# Patient Record
Sex: FEMALE | Race: WHITE | Employment: FULL TIME | URBAN - METROPOLITAN AREA
[De-identification: names, ages, dates, MRNs, and addresses within clinical notes are randomized per-mention and may not be internally consistent; named-entity substitution may affect disease eponyms.]

---

## 2019-05-24 ENCOUNTER — OFFICE VISIT (OUTPATIENT)
Dept: GYNECOLOGY | Age: 59
End: 2019-05-24
Payer: COMMERCIAL

## 2019-05-24 VITALS
HEART RATE: 97 BPM | HEIGHT: 68 IN | DIASTOLIC BLOOD PRESSURE: 88 MMHG | WEIGHT: 260.2 LBS | TEMPERATURE: 98.4 F | SYSTOLIC BLOOD PRESSURE: 158 MMHG | BODY MASS INDEX: 39.43 KG/M2

## 2019-05-24 DIAGNOSIS — Z01.419 WELL WOMAN EXAM WITH ROUTINE GYNECOLOGICAL EXAM: Primary | ICD-10-CM

## 2019-05-24 DIAGNOSIS — N93.9 ABNORMAL UTERINE BLEEDING: ICD-10-CM

## 2019-05-24 PROCEDURE — 99204 OFFICE O/P NEW MOD 45 MIN: CPT | Performed by: OBSTETRICS & GYNECOLOGY

## 2019-05-24 RX ORDER — ASPIRIN 81 MG/1
81 TABLET ORAL DAILY
COMMUNITY

## 2019-05-24 RX ORDER — EZETIMIBE 10 MG/1
10 TABLET ORAL DAILY
COMMUNITY

## 2019-05-24 RX ORDER — LOSARTAN POTASSIUM 50 MG/1
TABLET ORAL
COMMUNITY
End: 2019-07-29 | Stop reason: ALTCHOICE

## 2019-05-24 RX ORDER — ATORVASTATIN CALCIUM 10 MG/1
10 TABLET, FILM COATED ORAL NIGHTLY
COMMUNITY

## 2019-05-24 RX ORDER — LOSARTAN POTASSIUM 25 MG/1
75 TABLET ORAL DAILY
COMMUNITY

## 2019-05-24 RX ORDER — CEFDINIR 300 MG/1
CAPSULE ORAL
COMMUNITY
End: 2019-06-18 | Stop reason: ALTCHOICE

## 2019-05-24 NOTE — PROGRESS NOTES
new patient vaginal bleeding for a week. last pap 10 years ago. mammogram never had one does not want one. never had colonoscopy.

## 2019-05-24 NOTE — PROGRESS NOTES
Subjective:      Patient ID: Dorothea Crum is a 62 y.o. female. HPI  pts here for annual gyn exam.  She is having post men bleeding x 1 week. Reports its heavy. No recent pap or mammogram or colonoscopy. Sad because she feels she is now having to deal with this all alone since her  passed away. Review of Systems Pertinent review of systems items discussed above. All others systems items not discussed above were negative. Objective:   Physical Exam   Constitutional: She is oriented to person, place, and time. She appears well-developed and well-nourished. HENT:   Head: Normocephalic and atraumatic. Neck: No tracheal deviation present. No thyromegaly present. Cardiovascular: Normal rate, regular rhythm and normal heart sounds. No murmur heard. Pulmonary/Chest: Effort normal and breath sounds normal. No respiratory distress. She has no wheezes. She has no rales. Right breast exhibits no mass, no nipple discharge and no skin change. Left breast exhibits no mass, no nipple discharge and no skin change. No breast tenderness (no masses), discharge or bleeding. Abdominal: Soft. She exhibits no distension and no mass. There is no tenderness. There is no rebound. Genitourinary: Vagina normal and uterus normal. Rectal exam shows no external hemorrhoid. No breast tenderness (no masses), discharge or bleeding. There is no lesion on the right labia. There is no lesion on the left labia. Uterus is not deviated, not enlarged, not fixed and not tender. Cervix exhibits no motion tenderness, no discharge and no friability. Right adnexum displays no mass and no tenderness. Left adnexum displays no mass and no tenderness. No foreign body in the vagina. No vaginal discharge found. Genitourinary Comments: Pap not performed nor rectal bc of the heavy bleeding. Musculoskeletal: Normal range of motion. Lymphadenopathy:     She has no cervical adenopathy.    Neurological: She is alert and oriented to person, place, and time. Retained tampon removed    Procedure note  Pre op:  Post men bleeding  Post op:  Same  Procedure: endometrial biopsy  Details:  Using sterile technique, the anterior aspect of the cervix was grasped with a single tooth tenaculum. The uterus was sounded to 10 cm. The pipelle was inserted and the plunger pulled back. Adequate tissue retrieved. The process was repeated an additional time. The patient tolerated the procedure well. I will call her when the results become available to discuss the follow up plan. Assessment:      Normal gyn exam, postmen bleeding      Plan:      F/u here next week to review emb results.         Shelton Bird MD

## 2019-05-31 ENCOUNTER — OFFICE VISIT (OUTPATIENT)
Dept: GYNECOLOGY | Age: 59
End: 2019-05-31
Payer: COMMERCIAL

## 2019-05-31 VITALS
BODY MASS INDEX: 39.65 KG/M2 | SYSTOLIC BLOOD PRESSURE: 196 MMHG | DIASTOLIC BLOOD PRESSURE: 93 MMHG | HEART RATE: 101 BPM | TEMPERATURE: 97.9 F | WEIGHT: 261.6 LBS | HEIGHT: 68 IN

## 2019-05-31 DIAGNOSIS — N93.9 ABNORMAL UTERINE BLEEDING: Primary | ICD-10-CM

## 2019-05-31 PROCEDURE — 99213 OFFICE O/P EST LOW 20 MIN: CPT | Performed by: OBSTETRICS & GYNECOLOGY

## 2019-06-13 ENCOUNTER — TELEPHONE (OUTPATIENT)
Dept: GYNECOLOGY | Age: 59
End: 2019-06-13

## 2019-06-17 ENCOUNTER — TELEPHONE (OUTPATIENT)
Dept: GYNECOLOGY | Age: 59
End: 2019-06-17

## 2019-06-17 NOTE — TELEPHONE ENCOUNTER
Patient states that she has a sore throat and ear pain is on amoxicillin 500mg currently wants to know if she gets it cleared up if surgery can still happen?

## 2019-06-18 ENCOUNTER — TELEPHONE (OUTPATIENT)
Dept: FAMILY MEDICINE CLINIC | Age: 59
End: 2019-06-18

## 2019-06-18 NOTE — PROGRESS NOTES
C-Difficile admission screening and protocol:     * Admitted with diarrhea?no     *Prior history of C-Diff. In last 3 months?no     *Antibiotic use in the past 6-8 weeks? yes-UTI     *Prior hospitalization or nursing home in the last month?    no      [address] time___0730 per pt_________        Surgery time____0900________    Take the following medications with a sip of water: Follow your MD/Surgeons pre-procedure instructions regarding your medications    Do not eat or drink anything after 12:00 midnight prior to your surgery. This includes water chewing gum, mints and ice chips. You may brush your teeth and gargle the morning of your surgery, but do not swallow the water     Please see your family doctor/pediatrician for a history and physical and/or concerning medications. Bring any test results/reports from your physicians office. If you are under the care of a heart doctor or specialist doctor, please be aware that you may be asked to them for clearance    You may be asked to stop blood thinners such as Coumadin, Plavix, Fragmin, Lovenox, etc., or any anti-inflammatories such as:  Aspirin, Ibuprofen, Advil, Naproxen prior to your surgery. We also ask that you stop any OTC medications such as fish oil, vitamin E, glucosamine, garlic, Multivitamins, COQ 10, etc.    We ask that you do not smoke 24 hours prior to surgery  We ask that you do not  drink any alcoholic beverages 24 hours prior to surgery     You must make arrangements for a responsible adult to take you home after your surgery. For your safety you will not be allowed to leave alone or drive yourself home. Your surgery will be cancelled if you do not have a ride home. Also for your safety, it is strongly suggested that someone stay with you the first 24 hours after your surgery.      A parent or legal guardian must accompany a child scheduled for surgery and plan to stay at the hospital until the child is discharged. Please do not bring other children with you. For your comfort, please wear simple loose fitting clothing to the hospital.  Please do not bring valuables. Do not wear any make-up or nail polish on your fingers or toes      For your safety, please do not wear any jewelry or body piercing's on the day of surgery. All jewelry must be removed. If you have dentures, they will be removed before going to operating room. For your convenience, we will provide you with a container. If you wear contact lenses or glasses, they will be removed, please bring a case for them. If you have a living will and a durable power of  for healthcare, please bring in a copy. As part of our patient safety program to minimize surgical site infections, we ask you to do the following:    · Please notify your surgeon if you develop any illness between         now and the  day of your surgery. · This includes a cough, cold, fever, sore throat, nausea,         or vomiting, and diarrhea, etc.  ·  Please notify your surgeon if you experience dizziness, shortness         of breath or blurred vision between now and the time of your surgery. Do not shave your operative site 96 hours prior to surgery. For face and neck surgery, men may use an electric razor 48 hours   prior to surgery. You may shower the night before surgery or the morning of   your surgery with an antibacterial soap. You will need to bring a photo ID and insurance card    [facility] has an onsite pharmacy, would you like to utilize our pharmacy     If you will be staying overnight and use a C-pap machine, please bring   your C-pap to hospital     Our goal is to provide you with excellent care, therefore, visitors will be limited to two(2) in the room at a time so that we may focus on providing this care for you. Please contact pre-admission testing if you have any further questions. [facility] phone number:  [phone] PAT fax number:  [phone]  Please note these are generalized instructions for all surgical cases, you may be provided with more specific instructions according to your surgery.

## 2019-06-18 NOTE — TELEPHONE ENCOUNTER
Pt called with concerns before her  Surgery. She is taking Asprin and she also have a sore throat.  Please give pt a call [phone]

## 2019-06-18 NOTE — TELEPHONE ENCOUNTER
Spoke with patient I informed as previous note stated that with sore throat she is still ok to proceed with surgery.  She is wanting to know if she should take or stop tohe aspirin she is currently taking (81mg)

## 2019-06-19 ENCOUNTER — ANESTHESIA EVENT (OUTPATIENT)
Dept: OPERATING ROOM | Age: 59
End: 2019-06-19
Payer: COMMERCIAL

## 2019-06-20 ENCOUNTER — HOSPITAL ENCOUNTER (OUTPATIENT)
Age: 59
Setting detail: OUTPATIENT SURGERY
Discharge: HOME | End: 2019-06-20
Attending: OBSTETRICS & GYNECOLOGY | Admitting: OBSTETRICS & GYNECOLOGY
Payer: COMMERCIAL

## 2019-06-20 ENCOUNTER — ANESTHESIA (OUTPATIENT)
Dept: OPERATING ROOM | Age: 59
End: 2019-06-20
Payer: COMMERCIAL

## 2019-06-20 VITALS
SYSTOLIC BLOOD PRESSURE: 159 MMHG | DIASTOLIC BLOOD PRESSURE: 98 MMHG | RESPIRATION RATE: 16 BRPM | WEIGHT: 259.7 LBS | TEMPERATURE: 97.1 F | HEIGHT: 68 IN | HEART RATE: 95 BPM | OXYGEN SATURATION: 96 % | BODY MASS INDEX: 39.36 KG/M2

## 2019-06-20 VITALS
DIASTOLIC BLOOD PRESSURE: 64 MMHG | SYSTOLIC BLOOD PRESSURE: 147 MMHG | OXYGEN SATURATION: 100 % | RESPIRATION RATE: 12 BRPM

## 2019-06-20 DIAGNOSIS — N95.0 POSTMENOPAUSAL BLEEDING: Primary | ICD-10-CM

## 2019-06-20 PROCEDURE — 6360000002 HC RX W HCPCS: Performed by: ANESTHESIOLOGY

## 2019-06-20 PROCEDURE — 2709999900 HC NON-CHARGEABLE SUPPLY: Performed by: OBSTETRICS & GYNECOLOGY

## 2019-06-20 PROCEDURE — 7100000011 HC PHASE II RECOVERY - ADDTL 15 MIN: Performed by: OBSTETRICS & GYNECOLOGY

## 2019-06-20 PROCEDURE — 88305 TISSUE EXAM BY PATHOLOGIST: CPT

## 2019-06-20 PROCEDURE — 3700000000 HC ANESTHESIA ATTENDED CARE: Performed by: OBSTETRICS & GYNECOLOGY

## 2019-06-20 PROCEDURE — 3700000001 HC ADD 15 MINUTES (ANESTHESIA): Performed by: OBSTETRICS & GYNECOLOGY

## 2019-06-20 PROCEDURE — 58558 HYSTEROSCOPY BIOPSY: CPT | Performed by: OBSTETRICS & GYNECOLOGY

## 2019-06-20 PROCEDURE — 2580000003 HC RX 258: Performed by: NURSE ANESTHETIST, CERTIFIED REGISTERED

## 2019-06-20 PROCEDURE — 2500000003 HC RX 250 WO HCPCS: Performed by: NURSE ANESTHETIST, CERTIFIED REGISTERED

## 2019-06-20 PROCEDURE — 3600000014 HC SURGERY LEVEL 4 ADDTL 15MIN: Performed by: OBSTETRICS & GYNECOLOGY

## 2019-06-20 PROCEDURE — 2580000003 HC RX 258: Performed by: OBSTETRICS & GYNECOLOGY

## 2019-06-20 PROCEDURE — 7100000001 HC PACU RECOVERY - ADDTL 15 MIN: Performed by: OBSTETRICS & GYNECOLOGY

## 2019-06-20 PROCEDURE — 2580000003 HC RX 258: Performed by: ANESTHESIOLOGY

## 2019-06-20 PROCEDURE — 7100000000 HC PACU RECOVERY - FIRST 15 MIN: Performed by: OBSTETRICS & GYNECOLOGY

## 2019-06-20 PROCEDURE — 3600000004 HC SURGERY LEVEL 4 BASE: Performed by: OBSTETRICS & GYNECOLOGY

## 2019-06-20 PROCEDURE — 6360000002 HC RX W HCPCS: Performed by: NURSE ANESTHETIST, CERTIFIED REGISTERED

## 2019-06-20 PROCEDURE — 7100000010 HC PHASE II RECOVERY - FIRST 15 MIN: Performed by: OBSTETRICS & GYNECOLOGY

## 2019-06-20 RX ORDER — SODIUM CHLORIDE 9 MG/ML
INJECTION, SOLUTION INTRAVENOUS CONTINUOUS PRN
Status: DISCONTINUED | OUTPATIENT
Start: 2019-06-20 | End: 2019-06-20 | Stop reason: SDUPTHER

## 2019-06-20 RX ORDER — LIDOCAINE HYDROCHLORIDE 10 MG/ML
1 INJECTION, SOLUTION EPIDURAL; INFILTRATION; INTRACAUDAL; PERINEURAL
Status: DISCONTINUED | OUTPATIENT
Start: 2019-06-20 | End: 2019-06-20 | Stop reason: HOSPADM

## 2019-06-20 RX ORDER — OXYCODONE HYDROCHLORIDE AND ACETAMINOPHEN 5; 325 MG/1; MG/1
1 TABLET ORAL EVERY 6 HOURS PRN
Qty: 28 TABLET | Refills: 0 | Status: SHIPPED | OUTPATIENT
Start: 2019-06-20 | End: 2019-06-27

## 2019-06-20 RX ORDER — FENTANYL CITRATE 50 UG/ML
25 INJECTION, SOLUTION INTRAMUSCULAR; INTRAVENOUS EVERY 5 MIN PRN
Status: DISCONTINUED | OUTPATIENT
Start: 2019-06-20 | End: 2019-06-20 | Stop reason: HOSPADM

## 2019-06-20 RX ORDER — ONDANSETRON 2 MG/ML
4 INJECTION INTRAMUSCULAR; INTRAVENOUS
Status: DISCONTINUED | OUTPATIENT
Start: 2019-06-20 | End: 2019-06-20 | Stop reason: HOSPADM

## 2019-06-20 RX ORDER — OXYCODONE HYDROCHLORIDE AND ACETAMINOPHEN 5; 325 MG/1; MG/1
2 TABLET ORAL PRN
Status: DISCONTINUED | OUTPATIENT
Start: 2019-06-20 | End: 2019-06-20 | Stop reason: HOSPADM

## 2019-06-20 RX ORDER — EPHEDRINE SULFATE/0.9% NACL/PF 50 MG/5 ML
SYRINGE (ML) INTRAVENOUS PRN
Status: DISCONTINUED | OUTPATIENT
Start: 2019-06-20 | End: 2019-06-20 | Stop reason: SDUPTHER

## 2019-06-20 RX ORDER — MIDAZOLAM HYDROCHLORIDE 1 MG/ML
INJECTION INTRAMUSCULAR; INTRAVENOUS PRN
Status: DISCONTINUED | OUTPATIENT
Start: 2019-06-20 | End: 2019-06-20 | Stop reason: SDUPTHER

## 2019-06-20 RX ORDER — OXYCODONE HYDROCHLORIDE AND ACETAMINOPHEN 5; 325 MG/1; MG/1
1 TABLET ORAL PRN
Status: DISCONTINUED | OUTPATIENT
Start: 2019-06-20 | End: 2019-06-20 | Stop reason: HOSPADM

## 2019-06-20 RX ORDER — APREPITANT 40 MG/1
40 CAPSULE ORAL ONCE
Status: COMPLETED | OUTPATIENT
Start: 2019-06-20 | End: 2019-06-20

## 2019-06-20 RX ORDER — SODIUM CHLORIDE 0.9 % (FLUSH) 0.9 %
10 SYRINGE (ML) INJECTION EVERY 12 HOURS SCHEDULED
Status: DISCONTINUED | OUTPATIENT
Start: 2019-06-20 | End: 2019-06-20 | Stop reason: HOSPADM

## 2019-06-20 RX ORDER — SODIUM CHLORIDE 0.9 % (FLUSH) 0.9 %
10 SYRINGE (ML) INJECTION PRN
Status: DISCONTINUED | OUTPATIENT
Start: 2019-06-20 | End: 2019-06-20 | Stop reason: HOSPADM

## 2019-06-20 RX ORDER — LIDOCAINE HYDROCHLORIDE 20 MG/ML
INJECTION, SOLUTION EPIDURAL; INFILTRATION; INTRACAUDAL; PERINEURAL PRN
Status: DISCONTINUED | OUTPATIENT
Start: 2019-06-20 | End: 2019-06-20 | Stop reason: SDUPTHER

## 2019-06-20 RX ORDER — FENTANYL CITRATE 50 UG/ML
INJECTION, SOLUTION INTRAMUSCULAR; INTRAVENOUS PRN
Status: DISCONTINUED | OUTPATIENT
Start: 2019-06-20 | End: 2019-06-20 | Stop reason: SDUPTHER

## 2019-06-20 RX ORDER — SODIUM CHLORIDE 9 MG/ML
INJECTION, SOLUTION INTRAVENOUS CONTINUOUS
Status: DISCONTINUED | OUTPATIENT
Start: 2019-06-20 | End: 2019-06-20 | Stop reason: HOSPADM

## 2019-06-20 RX ORDER — DEXAMETHASONE SODIUM PHOSPHATE 4 MG/ML
INJECTION, SOLUTION INTRA-ARTICULAR; INTRALESIONAL; INTRAMUSCULAR; INTRAVENOUS; SOFT TISSUE PRN
Status: DISCONTINUED | OUTPATIENT
Start: 2019-06-20 | End: 2019-06-20 | Stop reason: SDUPTHER

## 2019-06-20 RX ORDER — ONDANSETRON 2 MG/ML
INJECTION INTRAMUSCULAR; INTRAVENOUS PRN
Status: DISCONTINUED | OUTPATIENT
Start: 2019-06-20 | End: 2019-06-20 | Stop reason: SDUPTHER

## 2019-06-20 RX ORDER — MAGNESIUM HYDROXIDE 1200 MG/15ML
LIQUID ORAL CONTINUOUS PRN
Status: COMPLETED | OUTPATIENT
Start: 2019-06-20 | End: 2019-06-20

## 2019-06-20 RX ORDER — PROPOFOL 10 MG/ML
INJECTION, EMULSION INTRAVENOUS PRN
Status: DISCONTINUED | OUTPATIENT
Start: 2019-06-20 | End: 2019-06-20 | Stop reason: SDUPTHER

## 2019-06-20 RX ADMIN — HYDROMORPHONE HYDROCHLORIDE 0.5 MG: 1 INJECTION, SOLUTION INTRAMUSCULAR; INTRAVENOUS; SUBCUTANEOUS at 09:29

## 2019-06-20 RX ADMIN — ONDANSETRON 4 MG: 2 INJECTION INTRAMUSCULAR; INTRAVENOUS at 08:46

## 2019-06-20 RX ADMIN — FENTANYL CITRATE 50 MCG: 50 INJECTION INTRAMUSCULAR; INTRAVENOUS at 09:02

## 2019-06-20 RX ADMIN — SODIUM CHLORIDE: 9 INJECTION, SOLUTION INTRAVENOUS at 08:09

## 2019-06-20 RX ADMIN — MIDAZOLAM 2 MG: 1 INJECTION INTRAMUSCULAR; INTRAVENOUS at 08:28

## 2019-06-20 RX ADMIN — APREPITANT 40 MG: 40 CAPSULE ORAL at 08:12

## 2019-06-20 RX ADMIN — DEXAMETHASONE SODIUM PHOSPHATE 8 MG: 4 INJECTION, SOLUTION INTRAMUSCULAR; INTRAVENOUS at 08:29

## 2019-06-20 RX ADMIN — FENTANYL CITRATE 50 MCG: 50 INJECTION INTRAMUSCULAR; INTRAVENOUS at 08:29

## 2019-06-20 RX ADMIN — PROPOFOL 200 MG: 10 INJECTION, EMULSION INTRAVENOUS at 08:31

## 2019-06-20 RX ADMIN — SODIUM CHLORIDE: 9 INJECTION, SOLUTION INTRAVENOUS at 08:20

## 2019-06-20 RX ADMIN — Medication 10 MG: at 08:56

## 2019-06-20 RX ADMIN — FAMOTIDINE 20 MG: 10 INJECTION, SOLUTION INTRAVENOUS at 08:29

## 2019-06-20 RX ADMIN — LIDOCAINE HYDROCHLORIDE 100 MG: 20 INJECTION, SOLUTION EPIDURAL; INFILTRATION; INTRACAUDAL; PERINEURAL at 08:31

## 2019-06-20 ASSESSMENT — PULMONARY FUNCTION TESTS
PIF_VALUE: 0
PIF_VALUE: 1
PIF_VALUE: 6
PIF_VALUE: 3
PIF_VALUE: 21
PIF_VALUE: 4
PIF_VALUE: 3
PIF_VALUE: 6
PIF_VALUE: 0
PIF_VALUE: 4
PIF_VALUE: 18
PIF_VALUE: 0
PIF_VALUE: 1
PIF_VALUE: 4
PIF_VALUE: 6
PIF_VALUE: 3
PIF_VALUE: 33
PIF_VALUE: 6
PIF_VALUE: 1
PIF_VALUE: 3
PIF_VALUE: 26
PIF_VALUE: 3
PIF_VALUE: 4
PIF_VALUE: 5
PIF_VALUE: 21
PIF_VALUE: 0
PIF_VALUE: 6
PIF_VALUE: 5
PIF_VALUE: 33
PIF_VALUE: 21
PIF_VALUE: 3
PIF_VALUE: 6
PIF_VALUE: 9
PIF_VALUE: 0
PIF_VALUE: 18
PIF_VALUE: 22
PIF_VALUE: 5
PIF_VALUE: 2
PIF_VALUE: 15
PIF_VALUE: 2
PIF_VALUE: 18
PIF_VALUE: 3
PIF_VALUE: 3
PIF_VALUE: 4
PIF_VALUE: 6
PIF_VALUE: 5
PIF_VALUE: 1
PIF_VALUE: 3
PIF_VALUE: 6
PIF_VALUE: 4
PIF_VALUE: 0
PIF_VALUE: 1
PIF_VALUE: 29

## 2019-06-20 ASSESSMENT — PAIN DESCRIPTION - LOCATION
LOCATION: ABDOMEN

## 2019-06-20 ASSESSMENT — PAIN DESCRIPTION - PROGRESSION
CLINICAL_PROGRESSION: NOT CHANGED
CLINICAL_PROGRESSION: GRADUALLY IMPROVING
CLINICAL_PROGRESSION: NOT CHANGED

## 2019-06-20 ASSESSMENT — PAIN - FUNCTIONAL ASSESSMENT: PAIN_FUNCTIONAL_ASSESSMENT: 0-10

## 2019-06-20 ASSESSMENT — PAIN SCALES - GENERAL
PAINLEVEL_OUTOF10: 0
PAINLEVEL_OUTOF10: 2
PAINLEVEL_OUTOF10: 7
PAINLEVEL_OUTOF10: 0

## 2019-06-20 ASSESSMENT — PAIN DESCRIPTION - ONSET
ONSET: ON-GOING
ONSET: UNABLE TO TELL
ONSET: ON-GOING

## 2019-06-20 ASSESSMENT — PAIN DESCRIPTION - PAIN TYPE
TYPE: SURGICAL PAIN

## 2019-06-20 ASSESSMENT — PAIN DESCRIPTION - FREQUENCY
FREQUENCY: CONTINUOUS

## 2019-06-20 ASSESSMENT — PAIN DESCRIPTION - ORIENTATION
ORIENTATION: LOWER

## 2019-06-20 ASSESSMENT — PAIN DESCRIPTION - DESCRIPTORS
DESCRIPTORS: CRAMPING

## 2019-06-20 NOTE — BRIEF OP NOTE
Brief Postoperative Note  ______________________________________________________________    Patient: Johanna Arroyo  YOB: 1960  MRN: [id number]  Date of Procedure: 6/20/2019    Pre-Op Diagnosis: POST MENAPAUSAL BLEEDING    Post-Op Diagnosis: Same       Procedure(s):   HYSTEROSCOPY DILATION AND CURETTAGE    Anesthesia: General    Surgeon(s):  Brittaney Flores MD    Assistant:     Estimated Blood Loss (mL): less than 50     Complications: None    Specimens:   ID Type Source Tests Collected by Time Destination   A : endometrial curettings Tissue Tissue SURGICAL PATHOLOGY Brittaney Flores MD 7/34/8915 3923        Implants:  * No implants in log *      Drains:   [REMOVED] Urethral Catheter Straight-tip;Latex 16 fr (Removed)       Findings: lush endometrial lining    sAha Ruiz MD  Date: 9/23/3482  Time: 9:16 AM

## 2019-06-20 NOTE — PROGRESS NOTES
Pt uses IS with good effort. Mod amt bloody drainage noted to jaclyn pad - pad changed. Pt theresa well. Abd soft.   To phase 2 care per stretcher with RN assist.

## 2019-06-20 NOTE — PROGRESS NOTES
Pt to pacu from OR. Pt awake. Denies pain at present. Abd rounded and soft. Soniya pad in place - small amt sero sang drainage noted. IV infusing. Monitor in sinus rhythm.

## 2019-06-20 NOTE — PROGRESS NOTES
.. Vaginal Sweep Documentation     Intraop skin prep sponge count correct, verified by cs and LD. Vaginal sweep performed by DR QUINTERO at 3599. No foreign objects or vaginal tears noted.

## 2019-06-20 NOTE — PROGRESS NOTES
Pt sitting on edge of bed, tolerating po. Discharge instructions given to patient and family. IV d/c'd.

## 2019-06-20 NOTE — ANESTHESIA PRE PROCEDURE
Department of Anesthesiology  Preprocedure Note       Name:  Valentino Conroy   Age:  62 y.o.  :  1960                                          MRN:  [id number]         Date:  2019      Surgeon: Rachel Casillas):  Darshan Gusman MD    Procedure: HYSTEROSCOPY DILATION AND CURETTAGE (N/A )    Medications prior to admission:   Prior to Admission medications    Medication Sig Start Date End Date Taking? Authorizing Provider   losartan (COZAAR) 50 MG tablet losartan 50 mg tablet daily   Yes Historical Provider, MD   losartan (COZAAR) 25 MG tablet losartan 25 mg tablet daily   Yes Historical Provider, MD   aspirin 81 MG tablet aspirin 81 mg tablet,delayed release   Yes Historical Provider, MD   ezetimibe (ZETIA) 10 MG tablet ezetimibe 10 mg tablet daily   Yes Historical Provider, MD   atorvastatin (LIPITOR) 10 MG tablet atorvastatin 10 mg tablet every evening   Yes Historical Provider, MD       Current medications:    No current facility-administered medications for this encounter. Current Outpatient Medications   Medication Sig Dispense Refill    losartan (COZAAR) 50 MG tablet losartan 50 mg tablet daily      losartan (COZAAR) 25 MG tablet losartan 25 mg tablet daily      aspirin 81 MG tablet aspirin 81 mg tablet,delayed release      ezetimibe (ZETIA) 10 MG tablet ezetimibe 10 mg tablet daily      atorvastatin (LIPITOR) 10 MG tablet atorvastatin 10 mg tablet every evening         Allergies:  No Known Allergies    Problem List:  There is no problem list on file for this patient.       Past Medical History:        Diagnosis Date    PONV (postoperative nausea and vomiting)        Past Surgical History:        Procedure Laterality Date     SECTION      CHOLECYSTECTOMY      DILATION AND CURETTAGE OF UTERUS         Social History:    Social History     Tobacco Use    Smoking status: Never Smoker    Smokeless tobacco: Never Used   Substance Use Topics    Alcohol use: Yes     Comment: socially Counseling given: Not Answered      Vital Signs (Current):   Vitals:    06/18/19 1515   Weight: 261 lb (118.4 kg)   Height: 5' 8\" (1.727 m)                                              BP Readings from Last 3 Encounters:   05/31/19 (!) 196/93   05/24/19 (!) 158/88       NPO Status:                                                                                 BMI:   Wt Readings from Last 3 Encounters:   05/31/19 261 lb 9.6 oz (118.7 kg)   05/24/19 260 lb 3.2 oz (118 kg)     Body mass index is 39.68 kg/m². CBC: No results found for: WBC, RBC, HGB, HCT, MCV, RDW, PLT    CMP: No results found for: NA, K, CL, CO2, BUN, CREATININE, GFRAA, AGRATIO, LABGLOM, GLUCOSE, PROT, CALCIUM, BILITOT, ALKPHOS, AST, ALT    POC Tests: No results for input(s): POCGLU, POCNA, POCK, POCCL, POCBUN, POCHEMO, POCHCT in the last 72 hours. Coags: No results found for: PROTIME, INR, APTT    HCG (If Applicable): No results found for: PREGTESTUR, PREGSERUM, HCG, HCGQUANT     ABGs: No results found for: PHART, PO2ART, NCC3ARQ, CYO4QJX, BEART, K0IIFBAE     Type & Screen (If Applicable):  No results found for: LABABO, LABRH    Anesthesia Evaluation     history of anesthetic complications: PONV. Airway: Mallampati: II  TM distance: >3 FB   Neck ROM: full  Mouth opening: > = 3 FB Dental:    (+) partials      Pulmonary:       (-) pneumonia, COPD and asthma                           Cardiovascular:    (+) hypertension:, hyperlipidemia    (-) valvular problems/murmurs, CAD, CABG/stent and dysrhythmias      Rhythm: regular                      Neuro/Psych:      (-) seizures, TIA and CVA           GI/Hepatic/Renal:        (-) GERD, PUD, hepatitis, liver disease and no renal disease       Endo/Other:        (-) diabetes mellitus, hypothyroidism, hyperthyroidism               Abdominal:           Vascular:     - PVD and DVT.                                   Anesthesia Plan      general     ASA 2       Induction:

## 2019-06-20 NOTE — PROGRESS NOTES
Pt arrived to phase 2 from PACU. Pt awake and alert. No vaginal bleeding noted. Rates abd pain as 2/10.   VSS

## 2019-06-20 NOTE — ANESTHESIA POSTPROCEDURE EVALUATION
Department of Anesthesiology  Postprocedure Note    Patient: Dorothea Crum  MRN: [id number]  YOB: 1960  Date of evaluation: 6/20/2019  Time:  9:28 AM     Procedure Summary     Date:  06/20/19 Room / Location:  [facility] OR 02 / [facility] OR    Anesthesia Start:  0830 Anesthesia Stop:  5937    Procedure:  HYSTEROSCOPY DILATION AND CURETTAGE (N/A Uterus) Diagnosis:  (POST MENAPAUSAL BLEEDING)    Surgeon:  Mercedes Aguilar MD Responsible Provider:  Cher Ochoa MD    Anesthesia Type:  general ASA Status:  2          Anesthesia Type: general    Jose Phase I: Jose Score: 8    Jose Phase II:      Last vitals: Reviewed and per EMR flowsheets.        Anesthesia Post Evaluation    Patient location during evaluation: PACU  Patient participation: complete - patient participated  Level of consciousness: awake  Pain score: 2  Airway patency: patent  Nausea & Vomiting: no nausea and no vomiting  Complications: no  Cardiovascular status: blood pressure returned to baseline  Respiratory status: acceptable  Hydration status: euvolemic

## 2019-06-21 NOTE — OP NOTE
first with  the endometrial polyp forceps and then with a curette. All the  endometrial curettings were collected and sent off to be evaluated by  pathologist.  The hysteroscope was then reinserted and there was a much  less lush endometrial lining seen. All the instruments then removed  from the patient's pelvic organs. She was taken out of lithotomy and allowed to awaken from anesthesia. After which time, she was transferred from the operating room to the  recovery room where she went in stable condition. All sponge, lap, and  needles were correct x2.         Nitza Washington MD    D: 15/06/9727 9:15:25       T: 06/20/2019 9:24:33     [email or web address]  Job#: [id number]     Doc#: [id number]    CC:

## 2019-06-24 ENCOUNTER — TELEPHONE (OUTPATIENT)
Dept: GYNECOLOGY | Age: 59
End: 2019-06-24

## 2019-06-28 ENCOUNTER — OFFICE VISIT (OUTPATIENT)
Dept: GYNECOLOGY | Age: 59
End: 2019-06-28
Payer: COMMERCIAL

## 2019-06-28 ENCOUNTER — TELEPHONE (OUTPATIENT)
Dept: GYNECOLOGY | Age: 59
End: 2019-06-28

## 2019-06-28 VITALS
DIASTOLIC BLOOD PRESSURE: 90 MMHG | BODY MASS INDEX: 41.14 KG/M2 | HEIGHT: 66 IN | SYSTOLIC BLOOD PRESSURE: 148 MMHG | WEIGHT: 256 LBS

## 2019-06-28 DIAGNOSIS — N85.02 COMPLEX ENDOMETRIAL HYPERPLASIA WITH ATYPIA: ICD-10-CM

## 2019-06-28 DIAGNOSIS — N93.9 ABNORMAL UTERINE BLEEDING: Primary | ICD-10-CM

## 2019-06-28 PROCEDURE — 99213 OFFICE O/P EST LOW 20 MIN: CPT | Performed by: OBSTETRICS & GYNECOLOGY

## 2019-06-28 NOTE — TELEPHONE ENCOUNTER
I spoke with the patient she had some additional questions about pre op discussed at todays appointment and concerns. I answered all additional questions she had and eased her fears. She stated that she enjoyed talking with me and dr verona santiago and that we all make her fell very comfortable. DONE.

## 2019-07-19 ENCOUNTER — HOSPITAL ENCOUNTER (OUTPATIENT)
Dept: PREADMISSION TESTING | Age: 59
Discharge: HOME | End: 2019-07-23
Payer: COMMERCIAL

## 2019-07-19 ENCOUNTER — HOSPITAL ENCOUNTER (OUTPATIENT)
Dept: GENERAL RADIOLOGY | Age: 59
Discharge: HOME | End: 2019-07-19
Payer: COMMERCIAL

## 2019-07-19 DIAGNOSIS — Z01.818 ENCOUNTER FOR PREADMISSION TESTING: ICD-10-CM

## 2019-07-19 LAB
ABO/RH: NORMAL
ANION GAP SERPL CALCULATED.3IONS-SCNC: 12 MMOL/L (ref 3–16)
ANTIBODY SCREEN: NORMAL
BASOPHILS ABSOLUTE: 0.1 K/UL (ref 0–0.2)
BASOPHILS RELATIVE PERCENT: 0.9 %
BILIRUBIN URINE: NEGATIVE
BLOOD, URINE: NEGATIVE
BUN BLDV-MCNC: 14 MG/DL (ref 7–20)
CALCIUM SERPL-MCNC: 9.6 MG/DL (ref 8.3–10.6)
CHLORIDE BLD-SCNC: 103 MMOL/L (ref 99–110)
CLARITY: CLEAR
CO2: 27 MMOL/L (ref 21–32)
COLOR: YELLOW
CREAT SERPL-MCNC: 0.8 MG/DL (ref 0.6–1.2)
EKG ATRIAL RATE: 86 BPM
EKG DIAGNOSIS: NORMAL
EKG P AXIS: 57 DEGREES
EKG P-R INTERVAL: 154 MS
EKG Q-T INTERVAL: 372 MS
EKG QRS DURATION: 80 MS
EKG QTC CALCULATION (BAZETT): 445 MS
EKG R AXIS: 38 DEGREES
EKG T AXIS: 50 DEGREES
EKG VENTRICULAR RATE: 86 BPM
EOSINOPHILS ABSOLUTE: 0.1 K/UL (ref 0–0.6)
EOSINOPHILS RELATIVE PERCENT: 1.9 %
GFR AFRICAN AMERICAN: >60
GFR NON-AFRICAN AMERICAN: >60
GLUCOSE BLD-MCNC: 109 MG/DL (ref 70–99)
GLUCOSE URINE: NEGATIVE MG/DL
HCT VFR BLD CALC: 40.3 % (ref 36–48)
HEMOGLOBIN: 13.2 G/DL (ref 12–16)
KETONES, URINE: NEGATIVE MG/DL
LEUKOCYTE ESTERASE, URINE: NEGATIVE
LYMPHOCYTES ABSOLUTE: 2.5 K/UL (ref 1–5.1)
LYMPHOCYTES RELATIVE PERCENT: 35.9 %
MCH RBC QN AUTO: 30 PG (ref 26–34)
MCHC RBC AUTO-ENTMCNC: 32.8 G/DL (ref 31–36)
MCV RBC AUTO: 91.6 FL (ref 80–100)
MICROSCOPIC EXAMINATION: NORMAL
MONOCYTES ABSOLUTE: 0.7 K/UL (ref 0–1.3)
MONOCYTES RELATIVE PERCENT: 10.2 %
NEUTROPHILS ABSOLUTE: 3.5 K/UL (ref 1.7–7.7)
NEUTROPHILS RELATIVE PERCENT: 51.1 %
NITRITE, URINE: NEGATIVE
PDW BLD-RTO: 12.9 % (ref 12.4–15.4)
PH UA: 7 (ref 5–8)
PLATELET # BLD: 302 K/UL (ref 135–450)
PMV BLD AUTO: 8 FL (ref 5–10.5)
POTASSIUM SERPL-SCNC: 4.3 MMOL/L (ref 3.5–5.1)
PROTEIN UA: NEGATIVE MG/DL
RBC # BLD: 4.4 M/UL (ref 4–5.2)
SODIUM BLD-SCNC: 142 MMOL/L (ref 136–145)
SPECIFIC GRAVITY UA: 1.03 (ref 1–1.03)
URINE REFLEX TO CULTURE: NORMAL
URINE TYPE: NORMAL
UROBILINOGEN, URINE: 1 E.U./DL
WBC # BLD: 6.9 K/UL (ref 4–11)

## 2019-07-19 PROCEDURE — 85025 COMPLETE CBC W/AUTO DIFF WBC: CPT

## 2019-07-19 PROCEDURE — 81003 URINALYSIS AUTO W/O SCOPE: CPT

## 2019-07-19 PROCEDURE — 71046 X-RAY EXAM CHEST 2 VIEWS: CPT

## 2019-07-19 PROCEDURE — 93010 ELECTROCARDIOGRAM REPORT: CPT | Performed by: INTERNAL MEDICINE

## 2019-07-19 PROCEDURE — 86900 BLOOD TYPING SEROLOGIC ABO: CPT

## 2019-07-19 PROCEDURE — 80048 BASIC METABOLIC PNL TOTAL CA: CPT

## 2019-07-19 PROCEDURE — 86850 RBC ANTIBODY SCREEN: CPT

## 2019-07-19 PROCEDURE — 86901 BLOOD TYPING SEROLOGIC RH(D): CPT

## 2019-07-19 PROCEDURE — 93005 ELECTROCARDIOGRAM TRACING: CPT | Performed by: OBSTETRICS & GYNECOLOGY

## 2019-07-25 ENCOUNTER — HOSPITAL ENCOUNTER (OUTPATIENT)
Dept: ULTRASOUND IMAGING | Age: 59
Discharge: HOME | End: 2019-07-25
Payer: COMMERCIAL

## 2019-07-25 DIAGNOSIS — N95.0 POSTMENOPAUSAL BLEEDING: ICD-10-CM

## 2019-07-25 PROCEDURE — 76856 US EXAM PELVIC COMPLETE: CPT

## 2019-07-25 PROCEDURE — 76830 TRANSVAGINAL US NON-OB: CPT

## 2019-07-29 RX ORDER — LORAZEPAM 0.5 MG/1
0.5 TABLET ORAL 2 TIMES DAILY PRN
COMMUNITY
Start: 2019-07-09

## 2019-07-30 ENCOUNTER — ANESTHESIA EVENT (OUTPATIENT)
Dept: OPERATING ROOM | Age: 59
End: 2019-07-30
Payer: COMMERCIAL

## 2019-08-01 ENCOUNTER — ANESTHESIA (OUTPATIENT)
Dept: OPERATING ROOM | Age: 59
End: 2019-08-01
Payer: COMMERCIAL

## 2019-08-01 ENCOUNTER — HOSPITAL ENCOUNTER (OUTPATIENT)
Age: 59
Discharge: NURSING, REHAB OR LONG-TERM CARE | End: 2019-08-02
Attending: OBSTETRICS & GYNECOLOGY | Admitting: OBSTETRICS & GYNECOLOGY
Payer: COMMERCIAL

## 2019-08-01 VITALS
OXYGEN SATURATION: 94 % | SYSTOLIC BLOOD PRESSURE: 133 MMHG | TEMPERATURE: 97.2 F | RESPIRATION RATE: 11 BRPM | DIASTOLIC BLOOD PRESSURE: 77 MMHG

## 2019-08-01 DIAGNOSIS — Z01.818 ENCOUNTER FOR PREADMISSION TESTING: Primary | ICD-10-CM

## 2019-08-01 DIAGNOSIS — N85.00 ENDOMETRIAL HYPERPLASIA: ICD-10-CM

## 2019-08-01 PROBLEM — C54.1 ENDOMETRIAL CANCER (HCC): Status: ACTIVE | Noted: 2019-08-01

## 2019-08-01 LAB
ABO/RH: NORMAL
ANTIBODY SCREEN: NORMAL

## 2019-08-01 PROCEDURE — 86901 BLOOD TYPING SEROLOGIC RH(D): CPT

## 2019-08-01 PROCEDURE — S2900 ROBOTIC SURGICAL SYSTEM: HCPCS | Performed by: OBSTETRICS & GYNECOLOGY

## 2019-08-01 PROCEDURE — 88305 TISSUE EXAM BY PATHOLOGIST: CPT

## 2019-08-01 PROCEDURE — 2709999900 HC NON-CHARGEABLE SUPPLY: Performed by: OBSTETRICS & GYNECOLOGY

## 2019-08-01 PROCEDURE — 2580000003 HC RX 258: Performed by: ANESTHESIOLOGY

## 2019-08-01 PROCEDURE — 88307 TISSUE EXAM BY PATHOLOGIST: CPT

## 2019-08-01 PROCEDURE — 7100000001 HC PACU RECOVERY - ADDTL 15 MIN: Performed by: OBSTETRICS & GYNECOLOGY

## 2019-08-01 PROCEDURE — 86900 BLOOD TYPING SEROLOGIC ABO: CPT

## 2019-08-01 PROCEDURE — 2700000000 HC OXYGEN THERAPY PER DAY

## 2019-08-01 PROCEDURE — 7100000000 HC PACU RECOVERY - FIRST 15 MIN: Performed by: OBSTETRICS & GYNECOLOGY

## 2019-08-01 PROCEDURE — 3600000009 HC SURGERY ROBOT BASE: Performed by: OBSTETRICS & GYNECOLOGY

## 2019-08-01 PROCEDURE — 88360 TUMOR IMMUNOHISTOCHEM/MANUAL: CPT

## 2019-08-01 PROCEDURE — 2580000003 HC RX 258: Performed by: OBSTETRICS & GYNECOLOGY

## 2019-08-01 PROCEDURE — 6360000002 HC RX W HCPCS

## 2019-08-01 PROCEDURE — 2500000003 HC RX 250 WO HCPCS: Performed by: OBSTETRICS & GYNECOLOGY

## 2019-08-01 PROCEDURE — 3600000019 HC SURGERY ROBOT ADDTL 15MIN: Performed by: OBSTETRICS & GYNECOLOGY

## 2019-08-01 PROCEDURE — 2500000003 HC RX 250 WO HCPCS

## 2019-08-01 PROCEDURE — 6370000000 HC RX 637 (ALT 250 FOR IP): Performed by: OBSTETRICS & GYNECOLOGY

## 2019-08-01 PROCEDURE — 2720000010 HC SURG SUPPLY STERILE: Performed by: OBSTETRICS & GYNECOLOGY

## 2019-08-01 PROCEDURE — 88342 IMHCHEM/IMCYTCHM 1ST ANTB: CPT

## 2019-08-01 PROCEDURE — 88112 CYTOPATH CELL ENHANCE TECH: CPT

## 2019-08-01 PROCEDURE — 3700000001 HC ADD 15 MINUTES (ANESTHESIA): Performed by: OBSTETRICS & GYNECOLOGY

## 2019-08-01 PROCEDURE — 88309 TISSUE EXAM BY PATHOLOGIST: CPT

## 2019-08-01 PROCEDURE — 6360000002 HC RX W HCPCS: Performed by: OBSTETRICS & GYNECOLOGY

## 2019-08-01 PROCEDURE — 94760 N-INVAS EAR/PLS OXIMETRY 1: CPT

## 2019-08-01 PROCEDURE — 3700000000 HC ANESTHESIA ATTENDED CARE: Performed by: OBSTETRICS & GYNECOLOGY

## 2019-08-01 PROCEDURE — 86850 RBC ANTIBODY SCREEN: CPT

## 2019-08-01 RX ORDER — BUPIVACAINE HYDROCHLORIDE 5 MG/ML
INJECTION, SOLUTION EPIDURAL; INTRACAUDAL
Status: COMPLETED | OUTPATIENT
Start: 2019-08-01 | End: 2019-08-01

## 2019-08-01 RX ORDER — ATORVASTATIN CALCIUM 10 MG/1
10 TABLET, FILM COATED ORAL NIGHTLY
Status: DISCONTINUED | OUTPATIENT
Start: 2019-08-01 | End: 2019-08-02 | Stop reason: HOSPADM

## 2019-08-01 RX ORDER — MAGNESIUM HYDROXIDE 1200 MG/15ML
LIQUID ORAL CONTINUOUS PRN
Status: COMPLETED | OUTPATIENT
Start: 2019-08-01 | End: 2019-08-01

## 2019-08-01 RX ORDER — MORPHINE SULFATE 2 MG/ML
2 INJECTION, SOLUTION INTRAMUSCULAR; INTRAVENOUS EVERY 5 MIN PRN
Status: DISCONTINUED | OUTPATIENT
Start: 2019-08-01 | End: 2019-08-01 | Stop reason: HOSPADM

## 2019-08-01 RX ORDER — LABETALOL HYDROCHLORIDE 5 MG/ML
5 INJECTION, SOLUTION INTRAVENOUS EVERY 10 MIN PRN
Status: DISCONTINUED | OUTPATIENT
Start: 2019-08-01 | End: 2019-08-01 | Stop reason: HOSPADM

## 2019-08-01 RX ORDER — INDOCYANINE GREEN AND WATER 25 MG
KIT INJECTION
Status: COMPLETED | OUTPATIENT
Start: 2019-08-01 | End: 2019-08-01

## 2019-08-01 RX ORDER — SODIUM CHLORIDE 0.9 % (FLUSH) 0.9 %
10 SYRINGE (ML) INJECTION EVERY 12 HOURS SCHEDULED
Status: DISCONTINUED | OUTPATIENT
Start: 2019-08-01 | End: 2019-08-02 | Stop reason: HOSPADM

## 2019-08-01 RX ORDER — SODIUM CHLORIDE 0.9 % (FLUSH) 0.9 %
10 SYRINGE (ML) INJECTION EVERY 12 HOURS SCHEDULED
Status: DISCONTINUED | OUTPATIENT
Start: 2019-08-01 | End: 2019-08-01 | Stop reason: HOSPADM

## 2019-08-01 RX ORDER — SODIUM CHLORIDE 0.9 % (FLUSH) 0.9 %
10 SYRINGE (ML) INJECTION PRN
Status: DISCONTINUED | OUTPATIENT
Start: 2019-08-01 | End: 2019-08-01 | Stop reason: HOSPADM

## 2019-08-01 RX ORDER — ROCURONIUM BROMIDE 10 MG/ML
INJECTION, SOLUTION INTRAVENOUS PRN
Status: DISCONTINUED | OUTPATIENT
Start: 2019-08-01 | End: 2019-08-01 | Stop reason: SDUPTHER

## 2019-08-01 RX ORDER — DIPHENHYDRAMINE HYDROCHLORIDE 50 MG/ML
INJECTION INTRAMUSCULAR; INTRAVENOUS PRN
Status: DISCONTINUED | OUTPATIENT
Start: 2019-08-01 | End: 2019-08-01 | Stop reason: SDUPTHER

## 2019-08-01 RX ORDER — HYDRALAZINE HYDROCHLORIDE 20 MG/ML
5 INJECTION INTRAMUSCULAR; INTRAVENOUS
Status: DISCONTINUED | OUTPATIENT
Start: 2019-08-01 | End: 2019-08-01 | Stop reason: HOSPADM

## 2019-08-01 RX ORDER — LIDOCAINE HYDROCHLORIDE 20 MG/ML
INJECTION, SOLUTION EPIDURAL; INFILTRATION; INTRACAUDAL; PERINEURAL PRN
Status: DISCONTINUED | OUTPATIENT
Start: 2019-08-01 | End: 2019-08-01 | Stop reason: SDUPTHER

## 2019-08-01 RX ORDER — DEXTROSE AND SODIUM CHLORIDE 5; .45 G/100ML; G/100ML
INJECTION, SOLUTION INTRAVENOUS CONTINUOUS
Status: DISCONTINUED | OUTPATIENT
Start: 2019-08-01 | End: 2019-08-02

## 2019-08-01 RX ORDER — ONDANSETRON 2 MG/ML
4 INJECTION INTRAMUSCULAR; INTRAVENOUS EVERY 8 HOURS PRN
Status: DISCONTINUED | OUTPATIENT
Start: 2019-08-01 | End: 2019-08-02 | Stop reason: HOSPADM

## 2019-08-01 RX ORDER — ACETAMINOPHEN 500 MG
500 TABLET ORAL EVERY 6 HOURS
Status: DISCONTINUED | OUTPATIENT
Start: 2019-08-01 | End: 2019-08-02 | Stop reason: HOSPADM

## 2019-08-01 RX ORDER — MORPHINE SULFATE 2 MG/ML
1 INJECTION, SOLUTION INTRAMUSCULAR; INTRAVENOUS EVERY 5 MIN PRN
Status: DISCONTINUED | OUTPATIENT
Start: 2019-08-01 | End: 2019-08-01 | Stop reason: HOSPADM

## 2019-08-01 RX ORDER — DEXAMETHASONE SODIUM PHOSPHATE 4 MG/ML
INJECTION, SOLUTION INTRA-ARTICULAR; INTRALESIONAL; INTRAMUSCULAR; INTRAVENOUS; SOFT TISSUE PRN
Status: DISCONTINUED | OUTPATIENT
Start: 2019-08-01 | End: 2019-08-01 | Stop reason: SDUPTHER

## 2019-08-01 RX ORDER — PROPOFOL 10 MG/ML
INJECTION, EMULSION INTRAVENOUS PRN
Status: DISCONTINUED | OUTPATIENT
Start: 2019-08-01 | End: 2019-08-01 | Stop reason: SDUPTHER

## 2019-08-01 RX ORDER — KETOROLAC TROMETHAMINE 15 MG/ML
15 INJECTION, SOLUTION INTRAMUSCULAR; INTRAVENOUS EVERY 6 HOURS
Status: DISCONTINUED | OUTPATIENT
Start: 2019-08-01 | End: 2019-08-02 | Stop reason: HOSPADM

## 2019-08-01 RX ORDER — MIDAZOLAM HYDROCHLORIDE 1 MG/ML
INJECTION INTRAMUSCULAR; INTRAVENOUS PRN
Status: DISCONTINUED | OUTPATIENT
Start: 2019-08-01 | End: 2019-08-01 | Stop reason: SDUPTHER

## 2019-08-01 RX ORDER — MEPERIDINE HYDROCHLORIDE 25 MG/ML
12.5 INJECTION INTRAMUSCULAR; INTRAVENOUS; SUBCUTANEOUS EVERY 5 MIN PRN
Status: DISCONTINUED | OUTPATIENT
Start: 2019-08-01 | End: 2019-08-01 | Stop reason: HOSPADM

## 2019-08-01 RX ORDER — OXYCODONE HYDROCHLORIDE 10 MG/1
10 TABLET ORAL EVERY 4 HOURS PRN
Status: DISCONTINUED | OUTPATIENT
Start: 2019-08-01 | End: 2019-08-02 | Stop reason: HOSPADM

## 2019-08-01 RX ORDER — SODIUM CHLORIDE 0.9 % (FLUSH) 0.9 %
10 SYRINGE (ML) INJECTION PRN
Status: DISCONTINUED | OUTPATIENT
Start: 2019-08-01 | End: 2019-08-02 | Stop reason: HOSPADM

## 2019-08-01 RX ORDER — SODIUM CHLORIDE 9 MG/ML
INJECTION, SOLUTION INTRAVENOUS CONTINUOUS
Status: DISCONTINUED | OUTPATIENT
Start: 2019-08-01 | End: 2019-08-01

## 2019-08-01 RX ORDER — OXYCODONE HYDROCHLORIDE AND ACETAMINOPHEN 5; 325 MG/1; MG/1
2 TABLET ORAL PRN
Status: DISCONTINUED | OUTPATIENT
Start: 2019-08-01 | End: 2019-08-01 | Stop reason: HOSPADM

## 2019-08-01 RX ORDER — ONDANSETRON 2 MG/ML
4 INJECTION INTRAMUSCULAR; INTRAVENOUS
Status: DISCONTINUED | OUTPATIENT
Start: 2019-08-01 | End: 2019-08-01 | Stop reason: HOSPADM

## 2019-08-01 RX ORDER — KETOROLAC TROMETHAMINE 30 MG/ML
INJECTION, SOLUTION INTRAMUSCULAR; INTRAVENOUS PRN
Status: DISCONTINUED | OUTPATIENT
Start: 2019-08-01 | End: 2019-08-01 | Stop reason: SDUPTHER

## 2019-08-01 RX ORDER — SUCCINYLCHOLINE/SOD CL,ISO/PF 200MG/10ML
SYRINGE (ML) INTRAVENOUS PRN
Status: DISCONTINUED | OUTPATIENT
Start: 2019-08-01 | End: 2019-08-01 | Stop reason: SDUPTHER

## 2019-08-01 RX ORDER — FENTANYL CITRATE 50 UG/ML
INJECTION, SOLUTION INTRAMUSCULAR; INTRAVENOUS PRN
Status: DISCONTINUED | OUTPATIENT
Start: 2019-08-01 | End: 2019-08-01 | Stop reason: SDUPTHER

## 2019-08-01 RX ORDER — OXYCODONE HYDROCHLORIDE 5 MG/1
5 TABLET ORAL EVERY 4 HOURS PRN
Status: DISCONTINUED | OUTPATIENT
Start: 2019-08-01 | End: 2019-08-02 | Stop reason: HOSPADM

## 2019-08-01 RX ORDER — LORAZEPAM 0.5 MG/1
0.5 TABLET ORAL 2 TIMES DAILY PRN
Status: DISCONTINUED | OUTPATIENT
Start: 2019-08-01 | End: 2019-08-02 | Stop reason: HOSPADM

## 2019-08-01 RX ORDER — GLYCOPYRROLATE 0.2 MG/ML
INJECTION INTRAMUSCULAR; INTRAVENOUS PRN
Status: DISCONTINUED | OUTPATIENT
Start: 2019-08-01 | End: 2019-08-01 | Stop reason: SDUPTHER

## 2019-08-01 RX ORDER — ONDANSETRON 2 MG/ML
INJECTION INTRAMUSCULAR; INTRAVENOUS PRN
Status: DISCONTINUED | OUTPATIENT
Start: 2019-08-01 | End: 2019-08-01 | Stop reason: SDUPTHER

## 2019-08-01 RX ORDER — OXYCODONE HYDROCHLORIDE AND ACETAMINOPHEN 5; 325 MG/1; MG/1
1 TABLET ORAL PRN
Status: DISCONTINUED | OUTPATIENT
Start: 2019-08-01 | End: 2019-08-01 | Stop reason: HOSPADM

## 2019-08-01 RX ADMIN — ROCURONIUM BROMIDE 20 MG: 10 INJECTION INTRAVENOUS at 08:10

## 2019-08-01 RX ADMIN — KETOROLAC TROMETHAMINE 15 MG: 15 INJECTION, SOLUTION INTRAMUSCULAR; INTRAVENOUS at 20:37

## 2019-08-01 RX ADMIN — DEXTROSE AND SODIUM CHLORIDE: 5; 450 INJECTION, SOLUTION INTRAVENOUS at 19:52

## 2019-08-01 RX ADMIN — CEFOXITIN SODIUM 2 G: 2 POWDER, FOR SOLUTION INTRAVENOUS at 07:41

## 2019-08-01 RX ADMIN — HYDROMORPHONE HYDROCHLORIDE 0.5 MG: 1 INJECTION, SOLUTION INTRAMUSCULAR; INTRAVENOUS; SUBCUTANEOUS at 08:34

## 2019-08-01 RX ADMIN — ONDANSETRON 4 MG: 2 INJECTION INTRAMUSCULAR; INTRAVENOUS at 09:54

## 2019-08-01 RX ADMIN — ATORVASTATIN CALCIUM 10 MG: 10 TABLET, FILM COATED ORAL at 19:52

## 2019-08-01 RX ADMIN — FENTANYL CITRATE 100 MCG: 50 INJECTION INTRAMUSCULAR; INTRAVENOUS at 07:46

## 2019-08-01 RX ADMIN — ONDANSETRON 4 MG: 2 INJECTION INTRAMUSCULAR; INTRAVENOUS at 21:30

## 2019-08-01 RX ADMIN — GLYCOPYRROLATE 0.2 MG: 0.2 INJECTION, SOLUTION INTRAMUSCULAR; INTRAVENOUS at 08:03

## 2019-08-01 RX ADMIN — DEXAMETHASONE SODIUM PHOSPHATE 8 MG: 4 INJECTION, SOLUTION INTRAMUSCULAR; INTRAVENOUS at 08:00

## 2019-08-01 RX ADMIN — ROCURONIUM BROMIDE 50 MG: 10 INJECTION INTRAVENOUS at 07:51

## 2019-08-01 RX ADMIN — DEXTROSE AND SODIUM CHLORIDE: 5; 450 INJECTION, SOLUTION INTRAVENOUS at 13:36

## 2019-08-01 RX ADMIN — OXYCODONE HYDROCHLORIDE 5 MG: 5 TABLET ORAL at 19:52

## 2019-08-01 RX ADMIN — SODIUM CHLORIDE: 9 INJECTION, SOLUTION INTRAVENOUS at 09:55

## 2019-08-01 RX ADMIN — KETOROLAC TROMETHAMINE 30 MG: 30 INJECTION, SOLUTION INTRAMUSCULAR at 09:47

## 2019-08-01 RX ADMIN — SUGAMMADEX 200 MG: 100 INJECTION, SOLUTION INTRAVENOUS at 09:59

## 2019-08-01 RX ADMIN — MIDAZOLAM 2 MG: 1 INJECTION INTRAMUSCULAR; INTRAVENOUS at 07:41

## 2019-08-01 RX ADMIN — LOSARTAN POTASSIUM 75 MG: 25 TABLET ORAL at 13:35

## 2019-08-01 RX ADMIN — SODIUM CHLORIDE: 9 INJECTION, SOLUTION INTRAVENOUS at 06:40

## 2019-08-01 RX ADMIN — DIPHENHYDRAMINE HYDROCHLORIDE 12.5 MG: 50 INJECTION, SOLUTION INTRAMUSCULAR; INTRAVENOUS at 08:16

## 2019-08-01 RX ADMIN — LIDOCAINE HYDROCHLORIDE 100 MG: 20 INJECTION, SOLUTION EPIDURAL; INFILTRATION; INTRACAUDAL; PERINEURAL at 07:47

## 2019-08-01 RX ADMIN — KETOROLAC TROMETHAMINE 15 MG: 15 INJECTION, SOLUTION INTRAMUSCULAR; INTRAVENOUS at 15:05

## 2019-08-01 RX ADMIN — Medication 120 MG: at 07:49

## 2019-08-01 RX ADMIN — PROPOFOL 150 MG: 10 INJECTION, EMULSION INTRAVENOUS at 07:48

## 2019-08-01 RX ADMIN — ACETAMINOPHEN 500 MG: 500 TABLET ORAL at 13:35

## 2019-08-01 ASSESSMENT — PULMONARY FUNCTION TESTS
PIF_VALUE: 39
PIF_VALUE: 38
PIF_VALUE: 39
PIF_VALUE: 38
PIF_VALUE: 22
PIF_VALUE: 31
PIF_VALUE: 38
PIF_VALUE: 39
PIF_VALUE: 26
PIF_VALUE: 5
PIF_VALUE: 39
PIF_VALUE: 3
PIF_VALUE: 39
PIF_VALUE: 19
PIF_VALUE: 27
PIF_VALUE: 30
PIF_VALUE: 21
PIF_VALUE: 38
PIF_VALUE: 4
PIF_VALUE: 37
PIF_VALUE: 29
PIF_VALUE: 38
PIF_VALUE: 31
PIF_VALUE: 38
PIF_VALUE: 39
PIF_VALUE: 38
PIF_VALUE: 20
PIF_VALUE: 39
PIF_VALUE: 38
PIF_VALUE: 23
PIF_VALUE: 39
PIF_VALUE: 38
PIF_VALUE: 25
PIF_VALUE: 6
PIF_VALUE: 26
PIF_VALUE: 39
PIF_VALUE: 31
PIF_VALUE: 0
PIF_VALUE: 38
PIF_VALUE: 2
PIF_VALUE: 4
PIF_VALUE: 40
PIF_VALUE: 38
PIF_VALUE: 22
PIF_VALUE: 39
PIF_VALUE: 1
PIF_VALUE: 31
PIF_VALUE: 21
PIF_VALUE: 19
PIF_VALUE: 38
PIF_VALUE: 19
PIF_VALUE: 39
PIF_VALUE: 35
PIF_VALUE: 16
PIF_VALUE: 38
PIF_VALUE: 39
PIF_VALUE: 38
PIF_VALUE: 39
PIF_VALUE: 4
PIF_VALUE: 38
PIF_VALUE: 5
PIF_VALUE: 38
PIF_VALUE: 38
PIF_VALUE: 37
PIF_VALUE: 38
PIF_VALUE: 19
PIF_VALUE: 37
PIF_VALUE: 39
PIF_VALUE: 38
PIF_VALUE: 39
PIF_VALUE: 5
PIF_VALUE: 27
PIF_VALUE: 12
PIF_VALUE: 19
PIF_VALUE: 38
PIF_VALUE: 21
PIF_VALUE: 23
PIF_VALUE: 32
PIF_VALUE: 38
PIF_VALUE: 1
PIF_VALUE: 38
PIF_VALUE: 40
PIF_VALUE: 3
PIF_VALUE: 38
PIF_VALUE: 0
PIF_VALUE: 26
PIF_VALUE: 38
PIF_VALUE: 40
PIF_VALUE: 31
PIF_VALUE: 39
PIF_VALUE: 22
PIF_VALUE: 38
PIF_VALUE: 39
PIF_VALUE: 38
PIF_VALUE: 38
PIF_VALUE: 31
PIF_VALUE: 38
PIF_VALUE: 38
PIF_VALUE: 27
PIF_VALUE: 22
PIF_VALUE: 38
PIF_VALUE: 37
PIF_VALUE: 37
PIF_VALUE: 31
PIF_VALUE: 19
PIF_VALUE: 38
PIF_VALUE: 39
PIF_VALUE: 39
PIF_VALUE: 38
PIF_VALUE: 21
PIF_VALUE: 22
PIF_VALUE: 20
PIF_VALUE: 0
PIF_VALUE: 38
PIF_VALUE: 2
PIF_VALUE: 35
PIF_VALUE: 38
PIF_VALUE: 39
PIF_VALUE: 38
PIF_VALUE: 38
PIF_VALUE: 21
PIF_VALUE: 38
PIF_VALUE: 19
PIF_VALUE: 39
PIF_VALUE: 39
PIF_VALUE: 38

## 2019-08-01 ASSESSMENT — PAIN DESCRIPTION - ONSET
ONSET: ON-GOING

## 2019-08-01 ASSESSMENT — PAIN - FUNCTIONAL ASSESSMENT
PAIN_FUNCTIONAL_ASSESSMENT: ACTIVITIES ARE NOT PREVENTED
PAIN_FUNCTIONAL_ASSESSMENT: 0-10
PAIN_FUNCTIONAL_ASSESSMENT: ACTIVITIES ARE NOT PREVENTED

## 2019-08-01 ASSESSMENT — PAIN DESCRIPTION - FREQUENCY
FREQUENCY: CONTINUOUS

## 2019-08-01 ASSESSMENT — PAIN DESCRIPTION - PAIN TYPE
TYPE: SURGICAL PAIN
TYPE: ACUTE PAIN
TYPE: SURGICAL PAIN
TYPE: ACUTE PAIN
TYPE: SURGICAL PAIN
TYPE: SURGICAL PAIN

## 2019-08-01 ASSESSMENT — PAIN DESCRIPTION - DESCRIPTORS
DESCRIPTORS: CRAMPING
DESCRIPTORS: ACHING
DESCRIPTORS: CRAMPING
DESCRIPTORS: ACHING
DESCRIPTORS: CRAMPING
DESCRIPTORS: CRAMPING

## 2019-08-01 ASSESSMENT — ENCOUNTER SYMPTOMS: SHORTNESS OF BREATH: 0

## 2019-08-01 ASSESSMENT — PAIN SCALES - GENERAL
PAINLEVEL_OUTOF10: 3
PAINLEVEL_OUTOF10: 3
PAINLEVEL_OUTOF10: 4
PAINLEVEL_OUTOF10: 0
PAINLEVEL_OUTOF10: 1
PAINLEVEL_OUTOF10: 0
PAINLEVEL_OUTOF10: 0
PAINLEVEL_OUTOF10: 3
PAINLEVEL_OUTOF10: 0
PAINLEVEL_OUTOF10: 0
PAINLEVEL_OUTOF10: 4
PAINLEVEL_OUTOF10: 0

## 2019-08-01 ASSESSMENT — PAIN DESCRIPTION - LOCATION
LOCATION: ABDOMEN
LOCATION: BACK
LOCATION: ABDOMEN
LOCATION: BACK

## 2019-08-01 ASSESSMENT — PAIN DESCRIPTION - PROGRESSION
CLINICAL_PROGRESSION: NOT CHANGED
CLINICAL_PROGRESSION: GRADUALLY WORSENING
CLINICAL_PROGRESSION: NOT CHANGED
CLINICAL_PROGRESSION: NOT CHANGED
CLINICAL_PROGRESSION: GRADUALLY WORSENING
CLINICAL_PROGRESSION: GRADUALLY WORSENING

## 2019-08-01 NOTE — PROGRESS NOTES
4 Eyes Skin Assessment     The patient is being assess for  Admission    I agree that 2 RN's have performed a thorough Head to Toe Skin Assessment on the patient. ALL assessment sites listed below have been assessed. Areas assessed by both nurses: Yes  [x]   Head, Face, and Ears   [x]   Shoulders, Back, and Chest  [x]   Arms, Elbows, and Hands   [x]   Coccyx, Sacrum, and IschIum  [x]   Legs, Feet, and Heels        Does the Patient have Skin Breakdown?   No   Calloused feet bilateral        Jeison Prevention initiated:  No   Wound Care Orders initiated:  No      [facility] nurse consulted for Pressure Injury (Stage 3,4, Unstageable, DTI, NWPT, and Complex wounds), New and Established Ostomies:  No      Nurse 1 eSignature: Electronically signed by Mallorie Still RN on 8/1/19 at 1:51 PM    **SHARE this note so that the co-signing nurse is able to place an eSignature**    Nurse 2 eSignature: Electronically signed by Viet Sheppard RN on 8/1/19 at 1:52 PM

## 2019-08-01 NOTE — ANESTHESIA PRE PROCEDURE
Continuous Polo Villasenor MD 75 mL/hr at 19 0640      sodium chloride flush 0.9 % injection 10 mL  10 mL Intravenous 2 times per day Polo Villasenor MD        sodium chloride flush 0.9 % injection 10 mL  10 mL Intravenous PRN Polo Villasenor MD         Vital Signs (Current)   Vitals:    19 1632 19 0615 19   BP:  (!) 161/84    Pulse:  79    Resp:  16    Temp:   97.3 °F (36.3 °C)   TempSrc:  Temporal    SpO2:  97%    Weight: 256 lb (116.1 kg)  256 lb (116.1 kg)   Height: 5' 7\" (1.702 m)  5' 7\" (1.702 m)                                          BP Readings from Last 3 Encounters:   19 (!) 161/84   19 (!) 148/90   19 (!) 147/64     Vital Signs Statistics (for past 48 hrs)     Temp  Av.3 °F (36.3 °C)  Min: 97.3 °F (36.3 °C)   Min taken time: 19  Max: 97.3 °F (36.3 °C)   Max taken time: 19  Pulse  Av  Min: 78   Min taken time: 19  Max: 78   Max taken time: 19  Resp  Av  Min: 12   Min taken time: 19  Max: 12   Max taken time: 19  BP  Min: 161/84   Min taken time: 1915  Max: 161/84   Max taken time: 19  SpO2  Av %  Min: 97 %   Min taken time: 19  Max: 97 %   Max taken time: 19  BP Readings from Last 3 Encounters:   19 (!) 161/84   19 (!) 148/90   19 (!) 147/64       BMI  Body mass index is 40.1 kg/m². Estimated body mass index is 40.1 kg/m² as calculated from the following:    Height as of this encounter: 5' 7\" (1.702 m). Weight as of this encounter: 256 lb (116.1 kg).     CBC   Lab Results   Component Value Date    WBC 6.9 2019    RBC 4.40 2019    HGB 13.2 2019    HCT 40.3 2019    MCV 91.6 2019    RDW 12.9 2019     2019     CMP    Lab Results   Component Value Date     2019    K 4.3 2019     2019    CO2 27 2019    BUN 14 2019

## 2019-08-01 NOTE — PROGRESS NOTES
Patient resting in bed with eyes closed at this time. Patient does not appear to be in distress, and does not present with any physical or emotional needs at this time. Respirations easy and unlabored. Fall precautions remain in place; bed alarm on. IV clean, dry, intact, and infusing. Will continue to monitor.

## 2019-08-01 NOTE — ANESTHESIA POSTPROCEDURE EVALUATION
Department of Anesthesiology  Postprocedure Note    Patient: Latrell Ward  MRN: [id number]  YOB: 1960  Date of evaluation: 8/1/2019  Time:  1:10 PM     Procedure Summary     Date:  08/01/19 Room / Location:  [facility] OR [address] [place] [address] OR    Anesthesia Start:  0741 Anesthesia Stop:  1018    Procedure:  ROBOTIC HYSTERECTOMY, BILATERAL SALPINGO OOPHORECTOMY, SENTINEL LYMPH NODE BIOPSY, LYMPH NODE DISSECTION (Bilateral ) Diagnosis:       Endometrial hyperplasia      (Endometrial hyperplasia)    Surgeon:  Joan Carter MD Responsible Provider:  Jagdeep Daniel MD    Anesthesia Type:  general ASA Status:  3          Anesthesia Type: general    Jose Phase I: Jose Score: 8    Jose Phase II:      Last vitals: Reviewed and per EMR flowsheets.        Anesthesia Post Evaluation    Patient location during evaluation: PACU  Level of consciousness: awake and alert  Airway patency: patent  Nausea & Vomiting: no nausea and no vomiting  Complications: no  Cardiovascular status: blood pressure returned to baseline  Respiratory status: acceptable  Hydration status: euvolemic  Comments: Postoperative Anesthesia Note    Name:    Latrell Ward  MRN:      [id number]    Patient Vitals in the past 12 hrs:  08/01/19 1249, BP:(!) 174/92, Temp:97.5 °F (36.4 °C), Temp src:Oral, Pulse:86, Resp:16, SpO2:92 %  08/01/19 1231, Pulse:78, Resp:17, SpO2:95 %  08/01/19 1224, Pulse:75, Resp:16, SpO2:95 %  08/01/19 1218, BP:(!) 165/88, Pulse:78, Resp:18, SpO2:96 %  08/01/19 1211, Pulse:78, Resp:18, SpO2:96 %  08/01/19 1146, BP:(!) 170/89, Pulse:75, Resp:17, SpO2:97 %  08/01/19 1144, Pulse:74, Resp:16, SpO2:96 %  08/01/19 1132, BP:(!) 165/91, Pulse:72, Resp:15, SpO2:98 %  08/01/19 1116, BP:(!) 160/99, Pulse:76, Resp:15, SpO2:97 %  08/01/19 1106, Pulse:74, Resp:16, SpO2:97 %  08/01/19 1102, BP:(!) 158/94, Pulse:72, Resp:25, SpO2:99 %  08/01/19 1052, Pulse:69, Resp:15, SpO2:95 %  08/01/19 1045, BP:(!) 156/104, Pulse:81, Resp:16,

## 2019-08-02 VITALS
BODY MASS INDEX: 40.6 KG/M2 | SYSTOLIC BLOOD PRESSURE: 127 MMHG | TEMPERATURE: 98.5 F | DIASTOLIC BLOOD PRESSURE: 73 MMHG | WEIGHT: 258.7 LBS | OXYGEN SATURATION: 95 % | RESPIRATION RATE: 16 BRPM | HEART RATE: 73 BPM | HEIGHT: 67 IN

## 2019-08-02 PROBLEM — N85.02 COMPLEX ATYPICAL ENDOMETRIAL HYPERPLASIA: Status: ACTIVE | Noted: 2019-08-02

## 2019-08-02 LAB
ANION GAP SERPL CALCULATED.3IONS-SCNC: 11 MMOL/L (ref 3–16)
BASOPHILS ABSOLUTE: 0 K/UL (ref 0–0.2)
BASOPHILS RELATIVE PERCENT: 0.1 %
BUN BLDV-MCNC: 9 MG/DL (ref 7–20)
CALCIUM SERPL-MCNC: 8.7 MG/DL (ref 8.3–10.6)
CHLORIDE BLD-SCNC: 105 MMOL/L (ref 99–110)
CO2: 23 MMOL/L (ref 21–32)
CREAT SERPL-MCNC: 0.8 MG/DL (ref 0.6–1.2)
EOSINOPHILS ABSOLUTE: 0 K/UL (ref 0–0.6)
EOSINOPHILS RELATIVE PERCENT: 0 %
GFR AFRICAN AMERICAN: >60
GFR NON-AFRICAN AMERICAN: >60
GLUCOSE BLD-MCNC: 147 MG/DL (ref 70–99)
HCT VFR BLD CALC: 36.9 % (ref 36–48)
HEMOGLOBIN: 12.3 G/DL (ref 12–16)
LYMPHOCYTES ABSOLUTE: 1.4 K/UL (ref 1–5.1)
LYMPHOCYTES RELATIVE PERCENT: 12 %
MAGNESIUM: 2 MG/DL (ref 1.8–2.4)
MCH RBC QN AUTO: 30.3 PG (ref 26–34)
MCHC RBC AUTO-ENTMCNC: 33.3 G/DL (ref 31–36)
MCV RBC AUTO: 91 FL (ref 80–100)
MONOCYTES ABSOLUTE: 1 K/UL (ref 0–1.3)
MONOCYTES RELATIVE PERCENT: 8.2 %
NEUTROPHILS ABSOLUTE: 9.5 K/UL (ref 1.7–7.7)
NEUTROPHILS RELATIVE PERCENT: 79.7 %
PDW BLD-RTO: 12.9 % (ref 12.4–15.4)
PHOSPHORUS: 3 MG/DL (ref 2.5–4.9)
PLATELET # BLD: 274 K/UL (ref 135–450)
PMV BLD AUTO: 7.9 FL (ref 5–10.5)
POTASSIUM REFLEX MAGNESIUM: 4.3 MMOL/L (ref 3.5–5.1)
RBC # BLD: 4.06 M/UL (ref 4–5.2)
SODIUM BLD-SCNC: 139 MMOL/L (ref 136–145)
WBC # BLD: 11.9 K/UL (ref 4–11)

## 2019-08-02 PROCEDURE — 6360000002 HC RX W HCPCS: Performed by: OBSTETRICS & GYNECOLOGY

## 2019-08-02 PROCEDURE — 36415 COLL VENOUS BLD VENIPUNCTURE: CPT

## 2019-08-02 PROCEDURE — 6370000000 HC RX 637 (ALT 250 FOR IP): Performed by: OBSTETRICS & GYNECOLOGY

## 2019-08-02 PROCEDURE — 85025 COMPLETE CBC W/AUTO DIFF WBC: CPT

## 2019-08-02 PROCEDURE — 83735 ASSAY OF MAGNESIUM: CPT

## 2019-08-02 PROCEDURE — 2580000003 HC RX 258: Performed by: OBSTETRICS & GYNECOLOGY

## 2019-08-02 PROCEDURE — 80048 BASIC METABOLIC PNL TOTAL CA: CPT

## 2019-08-02 PROCEDURE — 84100 ASSAY OF PHOSPHORUS: CPT

## 2019-08-02 PROCEDURE — 94761 N-INVAS EAR/PLS OXIMETRY MLT: CPT

## 2019-08-02 RX ORDER — IBUPROFEN 600 MG/1
600 TABLET ORAL EVERY 6 HOURS
Qty: 30 TABLET | Refills: 0 | Status: SHIPPED | OUTPATIENT
Start: 2019-08-02 | End: 2020-01-01

## 2019-08-02 RX ORDER — ACETAMINOPHEN 500 MG
500 TABLET ORAL EVERY 6 HOURS PRN
Qty: 30 TABLET | Refills: 0 | Status: SHIPPED | OUTPATIENT
Start: 2019-08-02 | End: 2020-01-01

## 2019-08-02 RX ORDER — SENNA PLUS 8.6 MG/1
2 TABLET ORAL NIGHTLY PRN
Qty: 10 TABLET | Refills: 0 | Status: SHIPPED | OUTPATIENT
Start: 2019-08-02 | End: 2020-01-01

## 2019-08-02 RX ADMIN — KETOROLAC TROMETHAMINE 15 MG: 15 INJECTION, SOLUTION INTRAMUSCULAR; INTRAVENOUS at 08:13

## 2019-08-02 RX ADMIN — ACETAMINOPHEN 500 MG: 500 TABLET ORAL at 08:13

## 2019-08-02 RX ADMIN — Medication 10 ML: at 08:16

## 2019-08-02 RX ADMIN — LOSARTAN POTASSIUM 75 MG: 25 TABLET ORAL at 08:13

## 2019-08-02 RX ADMIN — ENOXAPARIN SODIUM 40 MG: 40 INJECTION SUBCUTANEOUS at 08:13

## 2019-08-02 RX ADMIN — ACETAMINOPHEN 500 MG: 500 TABLET ORAL at 02:25

## 2019-08-02 RX ADMIN — DEXTROSE AND SODIUM CHLORIDE: 5; 450 INJECTION, SOLUTION INTRAVENOUS at 05:54

## 2019-08-02 RX ADMIN — KETOROLAC TROMETHAMINE 15 MG: 15 INJECTION, SOLUTION INTRAMUSCULAR; INTRAVENOUS at 02:25

## 2019-08-02 ASSESSMENT — PAIN SCALES - GENERAL
PAINLEVEL_OUTOF10: 1
PAINLEVEL_OUTOF10: 0
PAINLEVEL_OUTOF10: 0

## 2019-08-02 NOTE — PROGRESS NOTES
Patient is A&O. Patient is resting comfortably in bed, awake and quiet. Room air. Side rails are up x2. Patient is UAL. Call light is in reach. Patient has ambulated around unit. Patient tolerated well. Will continue to monitor patient per unit protocols.  Electronically signed by Bernie Pascual RN on 8/2/2019 at 10:25 AM

## 2019-08-02 NOTE — PROGRESS NOTES
Arcos removed with no difficulties. Reminded pt to call when needing to use the restroom. She verbalized understanding. Will monitor.  Electronically signed by Candace Perez RN on 8/2/2019 at 6:36 AM

## 2020-01-01 ENCOUNTER — APPOINTMENT (OUTPATIENT)
Dept: GENERAL RADIOLOGY | Age: 60
DRG: 208 | End: 2020-01-01
Payer: COMMERCIAL

## 2020-01-01 ENCOUNTER — HOSPITAL ENCOUNTER (INPATIENT)
Age: 60
LOS: 12 days | DRG: 208 | End: 2020-12-10
Attending: INTERNAL MEDICINE | Admitting: INTERNAL MEDICINE
Payer: COMMERCIAL

## 2020-01-01 VITALS
WEIGHT: 250.22 LBS | HEIGHT: 66 IN | BODY MASS INDEX: 40.21 KG/M2 | HEART RATE: 49 BPM | RESPIRATION RATE: 22 BRPM | TEMPERATURE: 97.9 F | DIASTOLIC BLOOD PRESSURE: 42 MMHG | OXYGEN SATURATION: 87 % | SYSTOLIC BLOOD PRESSURE: 103 MMHG

## 2020-01-01 LAB
A/G RATIO: 0.8 (ref 1.1–2.2)
A/G RATIO: 0.9 (ref 1.1–2.2)
A/G RATIO: 0.9 (ref 1.1–2.2)
ABO/RH: NORMAL
ALBUMIN SERPL-MCNC: 2.2 G/DL (ref 3.4–5)
ALBUMIN SERPL-MCNC: 2.3 G/DL (ref 3.4–5)
ALBUMIN SERPL-MCNC: 2.4 G/DL (ref 3.4–5)
ALBUMIN SERPL-MCNC: 2.5 G/DL (ref 3.4–5)
ALBUMIN SERPL-MCNC: 2.5 G/DL (ref 3.4–5)
ALBUMIN SERPL-MCNC: 2.8 G/DL (ref 3.4–5)
ALBUMIN SERPL-MCNC: 3 G/DL (ref 3.4–5)
ALBUMIN SERPL-MCNC: 3 G/DL (ref 3.4–5)
ALBUMIN SERPL-MCNC: 3.2 G/DL (ref 3.4–5)
ALBUMIN SERPL-MCNC: 3.3 G/DL (ref 3.4–5)
ALBUMIN SERPL-MCNC: 3.3 G/DL (ref 3.4–5)
ALBUMIN SERPL-MCNC: 3.5 G/DL (ref 3.4–5)
ALP BLD-CCNC: 100 U/L (ref 40–129)
ALP BLD-CCNC: 100 U/L (ref 40–129)
ALP BLD-CCNC: 103 U/L (ref 40–129)
ALP BLD-CCNC: 110 U/L (ref 40–129)
ALP BLD-CCNC: 61 U/L (ref 40–129)
ALP BLD-CCNC: 64 U/L (ref 40–129)
ALP BLD-CCNC: 70 U/L (ref 40–129)
ALP BLD-CCNC: 72 U/L (ref 40–129)
ALP BLD-CCNC: 76 U/L (ref 40–129)
ALP BLD-CCNC: 84 U/L (ref 40–129)
ALP BLD-CCNC: 85 U/L (ref 40–129)
ALP BLD-CCNC: 86 U/L (ref 40–129)
ALT SERPL-CCNC: 102 U/L (ref 10–40)
ALT SERPL-CCNC: 26 U/L (ref 10–40)
ALT SERPL-CCNC: 31 U/L (ref 10–40)
ALT SERPL-CCNC: 32 U/L (ref 10–40)
ALT SERPL-CCNC: 38 U/L (ref 10–40)
ALT SERPL-CCNC: 41 U/L (ref 10–40)
ALT SERPL-CCNC: 49 U/L (ref 10–40)
ALT SERPL-CCNC: 52 U/L (ref 10–40)
ALT SERPL-CCNC: 57 U/L (ref 10–40)
ALT SERPL-CCNC: 64 U/L (ref 10–40)
ALT SERPL-CCNC: 71 U/L (ref 10–40)
ALT SERPL-CCNC: 75 U/L (ref 10–40)
ANION GAP SERPL CALCULATED.3IONS-SCNC: 10 MMOL/L (ref 3–16)
ANION GAP SERPL CALCULATED.3IONS-SCNC: 11 MMOL/L (ref 3–16)
ANION GAP SERPL CALCULATED.3IONS-SCNC: 12 MMOL/L (ref 3–16)
ANION GAP SERPL CALCULATED.3IONS-SCNC: 13 MMOL/L (ref 3–16)
ANION GAP SERPL CALCULATED.3IONS-SCNC: 15 MMOL/L (ref 3–16)
ANION GAP SERPL CALCULATED.3IONS-SCNC: 7 MMOL/L (ref 3–16)
ANION GAP SERPL CALCULATED.3IONS-SCNC: 8 MMOL/L (ref 3–16)
ANION GAP SERPL CALCULATED.3IONS-SCNC: 8 MMOL/L (ref 3–16)
ANION GAP SERPL CALCULATED.3IONS-SCNC: 9 MMOL/L (ref 3–16)
ANTIBODY SCREEN: NORMAL
APTT: 30 SEC (ref 24.2–36.2)
AST SERPL-CCNC: 103 U/L (ref 15–37)
AST SERPL-CCNC: 16 U/L (ref 15–37)
AST SERPL-CCNC: 17 U/L (ref 15–37)
AST SERPL-CCNC: 20 U/L (ref 15–37)
AST SERPL-CCNC: 20 U/L (ref 15–37)
AST SERPL-CCNC: 24 U/L (ref 15–37)
AST SERPL-CCNC: 25 U/L (ref 15–37)
AST SERPL-CCNC: 28 U/L (ref 15–37)
AST SERPL-CCNC: 29 U/L (ref 15–37)
AST SERPL-CCNC: 34 U/L (ref 15–37)
AST SERPL-CCNC: 48 U/L (ref 15–37)
AST SERPL-CCNC: 52 U/L (ref 15–37)
BACTERIA: ABNORMAL /HPF
BASE EXCESS ARTERIAL: -0.6 MMOL/L (ref -3–3)
BASE EXCESS ARTERIAL: -0.8 MMOL/L (ref -3–3)
BASE EXCESS ARTERIAL: -0.9 MMOL/L (ref -3–3)
BASE EXCESS ARTERIAL: -1.1 MMOL/L (ref -3–3)
BASE EXCESS ARTERIAL: -1.8 MMOL/L (ref -3–3)
BASE EXCESS ARTERIAL: -2.1 MMOL/L (ref -3–3)
BASE EXCESS ARTERIAL: -3.4 MMOL/L (ref -3–3)
BASE EXCESS ARTERIAL: -3.7 MMOL/L (ref -3–3)
BASE EXCESS ARTERIAL: -4 MMOL/L (ref -3–3)
BASE EXCESS ARTERIAL: -4.1 MMOL/L (ref -3–3)
BASE EXCESS ARTERIAL: -4.3 MMOL/L (ref -3–3)
BASE EXCESS ARTERIAL: 0 MMOL/L (ref -3–3)
BASE EXCESS ARTERIAL: 1.1 MMOL/L (ref -3–3)
BASE EXCESS ARTERIAL: 2 MMOL/L (ref -3–3)
BASE EXCESS ARTERIAL: 3.4 MMOL/L (ref -3–3)
BASOPHILS ABSOLUTE: 0 K/UL (ref 0–0.2)
BASOPHILS ABSOLUTE: 0 K/UL (ref 0–0.2)
BASOPHILS RELATIVE PERCENT: 0.1 %
BASOPHILS RELATIVE PERCENT: 0.1 %
BILIRUB SERPL-MCNC: 0.3 MG/DL (ref 0–1)
BILIRUB SERPL-MCNC: 0.4 MG/DL (ref 0–1)
BILIRUB SERPL-MCNC: 0.5 MG/DL (ref 0–1)
BILIRUB SERPL-MCNC: 0.6 MG/DL (ref 0–1)
BILIRUB SERPL-MCNC: <0.2 MG/DL (ref 0–1)
BILIRUBIN DIRECT: <0.2 MG/DL (ref 0–0.3)
BILIRUBIN URINE: NEGATIVE
BILIRUBIN URINE: NEGATIVE
BILIRUBIN, INDIRECT: ABNORMAL MG/DL (ref 0–1)
BLOOD BANK DISPENSE STATUS: NORMAL
BLOOD BANK PRODUCT CODE: NORMAL
BLOOD, URINE: NEGATIVE
BLOOD, URINE: NEGATIVE
BPU ID: NORMAL
BUN BLDV-MCNC: 14 MG/DL (ref 7–20)
BUN BLDV-MCNC: 16 MG/DL (ref 7–20)
BUN BLDV-MCNC: 20 MG/DL (ref 7–20)
BUN BLDV-MCNC: 22 MG/DL (ref 7–20)
BUN BLDV-MCNC: 23 MG/DL (ref 7–20)
BUN BLDV-MCNC: 24 MG/DL (ref 7–20)
BUN BLDV-MCNC: 24 MG/DL (ref 7–20)
BUN BLDV-MCNC: 26 MG/DL (ref 7–20)
BUN BLDV-MCNC: 27 MG/DL (ref 7–20)
BUN BLDV-MCNC: 29 MG/DL (ref 7–20)
BUN BLDV-MCNC: 43 MG/DL (ref 7–20)
BUN BLDV-MCNC: 45 MG/DL (ref 7–20)
BUN BLDV-MCNC: 67 MG/DL (ref 7–20)
BUN BLDV-MCNC: 78 MG/DL (ref 7–20)
BUN BLDV-MCNC: 89 MG/DL (ref 7–20)
C-REACTIVE PROTEIN: 110.6 MG/L (ref 0–5.1)
C-REACTIVE PROTEIN: 118.6 MG/L (ref 0–5.1)
C-REACTIVE PROTEIN: 132.9 MG/L (ref 0–5.1)
C-REACTIVE PROTEIN: 45.8 MG/L (ref 0–5.1)
C-REACTIVE PROTEIN: 79.7 MG/L (ref 0–5.1)
CALCIUM SERPL-MCNC: 8 MG/DL (ref 8.3–10.6)
CALCIUM SERPL-MCNC: 8 MG/DL (ref 8.3–10.6)
CALCIUM SERPL-MCNC: 8.1 MG/DL (ref 8.3–10.6)
CALCIUM SERPL-MCNC: 8.2 MG/DL (ref 8.3–10.6)
CALCIUM SERPL-MCNC: 8.4 MG/DL (ref 8.3–10.6)
CALCIUM SERPL-MCNC: 8.6 MG/DL (ref 8.3–10.6)
CALCIUM SERPL-MCNC: 8.6 MG/DL (ref 8.3–10.6)
CALCIUM SERPL-MCNC: 8.8 MG/DL (ref 8.3–10.6)
CALCIUM SERPL-MCNC: 8.9 MG/DL (ref 8.3–10.6)
CALCIUM SERPL-MCNC: 9 MG/DL (ref 8.3–10.6)
CALCIUM SERPL-MCNC: 9.1 MG/DL (ref 8.3–10.6)
CALCIUM SERPL-MCNC: 9.3 MG/DL (ref 8.3–10.6)
CALCIUM SERPL-MCNC: 9.3 MG/DL (ref 8.3–10.6)
CARBOXYHEMOGLOBIN ARTERIAL: 0.2 % (ref 0–1.5)
CARBOXYHEMOGLOBIN ARTERIAL: 0.3 % (ref 0–1.5)
CARBOXYHEMOGLOBIN ARTERIAL: 0.4 % (ref 0–1.5)
CARBOXYHEMOGLOBIN ARTERIAL: 0.4 % (ref 0–1.5)
CARBOXYHEMOGLOBIN ARTERIAL: 0.5 % (ref 0–1.5)
CARBOXYHEMOGLOBIN ARTERIAL: 0.7 % (ref 0–1.5)
CARBOXYHEMOGLOBIN ARTERIAL: 0.8 % (ref 0–1.5)
CARBOXYHEMOGLOBIN ARTERIAL: 0.8 % (ref 0–1.5)
CARBOXYHEMOGLOBIN ARTERIAL: 1 % (ref 0–1.5)
CARBOXYHEMOGLOBIN ARTERIAL: 1.1 % (ref 0–1.5)
CARBOXYHEMOGLOBIN ARTERIAL: 1.2 % (ref 0–1.5)
CHLORIDE BLD-SCNC: 101 MMOL/L (ref 99–110)
CHLORIDE BLD-SCNC: 102 MMOL/L (ref 99–110)
CHLORIDE BLD-SCNC: 102 MMOL/L (ref 99–110)
CHLORIDE BLD-SCNC: 103 MMOL/L (ref 99–110)
CHLORIDE BLD-SCNC: 104 MMOL/L (ref 99–110)
CHLORIDE BLD-SCNC: 108 MMOL/L (ref 99–110)
CHLORIDE BLD-SCNC: 109 MMOL/L (ref 99–110)
CHLORIDE BLD-SCNC: 109 MMOL/L (ref 99–110)
CHLORIDE BLD-SCNC: 96 MMOL/L (ref 99–110)
CLARITY: ABNORMAL
CLARITY: ABNORMAL
CO2: 21 MMOL/L (ref 21–32)
CO2: 21 MMOL/L (ref 21–32)
CO2: 23 MMOL/L (ref 21–32)
CO2: 24 MMOL/L (ref 21–32)
CO2: 25 MMOL/L (ref 21–32)
CO2: 26 MMOL/L (ref 21–32)
CO2: 27 MMOL/L (ref 21–32)
CO2: 29 MMOL/L (ref 21–32)
COARSE CASTS, UA: ABNORMAL /LPF (ref 0–2)
COARSE CASTS, UA: ABNORMAL /LPF (ref 0–2)
COLOR: YELLOW
COLOR: YELLOW
CREAT SERPL-MCNC: 0.5 MG/DL (ref 0.6–1.2)
CREAT SERPL-MCNC: 0.5 MG/DL (ref 0.6–1.2)
CREAT SERPL-MCNC: 0.6 MG/DL (ref 0.6–1.2)
CREAT SERPL-MCNC: 0.7 MG/DL (ref 0.6–1.2)
CREAT SERPL-MCNC: 0.8 MG/DL (ref 0.6–1.2)
CREAT SERPL-MCNC: 1.7 MG/DL (ref 0.6–1.2)
CREAT SERPL-MCNC: 1.7 MG/DL (ref 0.6–1.2)
CREAT SERPL-MCNC: 1.9 MG/DL (ref 0.6–1.2)
CREAT SERPL-MCNC: 2 MG/DL (ref 0.6–1.2)
CREAT SERPL-MCNC: 2 MG/DL (ref 0.6–1.2)
CREAT SERPL-MCNC: <0.5 MG/DL (ref 0.6–1.2)
CREAT SERPL-MCNC: <0.5 MG/DL (ref 0.6–1.2)
CULTURE, RESPIRATORY: NORMAL
D DIMER: 648 NG/ML DDU (ref 0–229)
D DIMER: 649 NG/ML DDU (ref 0–229)
DESCRIPTION BLOOD BANK: NORMAL
EKG ATRIAL RATE: 84 BPM
EKG DIAGNOSIS: NORMAL
EKG P AXIS: 54 DEGREES
EKG P-R INTERVAL: 156 MS
EKG Q-T INTERVAL: 370 MS
EKG QRS DURATION: 82 MS
EKG QTC CALCULATION (BAZETT): 437 MS
EKG R AXIS: 33 DEGREES
EKG T AXIS: 31 DEGREES
EKG VENTRICULAR RATE: 84 BPM
EOSINOPHILS ABSOLUTE: 0 K/UL (ref 0–0.6)
EOSINOPHILS ABSOLUTE: 0 K/UL (ref 0–0.6)
EOSINOPHILS RELATIVE PERCENT: 0 %
EOSINOPHILS RELATIVE PERCENT: 0 %
EPITHELIAL CELLS, UA: 3 /HPF (ref 0–5)
EPITHELIAL CELLS, UA: 36 /HPF (ref 0–5)
ESTIMATED AVERAGE GLUCOSE: 125.5 MG/DL
FERRITIN: 1006 NG/ML (ref 15–150)
FERRITIN: 701.3 NG/ML (ref 15–150)
FERRITIN: 819.2 NG/ML (ref 15–150)
FERRITIN: 914.9 NG/ML (ref 15–150)
FERRITIN: 961.3 NG/ML (ref 15–150)
FIBRINOGEN: 792 MG/DL (ref 200–397)
FINE CASTS, UA: ABNORMAL /LPF (ref 0–2)
GFR AFRICAN AMERICAN: 30
GFR AFRICAN AMERICAN: 30
GFR AFRICAN AMERICAN: 32
GFR AFRICAN AMERICAN: 37
GFR AFRICAN AMERICAN: 37
GFR AFRICAN AMERICAN: >60
GFR NON-AFRICAN AMERICAN: 25
GFR NON-AFRICAN AMERICAN: 25
GFR NON-AFRICAN AMERICAN: 27
GFR NON-AFRICAN AMERICAN: 30
GFR NON-AFRICAN AMERICAN: 30
GFR NON-AFRICAN AMERICAN: >60
GLOBULIN: 3.6 G/DL
GLOBULIN: 3.7 G/DL
GLOBULIN: 3.8 G/DL
GLOBULIN: 4 G/DL
GLOBULIN: 4 G/DL
GLOBULIN: 4.1 G/DL
GLUCOSE BLD-MCNC: 111 MG/DL (ref 70–99)
GLUCOSE BLD-MCNC: 124 MG/DL (ref 70–99)
GLUCOSE BLD-MCNC: 126 MG/DL (ref 70–99)
GLUCOSE BLD-MCNC: 129 MG/DL (ref 70–99)
GLUCOSE BLD-MCNC: 133 MG/DL (ref 70–99)
GLUCOSE BLD-MCNC: 133 MG/DL (ref 70–99)
GLUCOSE BLD-MCNC: 137 MG/DL (ref 70–99)
GLUCOSE BLD-MCNC: 139 MG/DL (ref 70–99)
GLUCOSE BLD-MCNC: 139 MG/DL (ref 70–99)
GLUCOSE BLD-MCNC: 140 MG/DL (ref 70–99)
GLUCOSE BLD-MCNC: 142 MG/DL (ref 70–99)
GLUCOSE BLD-MCNC: 143 MG/DL (ref 70–99)
GLUCOSE BLD-MCNC: 145 MG/DL (ref 70–99)
GLUCOSE BLD-MCNC: 146 MG/DL (ref 70–99)
GLUCOSE BLD-MCNC: 148 MG/DL (ref 70–99)
GLUCOSE BLD-MCNC: 149 MG/DL (ref 70–99)
GLUCOSE BLD-MCNC: 151 MG/DL (ref 70–99)
GLUCOSE BLD-MCNC: 152 MG/DL (ref 70–99)
GLUCOSE BLD-MCNC: 154 MG/DL (ref 70–99)
GLUCOSE BLD-MCNC: 156 MG/DL (ref 70–99)
GLUCOSE BLD-MCNC: 157 MG/DL (ref 70–99)
GLUCOSE BLD-MCNC: 158 MG/DL (ref 70–99)
GLUCOSE BLD-MCNC: 164 MG/DL (ref 70–99)
GLUCOSE BLD-MCNC: 167 MG/DL (ref 70–99)
GLUCOSE BLD-MCNC: 167 MG/DL (ref 70–99)
GLUCOSE BLD-MCNC: 168 MG/DL (ref 70–99)
GLUCOSE BLD-MCNC: 170 MG/DL (ref 70–99)
GLUCOSE BLD-MCNC: 171 MG/DL (ref 70–99)
GLUCOSE BLD-MCNC: 182 MG/DL (ref 70–99)
GLUCOSE BLD-MCNC: 198 MG/DL (ref 70–99)
GLUCOSE BLD-MCNC: 202 MG/DL (ref 70–99)
GLUCOSE BLD-MCNC: 204 MG/DL (ref 70–99)
GLUCOSE BLD-MCNC: 205 MG/DL (ref 70–99)
GLUCOSE BLD-MCNC: 205 MG/DL (ref 70–99)
GLUCOSE BLD-MCNC: 206 MG/DL (ref 70–99)
GLUCOSE BLD-MCNC: 216 MG/DL (ref 70–99)
GLUCOSE BLD-MCNC: 227 MG/DL (ref 70–99)
GLUCOSE BLD-MCNC: 241 MG/DL (ref 70–99)
GLUCOSE BLD-MCNC: 270 MG/DL (ref 70–99)
GLUCOSE URINE: NEGATIVE MG/DL
GLUCOSE URINE: NEGATIVE MG/DL
GRAM STAIN RESULT: NORMAL
HBA1C MFR BLD: 6 %
HCO3 ARTERIAL: 23.4 MMOL/L (ref 21–29)
HCO3 ARTERIAL: 23.5 MMOL/L (ref 21–29)
HCO3 ARTERIAL: 23.7 MMOL/L (ref 21–29)
HCO3 ARTERIAL: 23.7 MMOL/L (ref 21–29)
HCO3 ARTERIAL: 24.6 MMOL/L (ref 21–29)
HCO3 ARTERIAL: 25.8 MMOL/L (ref 21–29)
HCO3 ARTERIAL: 26.1 MMOL/L (ref 21–29)
HCO3 ARTERIAL: 26.5 MMOL/L (ref 21–29)
HCO3 ARTERIAL: 26.7 MMOL/L (ref 21–29)
HCO3 ARTERIAL: 27.2 MMOL/L (ref 21–29)
HCO3 ARTERIAL: 27.6 MMOL/L (ref 21–29)
HCO3 ARTERIAL: 27.8 MMOL/L (ref 21–29)
HCO3 ARTERIAL: 27.9 MMOL/L (ref 21–29)
HCO3 ARTERIAL: 28.1 MMOL/L (ref 21–29)
HCO3 ARTERIAL: 28.4 MMOL/L (ref 21–29)
HCO3 ARTERIAL: 29.3 MMOL/L (ref 21–29)
HCO3 ARTERIAL: 30.1 MMOL/L (ref 21–29)
HCT VFR BLD CALC: 34.5 % (ref 36–48)
HCT VFR BLD CALC: 35.1 % (ref 36–48)
HCT VFR BLD CALC: 36 % (ref 36–48)
HCT VFR BLD CALC: 36.6 % (ref 36–48)
HCT VFR BLD CALC: 38.8 % (ref 36–48)
HCT VFR BLD CALC: 39.6 % (ref 36–48)
HCT VFR BLD CALC: 40.8 % (ref 36–48)
HCT VFR BLD CALC: 40.9 % (ref 36–48)
HCT VFR BLD CALC: 41.3 % (ref 36–48)
HCT VFR BLD CALC: 41.4 % (ref 36–48)
HCT VFR BLD CALC: 42 % (ref 36–48)
HCT VFR BLD CALC: 42.3 % (ref 36–48)
HCT VFR BLD CALC: 43 % (ref 36–48)
HEMOGLOBIN, ART, EXTENDED: 10.8 G/DL (ref 12–16)
HEMOGLOBIN, ART, EXTENDED: 11 G/DL (ref 12–16)
HEMOGLOBIN, ART, EXTENDED: 11.1 G/DL (ref 12–16)
HEMOGLOBIN, ART, EXTENDED: 11.1 G/DL (ref 12–16)
HEMOGLOBIN, ART, EXTENDED: 11.4 G/DL (ref 12–16)
HEMOGLOBIN, ART, EXTENDED: 11.7 G/DL (ref 12–16)
HEMOGLOBIN, ART, EXTENDED: 12.5 G/DL (ref 12–16)
HEMOGLOBIN, ART, EXTENDED: 12.6 G/DL (ref 12–16)
HEMOGLOBIN, ART, EXTENDED: 12.9 G/DL (ref 12–16)
HEMOGLOBIN, ART, EXTENDED: 13.6 G/DL (ref 12–16)
HEMOGLOBIN, ART, EXTENDED: 13.8 G/DL (ref 12–16)
HEMOGLOBIN, ART, EXTENDED: 14.4 G/DL (ref 12–16)
HEMOGLOBIN, ART, EXTENDED: 14.5 G/DL (ref 12–16)
HEMOGLOBIN, ART, EXTENDED: 14.6 G/DL (ref 12–16)
HEMOGLOBIN, ART, EXTENDED: 14.6 G/DL (ref 12–16)
HEMOGLOBIN: 11.2 G/DL (ref 12–16)
HEMOGLOBIN: 11.4 G/DL (ref 12–16)
HEMOGLOBIN: 11.8 G/DL (ref 12–16)
HEMOGLOBIN: 12 G/DL (ref 12–16)
HEMOGLOBIN: 13.2 G/DL (ref 12–16)
HEMOGLOBIN: 13.3 G/DL (ref 12–16)
HEMOGLOBIN: 13.7 G/DL (ref 12–16)
HEMOGLOBIN: 13.7 G/DL (ref 12–16)
HEMOGLOBIN: 14 G/DL (ref 12–16)
HEMOGLOBIN: 14.1 G/DL (ref 12–16)
HEMOGLOBIN: 14.2 G/DL (ref 12–16)
HEMOGLOBIN: 14.2 G/DL (ref 12–16)
HEMOGLOBIN: 14.4 G/DL (ref 12–16)
HYALINE CASTS: ABNORMAL /LPF (ref 0–2)
HYALINE CASTS: ABNORMAL /LPF (ref 0–2)
KETONES, URINE: NEGATIVE MG/DL
KETONES, URINE: NEGATIVE MG/DL
LACTIC ACID: 1.5 MMOL/L (ref 0.4–2)
LEUKOCYTE ESTERASE, URINE: ABNORMAL
LEUKOCYTE ESTERASE, URINE: NEGATIVE
LYMPHOCYTES ABSOLUTE: 0.5 K/UL (ref 1–5.1)
LYMPHOCYTES ABSOLUTE: 0.8 K/UL (ref 1–5.1)
LYMPHOCYTES RELATIVE PERCENT: 10.4 %
LYMPHOCYTES RELATIVE PERCENT: 4.2 %
MAGNESIUM: 2.3 MG/DL (ref 1.8–2.4)
MAGNESIUM: 3 MG/DL (ref 1.8–2.4)
MCH RBC QN AUTO: 30.7 PG (ref 26–34)
MCH RBC QN AUTO: 30.7 PG (ref 26–34)
MCH RBC QN AUTO: 30.8 PG (ref 26–34)
MCH RBC QN AUTO: 30.8 PG (ref 26–34)
MCH RBC QN AUTO: 30.9 PG (ref 26–34)
MCH RBC QN AUTO: 31 PG (ref 26–34)
MCH RBC QN AUTO: 31 PG (ref 26–34)
MCH RBC QN AUTO: 31.1 PG (ref 26–34)
MCH RBC QN AUTO: 31.2 PG (ref 26–34)
MCH RBC QN AUTO: 31.5 PG (ref 26–34)
MCH RBC QN AUTO: 31.6 PG (ref 26–34)
MCHC RBC AUTO-ENTMCNC: 31.9 G/DL (ref 31–36)
MCHC RBC AUTO-ENTMCNC: 32.2 G/DL (ref 31–36)
MCHC RBC AUTO-ENTMCNC: 33.1 G/DL (ref 31–36)
MCHC RBC AUTO-ENTMCNC: 33.3 G/DL (ref 31–36)
MCHC RBC AUTO-ENTMCNC: 33.4 G/DL (ref 31–36)
MCHC RBC AUTO-ENTMCNC: 33.4 G/DL (ref 31–36)
MCHC RBC AUTO-ENTMCNC: 33.5 G/DL (ref 31–36)
MCHC RBC AUTO-ENTMCNC: 33.5 G/DL (ref 31–36)
MCHC RBC AUTO-ENTMCNC: 33.6 G/DL (ref 31–36)
MCHC RBC AUTO-ENTMCNC: 33.7 G/DL (ref 31–36)
MCHC RBC AUTO-ENTMCNC: 34.1 G/DL (ref 31–36)
MCHC RBC AUTO-ENTMCNC: 34.1 G/DL (ref 31–36)
MCHC RBC AUTO-ENTMCNC: 34.4 G/DL (ref 31–36)
MCV RBC AUTO: 91.5 FL (ref 80–100)
MCV RBC AUTO: 91.6 FL (ref 80–100)
MCV RBC AUTO: 92.1 FL (ref 80–100)
MCV RBC AUTO: 92.1 FL (ref 80–100)
MCV RBC AUTO: 92.5 FL (ref 80–100)
MCV RBC AUTO: 92.7 FL (ref 80–100)
MCV RBC AUTO: 92.8 FL (ref 80–100)
MCV RBC AUTO: 93.3 FL (ref 80–100)
MCV RBC AUTO: 93.7 FL (ref 80–100)
MCV RBC AUTO: 95.4 FL (ref 80–100)
MCV RBC AUTO: 96.3 FL (ref 80–100)
METHEMOGLOBIN ARTERIAL: 0.2 %
METHEMOGLOBIN ARTERIAL: 0.5 %
METHEMOGLOBIN ARTERIAL: 0.6 %
METHEMOGLOBIN ARTERIAL: 0.7 %
METHEMOGLOBIN ARTERIAL: 0.8 %
MICROSCOPIC EXAMINATION: YES
MICROSCOPIC EXAMINATION: YES
MONOCYTES ABSOLUTE: 0.4 K/UL (ref 0–1.3)
MONOCYTES ABSOLUTE: 0.5 K/UL (ref 0–1.3)
MONOCYTES RELATIVE PERCENT: 4 %
MONOCYTES RELATIVE PERCENT: 5.4 %
MRSA SCREEN RT-PCR: NORMAL
MUCUS: ABNORMAL /LPF
NEUTROPHILS ABSOLUTE: 11.6 K/UL (ref 1.7–7.7)
NEUTROPHILS ABSOLUTE: 6.8 K/UL (ref 1.7–7.7)
NEUTROPHILS RELATIVE PERCENT: 84.1 %
NEUTROPHILS RELATIVE PERCENT: 91.7 %
NITRITE, URINE: NEGATIVE
NITRITE, URINE: NEGATIVE
O2 CONTENT ARTERIAL: 13 ML/DL
O2 CONTENT ARTERIAL: 14 ML/DL
O2 CONTENT ARTERIAL: 15 ML/DL
O2 CONTENT ARTERIAL: 16 ML/DL
O2 CONTENT ARTERIAL: 17 ML/DL
O2 CONTENT ARTERIAL: 18 ML/DL
O2 CONTENT ARTERIAL: 19 ML/DL
O2 CONTENT ARTERIAL: 20 ML/DL
O2 SAT, ARTERIAL: 85.8 %
O2 SAT, ARTERIAL: 89.7 %
O2 SAT, ARTERIAL: 89.8 %
O2 SAT, ARTERIAL: 90.8 %
O2 SAT, ARTERIAL: 92.1 %
O2 SAT, ARTERIAL: 92.2 %
O2 SAT, ARTERIAL: 93.2 %
O2 SAT, ARTERIAL: 94.5 %
O2 SAT, ARTERIAL: 95.9 %
O2 SAT, ARTERIAL: 96.5 %
O2 SAT, ARTERIAL: 96.7 %
O2 SAT, ARTERIAL: 97.2 %
O2 SAT, ARTERIAL: 97.4 %
O2 SAT, ARTERIAL: 97.5 %
O2 SAT, ARTERIAL: 97.5 %
O2 SAT, ARTERIAL: 98.2 %
O2 SAT, ARTERIAL: 98.5 %
O2 THERAPY: ABNORMAL
ORGANISM: ABNORMAL
PCO2 ARTERIAL: 40.5 MMHG (ref 35–45)
PCO2 ARTERIAL: 52.6 MMHG (ref 35–45)
PCO2 ARTERIAL: 53.4 MMHG (ref 35–45)
PCO2 ARTERIAL: 55.1 MMHG (ref 35–45)
PCO2 ARTERIAL: 55.2 MMHG (ref 35–45)
PCO2 ARTERIAL: 56.5 MMHG (ref 35–45)
PCO2 ARTERIAL: 59.5 MMHG (ref 35–45)
PCO2 ARTERIAL: 60.5 MMHG (ref 35–45)
PCO2 ARTERIAL: 61.3 MMHG (ref 35–45)
PCO2 ARTERIAL: 62.3 MMHG (ref 35–45)
PCO2 ARTERIAL: 62.3 MMHG (ref 35–45)
PCO2 ARTERIAL: 63.5 MMHG (ref 35–45)
PCO2 ARTERIAL: 64.6 MMHG (ref 35–45)
PCO2 ARTERIAL: 64.6 MMHG (ref 35–45)
PCO2 ARTERIAL: 66.2 MMHG (ref 35–45)
PCO2 ARTERIAL: 67.5 MMHG (ref 35–45)
PCO2 ARTERIAL: 68 MMHG (ref 35–45)
PDW BLD-RTO: 12.3 % (ref 12.4–15.4)
PDW BLD-RTO: 12.4 % (ref 12.4–15.4)
PDW BLD-RTO: 12.4 % (ref 12.4–15.4)
PDW BLD-RTO: 12.5 % (ref 12.4–15.4)
PDW BLD-RTO: 12.5 % (ref 12.4–15.4)
PDW BLD-RTO: 12.6 % (ref 12.4–15.4)
PDW BLD-RTO: 12.6 % (ref 12.4–15.4)
PDW BLD-RTO: 12.7 % (ref 12.4–15.4)
PDW BLD-RTO: 12.8 % (ref 12.4–15.4)
PDW BLD-RTO: 12.9 % (ref 12.4–15.4)
PDW BLD-RTO: 13.3 % (ref 12.4–15.4)
PERFORMED ON: ABNORMAL
PH ARTERIAL: 7.19 (ref 7.35–7.45)
PH ARTERIAL: 7.19 (ref 7.35–7.45)
PH ARTERIAL: 7.22 (ref 7.35–7.45)
PH ARTERIAL: 7.22 (ref 7.35–7.45)
PH ARTERIAL: 7.24 (ref 7.35–7.45)
PH ARTERIAL: 7.25 (ref 7.35–7.45)
PH ARTERIAL: 7.25 (ref 7.35–7.45)
PH ARTERIAL: 7.26 (ref 7.35–7.45)
PH ARTERIAL: 7.27 (ref 7.35–7.45)
PH ARTERIAL: 7.29 (ref 7.35–7.45)
PH ARTERIAL: 7.44 (ref 7.35–7.45)
PH UA: 5.5 (ref 5–8)
PH UA: 6 (ref 5–8)
PHOSPHORUS: 3.5 MG/DL (ref 2.5–4.9)
PLATELET # BLD: 244 K/UL (ref 135–450)
PLATELET # BLD: 248 K/UL (ref 135–450)
PLATELET # BLD: 296 K/UL (ref 135–450)
PLATELET # BLD: 307 K/UL (ref 135–450)
PLATELET # BLD: 319 K/UL (ref 135–450)
PLATELET # BLD: 327 K/UL (ref 135–450)
PLATELET # BLD: 329 K/UL (ref 135–450)
PLATELET # BLD: 331 K/UL (ref 135–450)
PLATELET # BLD: 344 K/UL (ref 135–450)
PLATELET # BLD: 379 K/UL (ref 135–450)
PLATELET # BLD: 396 K/UL (ref 135–450)
PLATELET # BLD: 399 K/UL (ref 135–450)
PLATELET # BLD: 400 K/UL (ref 135–450)
PMV BLD AUTO: 7.5 FL (ref 5–10.5)
PMV BLD AUTO: 7.5 FL (ref 5–10.5)
PMV BLD AUTO: 7.6 FL (ref 5–10.5)
PMV BLD AUTO: 7.6 FL (ref 5–10.5)
PMV BLD AUTO: 7.7 FL (ref 5–10.5)
PMV BLD AUTO: 8.1 FL (ref 5–10.5)
PMV BLD AUTO: 8.3 FL (ref 5–10.5)
PMV BLD AUTO: 8.4 FL (ref 5–10.5)
PMV BLD AUTO: 8.5 FL (ref 5–10.5)
PMV BLD AUTO: 8.5 FL (ref 5–10.5)
PMV BLD AUTO: 8.8 FL (ref 5–10.5)
PO2 ARTERIAL: 104 MMHG (ref 75–108)
PO2 ARTERIAL: 105 MMHG (ref 75–108)
PO2 ARTERIAL: 106 MMHG (ref 75–108)
PO2 ARTERIAL: 115 MMHG (ref 75–108)
PO2 ARTERIAL: 126 MMHG (ref 75–108)
PO2 ARTERIAL: 48.9 MMHG (ref 75–108)
PO2 ARTERIAL: 59.4 MMHG (ref 75–108)
PO2 ARTERIAL: 61.9 MMHG (ref 75–108)
PO2 ARTERIAL: 62.8 MMHG (ref 75–108)
PO2 ARTERIAL: 67.1 MMHG (ref 75–108)
PO2 ARTERIAL: 67.6 MMHG (ref 75–108)
PO2 ARTERIAL: 75.3 MMHG (ref 75–108)
PO2 ARTERIAL: 79.7 MMHG (ref 75–108)
PO2 ARTERIAL: 88.6 MMHG (ref 75–108)
PO2 ARTERIAL: 93.7 MMHG (ref 75–108)
PO2 ARTERIAL: 94.7 MMHG (ref 75–108)
PO2 ARTERIAL: 96.5 MMHG (ref 75–108)
POTASSIUM REFLEX MAGNESIUM: 4.2 MMOL/L (ref 3.5–5.1)
POTASSIUM REFLEX MAGNESIUM: 4.4 MMOL/L (ref 3.5–5.1)
POTASSIUM REFLEX MAGNESIUM: 4.6 MMOL/L (ref 3.5–5.1)
POTASSIUM REFLEX MAGNESIUM: 4.8 MMOL/L (ref 3.5–5.1)
POTASSIUM REFLEX MAGNESIUM: 5.2 MMOL/L (ref 3.5–5.1)
POTASSIUM REFLEX MAGNESIUM: 5.4 MMOL/L (ref 3.5–5.1)
POTASSIUM REFLEX MAGNESIUM: 5.7 MMOL/L (ref 3.5–5.1)
POTASSIUM SERPL-SCNC: 3.6 MMOL/L (ref 3.5–5.1)
POTASSIUM SERPL-SCNC: 3.9 MMOL/L (ref 3.5–5.1)
POTASSIUM SERPL-SCNC: 4 MMOL/L (ref 3.5–5.1)
POTASSIUM SERPL-SCNC: 4.1 MMOL/L (ref 3.5–5.1)
POTASSIUM SERPL-SCNC: 4.2 MMOL/L (ref 3.5–5.1)
POTASSIUM SERPL-SCNC: 4.3 MMOL/L (ref 3.5–5.1)
POTASSIUM SERPL-SCNC: 5.4 MMOL/L (ref 3.5–5.1)
POTASSIUM SERPL-SCNC: 5.5 MMOL/L (ref 3.5–5.1)
PRO-BNP: 183 PG/ML (ref 0–124)
PRO-BNP: 219 PG/ML (ref 0–124)
PROCALCITONIN: 0.08 NG/ML (ref 0–0.15)
PROCALCITONIN: 0.08 NG/ML (ref 0–0.15)
PROTEIN UA: 30 MG/DL
PROTEIN UA: 30 MG/DL
RBC # BLD: 3.65 M/UL (ref 4–5.2)
RBC # BLD: 3.69 M/UL (ref 4–5.2)
RBC # BLD: 3.84 M/UL (ref 4–5.2)
RBC # BLD: 3.89 M/UL (ref 4–5.2)
RBC # BLD: 4.18 M/UL (ref 4–5.2)
RBC # BLD: 4.27 M/UL (ref 4–5.2)
RBC # BLD: 4.41 M/UL (ref 4–5.2)
RBC # BLD: 4.43 M/UL (ref 4–5.2)
RBC # BLD: 4.5 M/UL (ref 4–5.2)
RBC # BLD: 4.51 M/UL (ref 4–5.2)
RBC # BLD: 4.52 M/UL (ref 4–5.2)
RBC # BLD: 4.59 M/UL (ref 4–5.2)
RBC # BLD: 4.64 M/UL (ref 4–5.2)
RBC UA: 4 /HPF (ref 0–4)
REASON FOR REJECTION: NORMAL
REASON FOR REJECTION: NORMAL
REJECTED TEST: NORMAL
REJECTED TEST: NORMAL
RENAL EPITHELIAL, UA: ABNORMAL /HPF (ref 0–1)
RENAL EPITHELIAL, UA: ABNORMAL /HPF (ref 0–1)
SARS-COV-2, NAAT: DETECTED
SODIUM BLD-SCNC: 132 MMOL/L (ref 136–145)
SODIUM BLD-SCNC: 134 MMOL/L (ref 136–145)
SODIUM BLD-SCNC: 136 MMOL/L (ref 136–145)
SODIUM BLD-SCNC: 136 MMOL/L (ref 136–145)
SODIUM BLD-SCNC: 137 MMOL/L (ref 136–145)
SODIUM BLD-SCNC: 138 MMOL/L (ref 136–145)
SODIUM BLD-SCNC: 139 MMOL/L (ref 136–145)
SODIUM BLD-SCNC: 140 MMOL/L (ref 136–145)
SODIUM BLD-SCNC: 141 MMOL/L (ref 136–145)
SODIUM BLD-SCNC: 142 MMOL/L (ref 136–145)
SODIUM URINE: <20 MMOL/L
SPECIFIC GRAVITY UA: 1.02 (ref 1–1.03)
SPECIFIC GRAVITY UA: 1.02 (ref 1–1.03)
TCO2 ARTERIAL: 25 MMOL/L
TCO2 ARTERIAL: 25.2 MMOL/L
TCO2 ARTERIAL: 25.3 MMOL/L
TCO2 ARTERIAL: 25.4 MMOL/L
TCO2 ARTERIAL: 26.4 MMOL/L
TCO2 ARTERIAL: 27.9 MMOL/L
TCO2 ARTERIAL: 28.2 MMOL/L
TCO2 ARTERIAL: 28.4 MMOL/L
TCO2 ARTERIAL: 28.6 MMOL/L
TCO2 ARTERIAL: 28.9 MMOL/L
TCO2 ARTERIAL: 29 MMOL/L
TCO2 ARTERIAL: 29.6 MMOL/L
TCO2 ARTERIAL: 29.8 MMOL/L
TCO2 ARTERIAL: 30.1 MMOL/L
TCO2 ARTERIAL: 30.3 MMOL/L
TCO2 ARTERIAL: 31.1 MMOL/L
TCO2 ARTERIAL: 32 MMOL/L
TOTAL CK: 230 U/L (ref 26–192)
TOTAL PROTEIN: 5.4 G/DL (ref 6.4–8.2)
TOTAL PROTEIN: 5.5 G/DL (ref 6.4–8.2)
TOTAL PROTEIN: 5.6 G/DL (ref 6.4–8.2)
TOTAL PROTEIN: 5.8 G/DL (ref 6.4–8.2)
TOTAL PROTEIN: 5.9 G/DL (ref 6.4–8.2)
TOTAL PROTEIN: 6.6 G/DL (ref 6.4–8.2)
TOTAL PROTEIN: 6.7 G/DL (ref 6.4–8.2)
TOTAL PROTEIN: 6.7 G/DL (ref 6.4–8.2)
TOTAL PROTEIN: 7.1 G/DL (ref 6.4–8.2)
TOTAL PROTEIN: 7.3 G/DL (ref 6.4–8.2)
TOTAL PROTEIN: 7.3 G/DL (ref 6.4–8.2)
TOTAL PROTEIN: 7.5 G/DL (ref 6.4–8.2)
TROPONIN: <0.01 NG/ML
TROPONIN: <0.01 NG/ML
URINE CULTURE, ROUTINE: ABNORMAL
URINE REFLEX TO CULTURE: YES
URINE TYPE: ABNORMAL
URINE TYPE: ABNORMAL
UROBILINOGEN, URINE: 0.2 E.U./DL
UROBILINOGEN, URINE: 1 E.U./DL
WBC # BLD: 11.8 K/UL (ref 4–11)
WBC # BLD: 12.2 K/UL (ref 4–11)
WBC # BLD: 12.7 K/UL (ref 4–11)
WBC # BLD: 13 K/UL (ref 4–11)
WBC # BLD: 13.7 K/UL (ref 4–11)
WBC # BLD: 13.9 K/UL (ref 4–11)
WBC # BLD: 14.7 K/UL (ref 4–11)
WBC # BLD: 15.7 K/UL (ref 4–11)
WBC # BLD: 16.6 K/UL (ref 4–11)
WBC # BLD: 17.9 K/UL (ref 4–11)
WBC # BLD: 18.1 K/UL (ref 4–11)
WBC # BLD: 19 K/UL (ref 4–11)
WBC # BLD: 8.1 K/UL (ref 4–11)
WBC UA: 168 /HPF (ref 0–5)
WBC UA: 67 /HPF (ref 0–5)
YEAST: PRESENT /HPF
YEAST: PRESENT /HPF

## 2020-01-01 PROCEDURE — 02HV33Z INSERTION OF INFUSION DEVICE INTO SUPERIOR VENA CAVA, PERCUTANEOUS APPROACH: ICD-10-PCS | Performed by: FAMILY MEDICINE

## 2020-01-01 PROCEDURE — 94002 VENT MGMT INPAT INIT DAY: CPT

## 2020-01-01 PROCEDURE — 36600 WITHDRAWAL OF ARTERIAL BLOOD: CPT

## 2020-01-01 PROCEDURE — 6360000002 HC RX W HCPCS: Performed by: INTERNAL MEDICINE

## 2020-01-01 PROCEDURE — 99233 SBSQ HOSP IP/OBS HIGH 50: CPT | Performed by: INTERNAL MEDICINE

## 2020-01-01 PROCEDURE — 99292 CRITICAL CARE ADDL 30 MIN: CPT | Performed by: INTERNAL MEDICINE

## 2020-01-01 PROCEDURE — 6370000000 HC RX 637 (ALT 250 FOR IP): Performed by: INTERNAL MEDICINE

## 2020-01-01 PROCEDURE — 6370000000 HC RX 637 (ALT 250 FOR IP): Performed by: FAMILY MEDICINE

## 2020-01-01 PROCEDURE — 94761 N-INVAS EAR/PLS OXIMETRY MLT: CPT

## 2020-01-01 PROCEDURE — 36620 INSERTION CATHETER ARTERY: CPT

## 2020-01-01 PROCEDURE — 2500000003 HC RX 250 WO HCPCS: Performed by: INTERNAL MEDICINE

## 2020-01-01 PROCEDURE — 80076 HEPATIC FUNCTION PANEL: CPT

## 2020-01-01 PROCEDURE — 36592 COLLECT BLOOD FROM PICC: CPT

## 2020-01-01 PROCEDURE — 99285 EMERGENCY DEPT VISIT HI MDM: CPT

## 2020-01-01 PROCEDURE — 36415 COLL VENOUS BLD VENIPUNCTURE: CPT

## 2020-01-01 PROCEDURE — 2700000000 HC OXYGEN THERAPY PER DAY

## 2020-01-01 PROCEDURE — 82728 ASSAY OF FERRITIN: CPT

## 2020-01-01 PROCEDURE — 87070 CULTURE OTHR SPECIMN AEROBIC: CPT

## 2020-01-01 PROCEDURE — 94750 HC PULMONARY COMPLIANCE STUDY: CPT

## 2020-01-01 PROCEDURE — 76937 US GUIDE VASCULAR ACCESS: CPT | Performed by: INTERNAL MEDICINE

## 2020-01-01 PROCEDURE — 82803 BLOOD GASES ANY COMBINATION: CPT

## 2020-01-01 PROCEDURE — 6360000002 HC RX W HCPCS: Performed by: FAMILY MEDICINE

## 2020-01-01 PROCEDURE — 86140 C-REACTIVE PROTEIN: CPT

## 2020-01-01 PROCEDURE — 2580000003 HC RX 258: Performed by: INTERNAL MEDICINE

## 2020-01-01 PROCEDURE — 2580000003 HC RX 258: Performed by: FAMILY MEDICINE

## 2020-01-01 PROCEDURE — 81001 URINALYSIS AUTO W/SCOPE: CPT

## 2020-01-01 PROCEDURE — 87040 BLOOD CULTURE FOR BACTERIA: CPT

## 2020-01-01 PROCEDURE — 85027 COMPLETE CBC AUTOMATED: CPT

## 2020-01-01 PROCEDURE — 93010 ELECTROCARDIOGRAM REPORT: CPT | Performed by: INTERNAL MEDICINE

## 2020-01-01 PROCEDURE — 94660 CPAP INITIATION&MGMT: CPT

## 2020-01-01 PROCEDURE — 71045 X-RAY EXAM CHEST 1 VIEW: CPT

## 2020-01-01 PROCEDURE — 99291 CRITICAL CARE FIRST HOUR: CPT | Performed by: INTERNAL MEDICINE

## 2020-01-01 PROCEDURE — 94003 VENT MGMT INPAT SUBQ DAY: CPT

## 2020-01-01 PROCEDURE — 80048 BASIC METABOLIC PNL TOTAL CA: CPT

## 2020-01-01 PROCEDURE — 97162 PT EVAL MOD COMPLEX 30 MIN: CPT

## 2020-01-01 PROCEDURE — 87205 SMEAR GRAM STAIN: CPT

## 2020-01-01 PROCEDURE — 2060000000 HC ICU INTERMEDIATE R&B

## 2020-01-01 PROCEDURE — 2500000003 HC RX 250 WO HCPCS: Performed by: FAMILY MEDICINE

## 2020-01-01 PROCEDURE — 36569 INSJ PICC 5 YR+ W/O IMAGING: CPT

## 2020-01-01 PROCEDURE — 76937 US GUIDE VASCULAR ACCESS: CPT

## 2020-01-01 PROCEDURE — 86901 BLOOD TYPING SEROLOGIC RH(D): CPT

## 2020-01-01 PROCEDURE — 83735 ASSAY OF MAGNESIUM: CPT

## 2020-01-01 PROCEDURE — 86900 BLOOD TYPING SEROLOGIC ABO: CPT

## 2020-01-01 PROCEDURE — 5A1945Z RESPIRATORY VENTILATION, 24-96 CONSECUTIVE HOURS: ICD-10-PCS | Performed by: INTERNAL MEDICINE

## 2020-01-01 PROCEDURE — 80053 COMPREHEN METABOLIC PANEL: CPT

## 2020-01-01 PROCEDURE — 84484 ASSAY OF TROPONIN QUANT: CPT

## 2020-01-01 PROCEDURE — 84145 PROCALCITONIN (PCT): CPT

## 2020-01-01 PROCEDURE — 2000000000 HC ICU R&B

## 2020-01-01 PROCEDURE — 83605 ASSAY OF LACTIC ACID: CPT

## 2020-01-01 PROCEDURE — 97530 THERAPEUTIC ACTIVITIES: CPT

## 2020-01-01 PROCEDURE — U0002 COVID-19 LAB TEST NON-CDC: HCPCS

## 2020-01-01 PROCEDURE — 85384 FIBRINOGEN ACTIVITY: CPT

## 2020-01-01 PROCEDURE — 94640 AIRWAY INHALATION TREATMENT: CPT

## 2020-01-01 PROCEDURE — 93005 ELECTROCARDIOGRAM TRACING: CPT | Performed by: PHYSICIAN ASSISTANT

## 2020-01-01 PROCEDURE — 83880 ASSAY OF NATRIURETIC PEPTIDE: CPT

## 2020-01-01 PROCEDURE — 6360000002 HC RX W HCPCS: Performed by: PHYSICIAN ASSISTANT

## 2020-01-01 PROCEDURE — 0BH18EZ INSERTION OF ENDOTRACHEAL AIRWAY INTO TRACHEA, VIA NATURAL OR ARTIFICIAL OPENING ENDOSCOPIC: ICD-10-PCS | Performed by: INTERNAL MEDICINE

## 2020-01-01 PROCEDURE — 86850 RBC ANTIBODY SCREEN: CPT

## 2020-01-01 PROCEDURE — C1751 CATH, INF, PER/CENT/MIDLINE: HCPCS

## 2020-01-01 PROCEDURE — 85379 FIBRIN DEGRADATION QUANT: CPT

## 2020-01-01 PROCEDURE — XW033E5 INTRODUCTION OF REMDESIVIR ANTI-INFECTIVE INTO PERIPHERAL VEIN, PERCUTANEOUS APPROACH, NEW TECHNOLOGY GROUP 5: ICD-10-PCS | Performed by: FAMILY MEDICINE

## 2020-01-01 PROCEDURE — 84100 ASSAY OF PHOSPHORUS: CPT

## 2020-01-01 PROCEDURE — 85025 COMPLETE CBC W/AUTO DIFF WBC: CPT

## 2020-01-01 PROCEDURE — 84300 ASSAY OF URINE SODIUM: CPT

## 2020-01-01 PROCEDURE — 82550 ASSAY OF CK (CPK): CPT

## 2020-01-01 PROCEDURE — 37799 UNLISTED PX VASCULAR SURGERY: CPT

## 2020-01-01 PROCEDURE — 89220 SPUTUM SPECIMEN COLLECTION: CPT

## 2020-01-01 PROCEDURE — 83036 HEMOGLOBIN GLYCOSYLATED A1C: CPT

## 2020-01-01 PROCEDURE — 36620 INSERTION CATHETER ARTERY: CPT | Performed by: INTERNAL MEDICINE

## 2020-01-01 PROCEDURE — 99255 IP/OBS CONSLTJ NEW/EST HI 80: CPT | Performed by: INTERNAL MEDICINE

## 2020-01-01 PROCEDURE — XW13325 TRANSFUSION OF CONVALESCENT PLASMA (NONAUTOLOGOUS) INTO PERIPHERAL VEIN, PERCUTANEOUS APPROACH, NEW TECHNOLOGY GROUP 5: ICD-10-PCS | Performed by: FAMILY MEDICINE

## 2020-01-01 PROCEDURE — 87086 URINE CULTURE/COLONY COUNT: CPT

## 2020-01-01 PROCEDURE — 94760 N-INVAS EAR/PLS OXIMETRY 1: CPT

## 2020-01-01 PROCEDURE — 6370000000 HC RX 637 (ALT 250 FOR IP): Performed by: PHYSICIAN ASSISTANT

## 2020-01-01 PROCEDURE — 31500 INSERT EMERGENCY AIRWAY: CPT | Performed by: INTERNAL MEDICINE

## 2020-01-01 PROCEDURE — 87641 MR-STAPH DNA AMP PROBE: CPT

## 2020-01-01 PROCEDURE — 85730 THROMBOPLASTIN TIME PARTIAL: CPT

## 2020-01-01 RX ORDER — DEXAMETHASONE SODIUM PHOSPHATE 10 MG/ML
10 INJECTION, SOLUTION INTRAMUSCULAR; INTRAVENOUS ONCE
Status: COMPLETED | OUTPATIENT
Start: 2020-01-01 | End: 2020-01-01

## 2020-01-01 RX ORDER — SODIUM CHLORIDE 0.9 % (FLUSH) 0.9 %
10 SYRINGE (ML) INJECTION EVERY 12 HOURS SCHEDULED
Status: DISCONTINUED | OUTPATIENT
Start: 2020-01-01 | End: 2020-01-01 | Stop reason: SDUPTHER

## 2020-01-01 RX ORDER — ZINC SULFATE 50(220)MG
50 CAPSULE ORAL 2 TIMES DAILY
Status: DISCONTINUED | OUTPATIENT
Start: 2020-01-01 | End: 2020-01-01

## 2020-01-01 RX ORDER — SODIUM CHLORIDE 0.9 % (FLUSH) 0.9 %
10 SYRINGE (ML) INJECTION PRN
Status: DISCONTINUED | OUTPATIENT
Start: 2020-01-01 | End: 2020-01-01 | Stop reason: SDUPTHER

## 2020-01-01 RX ORDER — SODIUM CHLORIDE 0.9 % (FLUSH) 0.9 %
10 SYRINGE (ML) INJECTION PRN
Status: DISCONTINUED | OUTPATIENT
Start: 2020-01-01 | End: 2020-01-01

## 2020-01-01 RX ORDER — LOSARTAN POTASSIUM 25 MG/1
75 TABLET ORAL DAILY
Status: DISCONTINUED | OUTPATIENT
Start: 2020-01-01 | End: 2020-01-01

## 2020-01-01 RX ORDER — IPRATROPIUM BROMIDE AND ALBUTEROL SULFATE 2.5; .5 MG/3ML; MG/3ML
1 SOLUTION RESPIRATORY (INHALATION)
Status: DISCONTINUED | OUTPATIENT
Start: 2020-01-01 | End: 2020-01-01 | Stop reason: CLARIF

## 2020-01-01 RX ORDER — FAMOTIDINE 20 MG/1
20 TABLET, FILM COATED ORAL 2 TIMES DAILY
Status: DISCONTINUED | OUTPATIENT
Start: 2020-01-01 | End: 2020-01-01

## 2020-01-01 RX ORDER — METOPROLOL TARTRATE 5 MG/5ML
5 INJECTION INTRAVENOUS EVERY 6 HOURS PRN
Status: DISCONTINUED | OUTPATIENT
Start: 2020-01-01 | End: 2020-01-01

## 2020-01-01 RX ORDER — VITAMIN B COMPLEX
2000 TABLET ORAL DAILY
Status: DISCONTINUED | OUTPATIENT
Start: 2020-01-01 | End: 2020-01-01

## 2020-01-01 RX ORDER — GUAIFENESIN/DEXTROMETHORPHAN 100-10MG/5
5 SYRUP ORAL EVERY 4 HOURS PRN
Status: DISCONTINUED | OUTPATIENT
Start: 2020-01-01 | End: 2020-01-01

## 2020-01-01 RX ORDER — SODIUM POLYSTYRENE SULFONATE 15 G/60ML
15 SUSPENSION ORAL; RECTAL ONCE
Status: COMPLETED | OUTPATIENT
Start: 2020-01-01 | End: 2020-01-01

## 2020-01-01 RX ORDER — HEPARIN SODIUM 10000 [USP'U]/ML
7500 INJECTION, SOLUTION INTRAVENOUS; SUBCUTANEOUS EVERY 8 HOURS SCHEDULED
Status: DISCONTINUED | OUTPATIENT
Start: 2020-01-01 | End: 2020-01-01

## 2020-01-01 RX ORDER — POLYETHYLENE GLYCOL 3350 17 G/17G
17 POWDER, FOR SOLUTION ORAL DAILY
Status: DISCONTINUED | OUTPATIENT
Start: 2020-01-01 | End: 2020-01-01

## 2020-01-01 RX ORDER — PROMETHAZINE HYDROCHLORIDE 25 MG/1
12.5 TABLET ORAL EVERY 6 HOURS PRN
Status: DISCONTINUED | OUTPATIENT
Start: 2020-01-01 | End: 2020-01-01

## 2020-01-01 RX ORDER — LORAZEPAM 0.5 MG/1
0.5 TABLET ORAL 2 TIMES DAILY PRN
Status: DISCONTINUED | OUTPATIENT
Start: 2020-01-01 | End: 2020-01-01

## 2020-01-01 RX ORDER — MIDAZOLAM HYDROCHLORIDE 1 MG/ML
2 INJECTION INTRAMUSCULAR; INTRAVENOUS
Status: DISCONTINUED | OUTPATIENT
Start: 2020-01-01 | End: 2020-01-01

## 2020-01-01 RX ORDER — ASCORBIC ACID 500 MG
1000 TABLET ORAL 3 TIMES DAILY
Status: DISCONTINUED | OUTPATIENT
Start: 2020-01-01 | End: 2020-01-01

## 2020-01-01 RX ORDER — ACETAMINOPHEN 650 MG/1
650 SUPPOSITORY RECTAL EVERY 6 HOURS PRN
Status: DISCONTINUED | OUTPATIENT
Start: 2020-01-01 | End: 2020-01-01

## 2020-01-01 RX ORDER — MORPHINE SULFATE 4 MG/ML
4 INJECTION, SOLUTION INTRAMUSCULAR; INTRAVENOUS
Status: DISCONTINUED | OUTPATIENT
Start: 2020-01-01 | End: 2020-01-01 | Stop reason: HOSPADM

## 2020-01-01 RX ORDER — DEXAMETHASONE SODIUM PHOSPHATE 10 MG/ML
INJECTION, SOLUTION INTRAMUSCULAR; INTRAVENOUS
Status: DISPENSED
Start: 2020-01-01 | End: 2020-01-01

## 2020-01-01 RX ORDER — ATORVASTATIN CALCIUM 10 MG/1
10 TABLET, FILM COATED ORAL DAILY
Status: DISCONTINUED | OUTPATIENT
Start: 2020-01-01 | End: 2020-01-01

## 2020-01-01 RX ORDER — DEXTROSE MONOHYDRATE 25 G/50ML
12.5 INJECTION, SOLUTION INTRAVENOUS PRN
Status: DISCONTINUED | OUTPATIENT
Start: 2020-01-01 | End: 2020-01-01

## 2020-01-01 RX ORDER — IPRATROPIUM BROMIDE AND ALBUTEROL SULFATE 2.5; .5 MG/3ML; MG/3ML
1 SOLUTION RESPIRATORY (INHALATION)
Status: DISCONTINUED | OUTPATIENT
Start: 2020-01-01 | End: 2020-01-01

## 2020-01-01 RX ORDER — 0.9 % SODIUM CHLORIDE 0.9 %
20 INTRAVENOUS SOLUTION INTRAVENOUS ONCE
Status: COMPLETED | OUTPATIENT
Start: 2020-01-01 | End: 2020-01-01

## 2020-01-01 RX ORDER — NICOTINE POLACRILEX 4 MG
15 LOZENGE BUCCAL PRN
Status: DISCONTINUED | OUTPATIENT
Start: 2020-01-01 | End: 2020-01-01

## 2020-01-01 RX ORDER — 0.9 % SODIUM CHLORIDE 0.9 %
1000 INTRAVENOUS SOLUTION INTRAVENOUS ONCE
Status: COMPLETED | OUTPATIENT
Start: 2020-01-01 | End: 2020-01-01

## 2020-01-01 RX ORDER — ATORVASTATIN CALCIUM 10 MG/1
10 TABLET, FILM COATED ORAL NIGHTLY
Status: DISCONTINUED | OUTPATIENT
Start: 2020-01-01 | End: 2020-01-01

## 2020-01-01 RX ORDER — DEXTROSE MONOHYDRATE 50 MG/ML
100 INJECTION, SOLUTION INTRAVENOUS PRN
Status: DISCONTINUED | OUTPATIENT
Start: 2020-01-01 | End: 2020-01-01

## 2020-01-01 RX ORDER — PROPOFOL 10 MG/ML
10 INJECTION, EMULSION INTRAVENOUS
Status: DISCONTINUED | OUTPATIENT
Start: 2020-01-01 | End: 2020-01-01

## 2020-01-01 RX ORDER — LIDOCAINE HYDROCHLORIDE 10 MG/ML
5 INJECTION, SOLUTION INFILTRATION; PERINEURAL ONCE
Status: DISCONTINUED | OUTPATIENT
Start: 2020-01-01 | End: 2020-01-01

## 2020-01-01 RX ORDER — SODIUM CHLORIDE 9 MG/ML
INJECTION, SOLUTION INTRAVENOUS CONTINUOUS
Status: DISCONTINUED | OUTPATIENT
Start: 2020-01-01 | End: 2020-01-01

## 2020-01-01 RX ORDER — THIAMINE MONONITRATE (VIT B1) 100 MG
200 TABLET ORAL DAILY
Status: DISCONTINUED | OUTPATIENT
Start: 2020-01-01 | End: 2020-01-01

## 2020-01-01 RX ORDER — PROPOFOL 10 MG/ML
INJECTION, EMULSION INTRAVENOUS
Status: DISPENSED
Start: 2020-01-01 | End: 2020-01-01

## 2020-01-01 RX ORDER — MINERAL OIL AND WHITE PETROLATUM 150; 830 MG/G; MG/G
OINTMENT OPHTHALMIC
Status: DISCONTINUED | OUTPATIENT
Start: 2020-01-01 | End: 2020-01-01

## 2020-01-01 RX ORDER — ALBUTEROL SULFATE 90 UG/1
2 AEROSOL, METERED RESPIRATORY (INHALATION) EVERY 6 HOURS PRN
Status: DISCONTINUED | OUTPATIENT
Start: 2020-01-01 | End: 2020-01-01

## 2020-01-01 RX ORDER — LANOLIN ALCOHOL/MO/W.PET/CERES
6 CREAM (GRAM) TOPICAL NIGHTLY
Status: DISCONTINUED | OUTPATIENT
Start: 2020-01-01 | End: 2020-01-01

## 2020-01-01 RX ORDER — ACETAMINOPHEN 325 MG/1
650 TABLET ORAL EVERY 6 HOURS PRN
Status: DISCONTINUED | OUTPATIENT
Start: 2020-01-01 | End: 2020-01-01

## 2020-01-01 RX ORDER — LEVOFLOXACIN 5 MG/ML
500 INJECTION, SOLUTION INTRAVENOUS EVERY 24 HOURS
Status: DISCONTINUED | OUTPATIENT
Start: 2020-01-01 | End: 2020-01-01

## 2020-01-01 RX ORDER — LEVOFLOXACIN 500 MG/1
500 TABLET, FILM COATED ORAL EVERY 24 HOURS
Status: DISCONTINUED | OUTPATIENT
Start: 2020-01-01 | End: 2020-01-01

## 2020-01-01 RX ORDER — ASCORBIC ACID 500 MG
1000 TABLET ORAL 2 TIMES DAILY
Status: DISCONTINUED | OUTPATIENT
Start: 2020-01-01 | End: 2020-01-01

## 2020-01-01 RX ORDER — FENTANYL CITRATE 50 UG/ML
25 INJECTION, SOLUTION INTRAMUSCULAR; INTRAVENOUS
Status: DISCONTINUED | OUTPATIENT
Start: 2020-01-01 | End: 2020-01-01

## 2020-01-01 RX ORDER — PROPOFOL 10 MG/ML
12 INJECTION, EMULSION INTRAVENOUS CONTINUOUS PRN
Status: DISCONTINUED | OUTPATIENT
Start: 2020-01-01 | End: 2020-01-01

## 2020-01-01 RX ORDER — CHLORHEXIDINE GLUCONATE 0.12 MG/ML
15 RINSE ORAL 2 TIMES DAILY
Status: DISCONTINUED | OUTPATIENT
Start: 2020-01-01 | End: 2020-01-01

## 2020-01-01 RX ORDER — ASPIRIN 81 MG/1
81 TABLET ORAL DAILY
Status: DISCONTINUED | OUTPATIENT
Start: 2020-01-01 | End: 2020-01-01

## 2020-01-01 RX ORDER — LORAZEPAM 2 MG/ML
2 INJECTION INTRAMUSCULAR
Status: DISCONTINUED | OUTPATIENT
Start: 2020-01-01 | End: 2020-01-01 | Stop reason: HOSPADM

## 2020-01-01 RX ORDER — HYDRALAZINE HYDROCHLORIDE 20 MG/ML
10 INJECTION INTRAMUSCULAR; INTRAVENOUS EVERY 6 HOURS PRN
Status: DISCONTINUED | OUTPATIENT
Start: 2020-01-01 | End: 2020-01-01

## 2020-01-01 RX ORDER — DEXAMETHASONE SODIUM PHOSPHATE 10 MG/ML
10 INJECTION, SOLUTION INTRAMUSCULAR; INTRAVENOUS EVERY 24 HOURS
Status: COMPLETED | OUTPATIENT
Start: 2020-01-01 | End: 2020-01-01

## 2020-01-01 RX ORDER — ONDANSETRON 2 MG/ML
4 INJECTION INTRAMUSCULAR; INTRAVENOUS EVERY 6 HOURS PRN
Status: DISCONTINUED | OUTPATIENT
Start: 2020-01-01 | End: 2020-01-01

## 2020-01-01 RX ORDER — MIDAZOLAM HYDROCHLORIDE 1 MG/ML
INJECTION INTRAMUSCULAR; INTRAVENOUS
Status: DISCONTINUED
Start: 2020-01-01 | End: 2020-01-01 | Stop reason: WASHOUT

## 2020-01-01 RX ORDER — POTASSIUM CHLORIDE 7.45 MG/ML
10 INJECTION INTRAVENOUS PRN
Status: DISCONTINUED | OUTPATIENT
Start: 2020-01-01 | End: 2020-01-01

## 2020-01-01 RX ORDER — SODIUM CHLORIDE 0.9 % (FLUSH) 0.9 %
10 SYRINGE (ML) INJECTION EVERY 12 HOURS SCHEDULED
Status: DISCONTINUED | OUTPATIENT
Start: 2020-01-01 | End: 2020-01-01

## 2020-01-01 RX ORDER — 0.9 % SODIUM CHLORIDE 0.9 %
30 INTRAVENOUS SOLUTION INTRAVENOUS PRN
Status: DISCONTINUED | OUTPATIENT
Start: 2020-01-01 | End: 2020-01-01

## 2020-01-01 RX ORDER — LACTOBACILLUS RHAMNOSUS GG 10B CELL
1 CAPSULE ORAL 2 TIMES DAILY WITH MEALS
Status: DISCONTINUED | OUTPATIENT
Start: 2020-01-01 | End: 2020-01-01

## 2020-01-01 RX ORDER — ATROPINE SULFATE 10 MG/ML
2 SOLUTION/ DROPS OPHTHALMIC EVERY 4 HOURS PRN
Status: DISCONTINUED | OUTPATIENT
Start: 2020-01-01 | End: 2020-01-01 | Stop reason: HOSPADM

## 2020-01-01 RX ORDER — EZETIMIBE 10 MG/1
10 TABLET ORAL DAILY
Status: DISCONTINUED | OUTPATIENT
Start: 2020-01-01 | End: 2020-01-01

## 2020-01-01 RX ORDER — MINERAL OIL AND WHITE PETROLATUM 150; 830 MG/G; MG/G
OINTMENT OPHTHALMIC EVERY 4 HOURS PRN
Status: DISCONTINUED | OUTPATIENT
Start: 2020-01-01 | End: 2020-01-01

## 2020-01-01 RX ORDER — MAGNESIUM SULFATE IN WATER 40 MG/ML
2 INJECTION, SOLUTION INTRAVENOUS PRN
Status: DISCONTINUED | OUTPATIENT
Start: 2020-01-01 | End: 2020-01-01

## 2020-01-01 RX ADMIN — WHITE PETROLATUM 57.7 %-MINERAL OIL 31.9 % EYE OINTMENT: at 02:22

## 2020-01-01 RX ADMIN — LORAZEPAM 0.5 MG: 0.5 TABLET ORAL at 11:45

## 2020-01-01 RX ADMIN — SODIUM CHLORIDE, PRESERVATIVE FREE 10 ML: 5 INJECTION INTRAVENOUS at 08:23

## 2020-01-01 RX ADMIN — PROPOFOL 10 MCG/KG/MIN: 10 INJECTION, EMULSION INTRAVENOUS at 16:20

## 2020-01-01 RX ADMIN — Medication 6 MG: at 20:11

## 2020-01-01 RX ADMIN — DEXAMETHASONE SODIUM PHOSPHATE 20 MG: 4 INJECTION, SOLUTION INTRA-ARTICULAR; INTRALESIONAL; INTRAMUSCULAR; INTRAVENOUS; SOFT TISSUE at 11:24

## 2020-01-01 RX ADMIN — PROPOFOL 40 MCG/KG/MIN: 10 INJECTION, EMULSION INTRAVENOUS at 23:37

## 2020-01-01 RX ADMIN — DEXAMETHASONE SODIUM PHOSPHATE 10 MG: 10 INJECTION, SOLUTION INTRAMUSCULAR; INTRAVENOUS at 09:43

## 2020-01-01 RX ADMIN — ZINC SULFATE 220 MG (50 MG) CAPSULE 50 MG: CAPSULE at 09:27

## 2020-01-01 RX ADMIN — LOSARTAN POTASSIUM 75 MG: 25 TABLET, FILM COATED ORAL at 09:27

## 2020-01-01 RX ADMIN — ENOXAPARIN SODIUM 40 MG: 40 INJECTION SUBCUTANEOUS at 20:30

## 2020-01-01 RX ADMIN — Medication 6 MG: at 21:08

## 2020-01-01 RX ADMIN — INSULIN LISPRO 2 UNITS: 100 INJECTION, SOLUTION INTRAVENOUS; SUBCUTANEOUS at 15:59

## 2020-01-01 RX ADMIN — PROPOFOL 40 MCG/KG/MIN: 10 INJECTION, EMULSION INTRAVENOUS at 21:20

## 2020-01-01 RX ADMIN — HEPARIN SODIUM 7500 UNITS: 10000 INJECTION INTRAVENOUS; SUBCUTANEOUS at 05:28

## 2020-01-01 RX ADMIN — SODIUM POLYSTYRENE SULFONATE 15 G: 15 SUSPENSION ORAL; RECTAL at 12:36

## 2020-01-01 RX ADMIN — Medication 200 MCG/HR: at 11:01

## 2020-01-01 RX ADMIN — INSULIN LISPRO 2 UNITS: 100 INJECTION, SOLUTION INTRAVENOUS; SUBCUTANEOUS at 05:27

## 2020-01-01 RX ADMIN — Medication 200 MCG/HR: at 19:58

## 2020-01-01 RX ADMIN — WHITE PETROLATUM 57.7 %-MINERAL OIL 31.9 % EYE OINTMENT: at 06:19

## 2020-01-01 RX ADMIN — SODIUM CHLORIDE 1.5 MCG/KG/MIN: 9 INJECTION, SOLUTION INTRAVENOUS at 02:24

## 2020-01-01 RX ADMIN — LOSARTAN POTASSIUM 75 MG: 25 TABLET, FILM COATED ORAL at 09:35

## 2020-01-01 RX ADMIN — ZINC SULFATE 220 MG (50 MG) CAPSULE 50 MG: CAPSULE at 08:53

## 2020-01-01 RX ADMIN — INSULIN LISPRO 2 UNITS: 100 INJECTION, SOLUTION INTRAVENOUS; SUBCUTANEOUS at 09:02

## 2020-01-01 RX ADMIN — ENOXAPARIN SODIUM 40 MG: 40 INJECTION SUBCUTANEOUS at 21:33

## 2020-01-01 RX ADMIN — PROPOFOL 40 MCG/KG/MIN: 10 INJECTION, EMULSION INTRAVENOUS at 19:50

## 2020-01-01 RX ADMIN — SODIUM CHLORIDE, PRESERVATIVE FREE 10 ML: 5 INJECTION INTRAVENOUS at 19:52

## 2020-01-01 RX ADMIN — Medication 200 MCG/HR: at 08:29

## 2020-01-01 RX ADMIN — ZINC SULFATE 220 MG (50 MG) CAPSULE 50 MG: CAPSULE at 09:35

## 2020-01-01 RX ADMIN — INSULIN LISPRO 4 UNITS: 100 INJECTION, SOLUTION INTRAVENOUS; SUBCUTANEOUS at 16:18

## 2020-01-01 RX ADMIN — ASPIRIN 81 MG: 81 TABLET, FILM COATED ORAL at 09:56

## 2020-01-01 RX ADMIN — INSULIN LISPRO 3 UNITS: 100 INJECTION, SOLUTION INTRAVENOUS; SUBCUTANEOUS at 01:12

## 2020-01-01 RX ADMIN — OXYCODONE HYDROCHLORIDE AND ACETAMINOPHEN 1000 MG: 500 TABLET ORAL at 09:43

## 2020-01-01 RX ADMIN — LORAZEPAM 0.5 MG: 0.5 TABLET ORAL at 17:29

## 2020-01-01 RX ADMIN — Medication 200 MCG/HR: at 02:09

## 2020-01-01 RX ADMIN — ATORVASTATIN CALCIUM 10 MG: 10 TABLET, FILM COATED ORAL at 09:56

## 2020-01-01 RX ADMIN — OXYCODONE HYDROCHLORIDE AND ACETAMINOPHEN 1000 MG: 500 TABLET ORAL at 08:41

## 2020-01-01 RX ADMIN — ENOXAPARIN SODIUM 40 MG: 40 INJECTION SUBCUTANEOUS at 20:11

## 2020-01-01 RX ADMIN — FAMOTIDINE 20 MG: 20 TABLET ORAL at 20:56

## 2020-01-01 RX ADMIN — LOSARTAN POTASSIUM 75 MG: 25 TABLET, FILM COATED ORAL at 08:31

## 2020-01-01 RX ADMIN — Medication 200 MCG/HR: at 22:26

## 2020-01-01 RX ADMIN — ATORVASTATIN CALCIUM 10 MG: 10 TABLET, FILM COATED ORAL at 08:31

## 2020-01-01 RX ADMIN — LORAZEPAM 0.5 MG: 0.5 TABLET ORAL at 20:16

## 2020-01-01 RX ADMIN — MIDAZOLAM 8 MG/HR: 5 INJECTION INTRAMUSCULAR; INTRAVENOUS at 10:39

## 2020-01-01 RX ADMIN — FAMOTIDINE 20 MG: 20 TABLET ORAL at 08:41

## 2020-01-01 RX ADMIN — MIDAZOLAM 1 MG/HR: 5 INJECTION INTRAMUSCULAR; INTRAVENOUS at 16:20

## 2020-01-01 RX ADMIN — Medication 1250 MG: at 21:52

## 2020-01-01 RX ADMIN — MIDAZOLAM 8 MG/HR: 5 INJECTION INTRAMUSCULAR; INTRAVENOUS at 15:05

## 2020-01-01 RX ADMIN — GUAIFENESIN AND DEXTROMETHORPHAN 5 ML: 100; 10 SYRUP ORAL at 17:07

## 2020-01-01 RX ADMIN — Medication 6 MG: at 21:33

## 2020-01-01 RX ADMIN — LORAZEPAM 0.5 MG: 0.5 TABLET ORAL at 18:21

## 2020-01-01 RX ADMIN — Medication 200 MCG/HR: at 07:19

## 2020-01-01 RX ADMIN — ENOXAPARIN SODIUM 40 MG: 40 INJECTION SUBCUTANEOUS at 09:57

## 2020-01-01 RX ADMIN — DEXAMETHASONE SODIUM PHOSPHATE 10 MG: 10 INJECTION, SOLUTION INTRAMUSCULAR; INTRAVENOUS at 08:41

## 2020-01-01 RX ADMIN — PROPOFOL 40 MCG/KG/MIN: 10 INJECTION, EMULSION INTRAVENOUS at 22:05

## 2020-01-01 RX ADMIN — GUAIFENESIN AND DEXTROMETHORPHAN 5 ML: 100; 10 SYRUP ORAL at 16:48

## 2020-01-01 RX ADMIN — Medication 1 CAPSULE: at 09:38

## 2020-01-01 RX ADMIN — INSULIN LISPRO 2 UNITS: 100 INJECTION, SOLUTION INTRAVENOUS; SUBCUTANEOUS at 02:22

## 2020-01-01 RX ADMIN — FAMOTIDINE 20 MG: 20 TABLET ORAL at 21:08

## 2020-01-01 RX ADMIN — Medication 200 MCG/HR: at 14:57

## 2020-01-01 RX ADMIN — SODIUM CHLORIDE, PRESERVATIVE FREE 10 ML: 5 INJECTION INTRAVENOUS at 21:34

## 2020-01-01 RX ADMIN — Medication 200 MCG/HR: at 09:39

## 2020-01-01 RX ADMIN — LEVOFLOXACIN 500 MG: 500 TABLET, FILM COATED ORAL at 12:44

## 2020-01-01 RX ADMIN — INSULIN LISPRO 1 UNITS: 100 INJECTION, SOLUTION INTRAVENOUS; SUBCUTANEOUS at 09:39

## 2020-01-01 RX ADMIN — ASPIRIN 81 MG: 81 TABLET, FILM COATED ORAL at 09:27

## 2020-01-01 RX ADMIN — THIAMINE HCL TAB 100 MG 200 MG: 100 TAB at 09:43

## 2020-01-01 RX ADMIN — CHLORHEXIDINE GLUCONATE 15 ML: 1.2 RINSE ORAL at 19:53

## 2020-01-01 RX ADMIN — OXYCODONE HYDROCHLORIDE AND ACETAMINOPHEN 1000 MG: 500 TABLET ORAL at 20:57

## 2020-01-01 RX ADMIN — Medication 200 MCG/HR: at 02:19

## 2020-01-01 RX ADMIN — SODIUM CHLORIDE, PRESERVATIVE FREE 10 ML: 5 INJECTION INTRAVENOUS at 20:30

## 2020-01-01 RX ADMIN — GUAIFENESIN AND DEXTROMETHORPHAN 5 ML: 100; 10 SYRUP ORAL at 17:29

## 2020-01-01 RX ADMIN — ENOXAPARIN SODIUM 40 MG: 40 INJECTION SUBCUTANEOUS at 09:09

## 2020-01-01 RX ADMIN — ENOXAPARIN SODIUM 40 MG: 40 INJECTION SUBCUTANEOUS at 20:16

## 2020-01-01 RX ADMIN — ENOXAPARIN SODIUM 40 MG: 40 INJECTION SUBCUTANEOUS at 21:44

## 2020-01-01 RX ADMIN — Medication 200 MCG/HR: at 04:47

## 2020-01-01 RX ADMIN — REMDESIVIR 100 MG: 100 INJECTION, POWDER, LYOPHILIZED, FOR SOLUTION INTRAVENOUS at 13:40

## 2020-01-01 RX ADMIN — LORAZEPAM 2 MG: 2 INJECTION INTRAMUSCULAR; INTRAVENOUS at 15:25

## 2020-01-01 RX ADMIN — SODIUM CHLORIDE, PRESERVATIVE FREE 10 ML: 5 INJECTION INTRAVENOUS at 08:03

## 2020-01-01 RX ADMIN — GUAIFENESIN AND DEXTROMETHORPHAN 5 ML: 100; 10 SYRUP ORAL at 05:31

## 2020-01-01 RX ADMIN — CEFEPIME HYDROCHLORIDE 2 G: 2 INJECTION, POWDER, FOR SOLUTION INTRAVENOUS at 06:12

## 2020-01-01 RX ADMIN — ENOXAPARIN SODIUM 40 MG: 40 INJECTION SUBCUTANEOUS at 08:41

## 2020-01-01 RX ADMIN — FAMOTIDINE 20 MG: 10 INJECTION INTRAVENOUS at 20:34

## 2020-01-01 RX ADMIN — WHITE PETROLATUM 57.7 %-MINERAL OIL 31.9 % EYE OINTMENT: at 08:01

## 2020-01-01 RX ADMIN — ENOXAPARIN SODIUM 40 MG: 40 INJECTION SUBCUTANEOUS at 20:56

## 2020-01-01 RX ADMIN — SODIUM CHLORIDE, PRESERVATIVE FREE 10 ML: 5 INJECTION INTRAVENOUS at 20:57

## 2020-01-01 RX ADMIN — DEXAMETHASONE SODIUM PHOSPHATE 20 MG: 4 INJECTION, SOLUTION INTRA-ARTICULAR; INTRALESIONAL; INTRAMUSCULAR; INTRAVENOUS; SOFT TISSUE at 12:44

## 2020-01-01 RX ADMIN — PROPOFOL 30 MCG/KG/MIN: 10 INJECTION, EMULSION INTRAVENOUS at 13:40

## 2020-01-01 RX ADMIN — ENOXAPARIN SODIUM 40 MG: 40 INJECTION SUBCUTANEOUS at 07:59

## 2020-01-01 RX ADMIN — ZINC SULFATE 220 MG (50 MG) CAPSULE 50 MG: CAPSULE at 22:08

## 2020-01-01 RX ADMIN — DEXAMETHASONE SODIUM PHOSPHATE 10 MG: 10 INJECTION, SOLUTION INTRAMUSCULAR; INTRAVENOUS at 07:59

## 2020-01-01 RX ADMIN — Medication 175 MCG/HR: at 18:51

## 2020-01-01 RX ADMIN — PROPOFOL 40 MCG/KG/MIN: 10 INJECTION, EMULSION INTRAVENOUS at 07:37

## 2020-01-01 RX ADMIN — SODIUM CHLORIDE, PRESERVATIVE FREE 10 ML: 5 INJECTION INTRAVENOUS at 20:17

## 2020-01-01 RX ADMIN — PROPOFOL 40 MCG/KG/MIN: 10 INJECTION, EMULSION INTRAVENOUS at 16:12

## 2020-01-01 RX ADMIN — Medication 200 MCG/HR: at 13:16

## 2020-01-01 RX ADMIN — HEPARIN SODIUM 7500 UNITS: 10000 INJECTION INTRAVENOUS; SUBCUTANEOUS at 21:53

## 2020-01-01 RX ADMIN — Medication 2000 UNITS: at 09:27

## 2020-01-01 RX ADMIN — SODIUM CHLORIDE, PRESERVATIVE FREE 10 ML: 5 INJECTION INTRAVENOUS at 08:31

## 2020-01-01 RX ADMIN — Medication 200 MCG/HR: at 12:12

## 2020-01-01 RX ADMIN — CHLORHEXIDINE GLUCONATE 15 ML: 1.2 RINSE ORAL at 08:00

## 2020-01-01 RX ADMIN — Medication 200 MCG/HR: at 20:30

## 2020-01-01 RX ADMIN — Medication 200 MCG/HR: at 04:48

## 2020-01-01 RX ADMIN — Medication 1 CAPSULE: at 16:48

## 2020-01-01 RX ADMIN — OXYCODONE HYDROCHLORIDE AND ACETAMINOPHEN 1000 MG: 500 TABLET ORAL at 12:49

## 2020-01-01 RX ADMIN — WHITE PETROLATUM 57.7 %-MINERAL OIL 31.9 % EYE OINTMENT: at 20:30

## 2020-01-01 RX ADMIN — Medication 200 MCG/HR: at 07:25

## 2020-01-01 RX ADMIN — ENOXAPARIN SODIUM 40 MG: 40 INJECTION SUBCUTANEOUS at 21:08

## 2020-01-01 RX ADMIN — SODIUM CHLORIDE, PRESERVATIVE FREE 10 ML: 5 INJECTION INTRAVENOUS at 21:46

## 2020-01-01 RX ADMIN — SODIUM CHLORIDE, PRESERVATIVE FREE 10 ML: 5 INJECTION INTRAVENOUS at 21:09

## 2020-01-01 RX ADMIN — Medication 200 MCG/HR: at 01:27

## 2020-01-01 RX ADMIN — SODIUM CHLORIDE, PRESERVATIVE FREE 10 ML: 5 INJECTION INTRAVENOUS at 08:00

## 2020-01-01 RX ADMIN — FAMOTIDINE 20 MG: 20 TABLET ORAL at 09:43

## 2020-01-01 RX ADMIN — OXYCODONE HYDROCHLORIDE AND ACETAMINOPHEN 1000 MG: 500 TABLET ORAL at 12:43

## 2020-01-01 RX ADMIN — CHLORHEXIDINE GLUCONATE 15 ML: 1.2 RINSE ORAL at 22:11

## 2020-01-01 RX ADMIN — Medication 2 PUFF: at 11:38

## 2020-01-01 RX ADMIN — Medication 20 MG: at 12:48

## 2020-01-01 RX ADMIN — THIAMINE HCL TAB 100 MG 200 MG: 100 TAB at 12:48

## 2020-01-01 RX ADMIN — OXYCODONE HYDROCHLORIDE AND ACETAMINOPHEN 1000 MG: 500 TABLET ORAL at 20:16

## 2020-01-01 RX ADMIN — Medication 200 MCG/HR: at 17:21

## 2020-01-01 RX ADMIN — WHITE PETROLATUM 57.7 %-MINERAL OIL 31.9 % EYE OINTMENT: at 12:49

## 2020-01-01 RX ADMIN — Medication 6 MG: at 22:08

## 2020-01-01 RX ADMIN — WHITE PETROLATUM 57.7 %-MINERAL OIL 31.9 % EYE OINTMENT: at 04:24

## 2020-01-01 RX ADMIN — HYDRALAZINE HYDROCHLORIDE 10 MG: 20 INJECTION INTRAMUSCULAR; INTRAVENOUS at 15:27

## 2020-01-01 RX ADMIN — THIAMINE HCL TAB 100 MG 200 MG: 100 TAB at 09:27

## 2020-01-01 RX ADMIN — CEFEPIME HYDROCHLORIDE 2 G: 2 INJECTION, POWDER, FOR SOLUTION INTRAVENOUS at 18:09

## 2020-01-01 RX ADMIN — ENOXAPARIN SODIUM 40 MG: 40 INJECTION SUBCUTANEOUS at 20:51

## 2020-01-01 RX ADMIN — GUAIFENESIN AND DEXTROMETHORPHAN 5 ML: 100; 10 SYRUP ORAL at 12:01

## 2020-01-01 RX ADMIN — ENOXAPARIN SODIUM 40 MG: 40 INJECTION SUBCUTANEOUS at 09:27

## 2020-01-01 RX ADMIN — CHLORHEXIDINE GLUCONATE 15 ML: 1.2 RINSE ORAL at 07:52

## 2020-01-01 RX ADMIN — THIAMINE HCL TAB 100 MG 200 MG: 100 TAB at 08:53

## 2020-01-01 RX ADMIN — DEXAMETHASONE SODIUM PHOSPHATE 20 MG: 4 INJECTION, SOLUTION INTRA-ARTICULAR; INTRALESIONAL; INTRAMUSCULAR; INTRAVENOUS; SOFT TISSUE at 11:14

## 2020-01-01 RX ADMIN — Medication 6 MG: at 20:57

## 2020-01-01 RX ADMIN — LORAZEPAM 0.5 MG: 0.5 TABLET ORAL at 12:00

## 2020-01-01 RX ADMIN — INSULIN LISPRO 4 UNITS: 100 INJECTION, SOLUTION INTRAVENOUS; SUBCUTANEOUS at 16:12

## 2020-01-01 RX ADMIN — PROPOFOL 40 MCG/KG/MIN: 10 INJECTION, EMULSION INTRAVENOUS at 06:50

## 2020-01-01 RX ADMIN — SODIUM CHLORIDE: 9 INJECTION, SOLUTION INTRAVENOUS at 12:34

## 2020-01-01 RX ADMIN — SODIUM CHLORIDE, PRESERVATIVE FREE 10 ML: 5 INJECTION INTRAVENOUS at 09:58

## 2020-01-01 RX ADMIN — SODIUM CHLORIDE 1000 ML: 9 INJECTION, SOLUTION INTRAVENOUS at 08:12

## 2020-01-01 RX ADMIN — Medication 200 MCG/HR: at 12:11

## 2020-01-01 RX ADMIN — WHITE PETROLATUM 57.7 %-MINERAL OIL 31.9 % EYE OINTMENT: at 16:16

## 2020-01-01 RX ADMIN — OXYCODONE HYDROCHLORIDE AND ACETAMINOPHEN 1000 MG: 500 TABLET ORAL at 09:27

## 2020-01-01 RX ADMIN — FAMOTIDINE 20 MG: 20 TABLET ORAL at 22:09

## 2020-01-01 RX ADMIN — REMDESIVIR 100 MG: 100 INJECTION, POWDER, LYOPHILIZED, FOR SOLUTION INTRAVENOUS at 14:58

## 2020-01-01 RX ADMIN — OXYCODONE HYDROCHLORIDE AND ACETAMINOPHEN 1000 MG: 500 TABLET ORAL at 09:35

## 2020-01-01 RX ADMIN — METOPROLOL TARTRATE 5 MG: 5 INJECTION INTRAVENOUS at 11:45

## 2020-01-01 RX ADMIN — CEFEPIME HYDROCHLORIDE 2 G: 2 INJECTION, POWDER, FOR SOLUTION INTRAVENOUS at 23:29

## 2020-01-01 RX ADMIN — Medication 20 MG: at 20:11

## 2020-01-01 RX ADMIN — PROPOFOL 40 MCG/KG/MIN: 10 INJECTION, EMULSION INTRAVENOUS at 16:47

## 2020-01-01 RX ADMIN — Medication 200 MCG/HR: at 15:59

## 2020-01-01 RX ADMIN — WHITE PETROLATUM 57.7 %-MINERAL OIL 31.9 % EYE OINTMENT: at 19:52

## 2020-01-01 RX ADMIN — Medication 200 MCG/HR: at 19:56

## 2020-01-01 RX ADMIN — MIDAZOLAM 8 MG/HR: 5 INJECTION INTRAMUSCULAR; INTRAVENOUS at 14:10

## 2020-01-01 RX ADMIN — DEXAMETHASONE SODIUM PHOSPHATE 10 MG: 10 INJECTION, SOLUTION INTRAMUSCULAR; INTRAVENOUS at 12:41

## 2020-01-01 RX ADMIN — Medication 1 CAPSULE: at 09:57

## 2020-01-01 RX ADMIN — DEXAMETHASONE SODIUM PHOSPHATE 20 MG: 4 INJECTION, SOLUTION INTRA-ARTICULAR; INTRALESIONAL; INTRAMUSCULAR; INTRAVENOUS; SOFT TISSUE at 16:33

## 2020-01-01 RX ADMIN — SODIUM CHLORIDE, PRESERVATIVE FREE 10 ML: 5 INJECTION INTRAVENOUS at 22:07

## 2020-01-01 RX ADMIN — REMDESIVIR 100 MG: 100 INJECTION, POWDER, LYOPHILIZED, FOR SOLUTION INTRAVENOUS at 15:29

## 2020-01-01 RX ADMIN — CEFEPIME HYDROCHLORIDE 2 G: 2 INJECTION, POWDER, FOR SOLUTION INTRAVENOUS at 06:00

## 2020-01-01 RX ADMIN — EZETIMIBE 10 MG: 10 TABLET ORAL at 09:27

## 2020-01-01 RX ADMIN — FAMOTIDINE 20 MG: 10 INJECTION INTRAVENOUS at 07:36

## 2020-01-01 RX ADMIN — OXYCODONE HYDROCHLORIDE AND ACETAMINOPHEN 1000 MG: 500 TABLET ORAL at 21:08

## 2020-01-01 RX ADMIN — CEFEPIME HYDROCHLORIDE 2 G: 2 INJECTION, POWDER, FOR SOLUTION INTRAVENOUS at 17:58

## 2020-01-01 RX ADMIN — ZINC SULFATE 220 MG (50 MG) CAPSULE 50 MG: CAPSULE at 20:56

## 2020-01-01 RX ADMIN — LORAZEPAM 0.5 MG: 0.5 TABLET ORAL at 06:33

## 2020-01-01 RX ADMIN — OXYCODONE HYDROCHLORIDE AND ACETAMINOPHEN 1000 MG: 500 TABLET ORAL at 08:23

## 2020-01-01 RX ADMIN — PROPOFOL 40 MCG/KG/MIN: 10 INJECTION, EMULSION INTRAVENOUS at 13:08

## 2020-01-01 RX ADMIN — Medication 1 CAPSULE: at 17:06

## 2020-01-01 RX ADMIN — Medication 20 MG: at 21:34

## 2020-01-01 RX ADMIN — Medication 1 CAPSULE: at 15:29

## 2020-01-01 RX ADMIN — PROPOFOL 20 MCG/KG/MIN: 10 INJECTION, EMULSION INTRAVENOUS at 06:09

## 2020-01-01 RX ADMIN — ENOXAPARIN SODIUM 40 MG: 40 INJECTION SUBCUTANEOUS at 08:30

## 2020-01-01 RX ADMIN — DEXAMETHASONE SODIUM PHOSPHATE 20 MG: 4 INJECTION, SOLUTION INTRA-ARTICULAR; INTRALESIONAL; INTRAMUSCULAR; INTRAVENOUS; SOFT TISSUE at 12:00

## 2020-01-01 RX ADMIN — WHITE PETROLATUM 57.7 %-MINERAL OIL 31.9 % EYE OINTMENT: at 11:34

## 2020-01-01 RX ADMIN — Medication 200 MCG/HR: at 06:08

## 2020-01-01 RX ADMIN — POLYETHYLENE GLYCOL 3350 17 G: 17 POWDER, FOR SOLUTION ORAL at 11:27

## 2020-01-01 RX ADMIN — Medication 2000 UNITS: at 09:43

## 2020-01-01 RX ADMIN — MORPHINE SULFATE 4 MG: 4 INJECTION, SOLUTION INTRAMUSCULAR; INTRAVENOUS at 15:25

## 2020-01-01 RX ADMIN — Medication 200 MCG/HR: at 08:33

## 2020-01-01 RX ADMIN — SODIUM CHLORIDE 20 ML: 9 INJECTION, SOLUTION INTRAVENOUS at 18:34

## 2020-01-01 RX ADMIN — REMDESIVIR 200 MG: 100 INJECTION, POWDER, LYOPHILIZED, FOR SOLUTION INTRAVENOUS at 14:08

## 2020-01-01 RX ADMIN — Medication 2 PUFF: at 11:18

## 2020-01-01 RX ADMIN — INSULIN LISPRO 2 UNITS: 100 INJECTION, SOLUTION INTRAVENOUS; SUBCUTANEOUS at 22:11

## 2020-01-01 RX ADMIN — PROPOFOL 40 MCG/KG/MIN: 10 INJECTION, EMULSION INTRAVENOUS at 01:35

## 2020-01-01 RX ADMIN — INSULIN LISPRO 2 UNITS: 100 INJECTION, SOLUTION INTRAVENOUS; SUBCUTANEOUS at 11:33

## 2020-01-01 RX ADMIN — ENOXAPARIN SODIUM 40 MG: 40 INJECTION SUBCUTANEOUS at 22:40

## 2020-01-01 RX ADMIN — DEXAMETHASONE SODIUM PHOSPHATE 10 MG: 10 INJECTION, SOLUTION INTRAMUSCULAR; INTRAVENOUS at 07:36

## 2020-01-01 RX ADMIN — ACETAMINOPHEN 650 MG: 325 TABLET ORAL at 20:45

## 2020-01-01 RX ADMIN — FAMOTIDINE 20 MG: 20 TABLET ORAL at 08:23

## 2020-01-01 RX ADMIN — MIDAZOLAM 8 MG/HR: 5 INJECTION INTRAMUSCULAR; INTRAVENOUS at 20:06

## 2020-01-01 RX ADMIN — FAMOTIDINE 20 MG: 10 INJECTION INTRAVENOUS at 09:09

## 2020-01-01 RX ADMIN — INSULIN LISPRO 2 UNITS: 100 INJECTION, SOLUTION INTRAVENOUS; SUBCUTANEOUS at 21:52

## 2020-01-01 RX ADMIN — MIDAZOLAM 8 MG/HR: 5 INJECTION INTRAMUSCULAR; INTRAVENOUS at 03:47

## 2020-01-01 RX ADMIN — INSULIN LISPRO 2 UNITS: 100 INJECTION, SOLUTION INTRAVENOUS; SUBCUTANEOUS at 00:30

## 2020-01-01 RX ADMIN — PROPOFOL 20 MCG/KG/MIN: 10 INJECTION, EMULSION INTRAVENOUS at 23:25

## 2020-01-01 RX ADMIN — REMDESIVIR 100 MG: 100 INJECTION, POWDER, LYOPHILIZED, FOR SOLUTION INTRAVENOUS at 17:30

## 2020-01-01 RX ADMIN — INSULIN LISPRO 2 UNITS: 100 INJECTION, SOLUTION INTRAVENOUS; SUBCUTANEOUS at 06:09

## 2020-01-01 RX ADMIN — CEFEPIME HYDROCHLORIDE 2 G: 2 INJECTION, POWDER, FOR SOLUTION INTRAVENOUS at 06:09

## 2020-01-01 RX ADMIN — INSULIN LISPRO 2 UNITS: 100 INJECTION, SOLUTION INTRAVENOUS; SUBCUTANEOUS at 20:40

## 2020-01-01 RX ADMIN — IPRATROPIUM BROMIDE AND ALBUTEROL SULFATE 1 AMPULE: .5; 3 SOLUTION RESPIRATORY (INHALATION) at 08:06

## 2020-01-01 RX ADMIN — SODIUM CHLORIDE, PRESERVATIVE FREE 10 ML: 5 INJECTION INTRAVENOUS at 09:26

## 2020-01-01 RX ADMIN — ZINC SULFATE 220 MG (50 MG) CAPSULE 50 MG: CAPSULE at 21:08

## 2020-01-01 RX ADMIN — SODIUM CHLORIDE, PRESERVATIVE FREE 10 ML: 5 INJECTION INTRAVENOUS at 20:12

## 2020-01-01 RX ADMIN — WHITE PETROLATUM 57.7 %-MINERAL OIL 31.9 % EYE OINTMENT: at 16:08

## 2020-01-01 RX ADMIN — PROPOFOL 40 MCG/KG/MIN: 10 INJECTION, EMULSION INTRAVENOUS at 10:10

## 2020-01-01 RX ADMIN — LEVOFLOXACIN 500 MG: 5 INJECTION, SOLUTION INTRAVENOUS at 12:43

## 2020-01-01 RX ADMIN — LORAZEPAM 0.5 MG: 0.5 TABLET ORAL at 08:41

## 2020-01-01 RX ADMIN — IPRATROPIUM BROMIDE AND ALBUTEROL SULFATE 1 AMPULE: .5; 3 SOLUTION RESPIRATORY (INHALATION) at 15:51

## 2020-01-01 RX ADMIN — ENOXAPARIN SODIUM 40 MG: 40 INJECTION SUBCUTANEOUS at 22:08

## 2020-01-01 RX ADMIN — ZINC SULFATE 220 MG (50 MG) CAPSULE 50 MG: CAPSULE at 09:43

## 2020-01-01 RX ADMIN — ENOXAPARIN SODIUM 40 MG: 40 INJECTION SUBCUTANEOUS at 07:36

## 2020-01-01 RX ADMIN — CHLORHEXIDINE GLUCONATE 15 ML: 1.2 RINSE ORAL at 20:29

## 2020-01-01 RX ADMIN — WHITE PETROLATUM 57.7 %-MINERAL OIL 31.9 % EYE OINTMENT: at 20:35

## 2020-01-01 RX ADMIN — Medication 2000 UNITS: at 08:53

## 2020-01-01 RX ADMIN — LORAZEPAM 0.5 MG: 0.5 TABLET ORAL at 00:41

## 2020-01-01 RX ADMIN — SODIUM CHLORIDE 2 MCG/KG/MIN: 9 INJECTION, SOLUTION INTRAVENOUS at 20:39

## 2020-01-01 RX ADMIN — ASPIRIN 81 MG: 81 TABLET, FILM COATED ORAL at 16:22

## 2020-01-01 RX ADMIN — Medication 2000 UNITS: at 08:23

## 2020-01-01 RX ADMIN — INSULIN LISPRO 2 UNITS: 100 INJECTION, SOLUTION INTRAVENOUS; SUBCUTANEOUS at 12:48

## 2020-01-01 RX ADMIN — PROPOFOL 40 MCG/KG/MIN: 10 INJECTION, EMULSION INTRAVENOUS at 11:32

## 2020-01-01 RX ADMIN — ENOXAPARIN SODIUM 40 MG: 40 INJECTION SUBCUTANEOUS at 09:43

## 2020-01-01 RX ADMIN — ATORVASTATIN CALCIUM 10 MG: 10 TABLET, FILM COATED ORAL at 09:27

## 2020-01-01 RX ADMIN — Medication 200 MCG/HR: at 03:50

## 2020-01-01 RX ADMIN — Medication 1 CAPSULE: at 09:27

## 2020-01-01 RX ADMIN — DEXAMETHASONE SODIUM PHOSPHATE 10 MG: 10 INJECTION, SOLUTION INTRAMUSCULAR; INTRAVENOUS at 09:09

## 2020-01-01 RX ADMIN — PROPOFOL 40 MCG/KG/MIN: 10 INJECTION, EMULSION INTRAVENOUS at 03:39

## 2020-01-01 RX ADMIN — Medication 200 MCG/HR: at 23:37

## 2020-01-01 RX ADMIN — SODIUM CHLORIDE, PRESERVATIVE FREE 10 ML: 5 INJECTION INTRAVENOUS at 08:42

## 2020-01-01 RX ADMIN — Medication 100 MCG/HR: at 16:20

## 2020-01-01 RX ADMIN — ASPIRIN 81 MG: 81 TABLET, FILM COATED ORAL at 09:35

## 2020-01-01 RX ADMIN — ZINC SULFATE 220 MG (50 MG) CAPSULE 50 MG: CAPSULE at 21:33

## 2020-01-01 RX ADMIN — INSULIN LISPRO 2 UNITS: 100 INJECTION, SOLUTION INTRAVENOUS; SUBCUTANEOUS at 12:49

## 2020-01-01 RX ADMIN — OXYCODONE HYDROCHLORIDE AND ACETAMINOPHEN 1000 MG: 500 TABLET ORAL at 08:53

## 2020-01-01 RX ADMIN — Medication 2000 UNITS: at 09:35

## 2020-01-01 RX ADMIN — FAMOTIDINE 20 MG: 20 TABLET ORAL at 21:33

## 2020-01-01 RX ADMIN — DOCUSATE SODIUM 100 MG: 50 LIQUID ORAL at 11:27

## 2020-01-01 RX ADMIN — Medication 6 MG: at 20:16

## 2020-01-01 RX ADMIN — SODIUM CHLORIDE, PRESERVATIVE FREE 10 ML: 5 INJECTION INTRAVENOUS at 09:44

## 2020-01-01 RX ADMIN — Medication 200 MCG/HR: at 03:47

## 2020-01-01 RX ADMIN — FAMOTIDINE 20 MG: 10 INJECTION INTRAVENOUS at 20:30

## 2020-01-01 RX ADMIN — WHITE PETROLATUM 57.7 %-MINERAL OIL 31.9 % EYE OINTMENT: at 09:08

## 2020-01-01 RX ADMIN — ENOXAPARIN SODIUM 40 MG: 40 INJECTION SUBCUTANEOUS at 08:52

## 2020-01-01 RX ADMIN — CEFEPIME HYDROCHLORIDE 2 G: 2 INJECTION, POWDER, FOR SOLUTION INTRAVENOUS at 06:19

## 2020-01-01 RX ADMIN — ENOXAPARIN SODIUM 40 MG: 40 INJECTION SUBCUTANEOUS at 19:53

## 2020-01-01 RX ADMIN — MIDAZOLAM 8 MG/HR: 5 INJECTION INTRAMUSCULAR; INTRAVENOUS at 01:34

## 2020-01-01 RX ADMIN — LOSARTAN POTASSIUM 75 MG: 25 TABLET, FILM COATED ORAL at 16:22

## 2020-01-01 RX ADMIN — WHITE PETROLATUM 57.7 %-MINERAL OIL 31.9 % EYE OINTMENT: at 00:17

## 2020-01-01 RX ADMIN — PROPOFOL 40 MCG/KG/MIN: 10 INJECTION, EMULSION INTRAVENOUS at 12:13

## 2020-01-01 RX ADMIN — CHLORHEXIDINE GLUCONATE 15 ML: 1.2 RINSE ORAL at 09:08

## 2020-01-01 RX ADMIN — ENOXAPARIN SODIUM 40 MG: 40 INJECTION SUBCUTANEOUS at 08:23

## 2020-01-01 RX ADMIN — Medication 20 MG: at 09:35

## 2020-01-01 RX ADMIN — Medication 200 MCG/HR: at 22:45

## 2020-01-01 RX ADMIN — FAMOTIDINE 20 MG: 10 INJECTION INTRAVENOUS at 19:53

## 2020-01-01 RX ADMIN — PROPOFOL 120 MG: 10 INJECTION, EMULSION INTRAVENOUS at 17:58

## 2020-01-01 RX ADMIN — ZINC SULFATE 220 MG (50 MG) CAPSULE 50 MG: CAPSULE at 20:11

## 2020-01-01 RX ADMIN — DEXAMETHASONE SODIUM PHOSPHATE 20 MG: 4 INJECTION, SOLUTION INTRA-ARTICULAR; INTRALESIONAL; INTRAMUSCULAR; INTRAVENOUS; SOFT TISSUE at 11:40

## 2020-01-01 RX ADMIN — FAMOTIDINE 20 MG: 10 INJECTION INTRAVENOUS at 08:03

## 2020-01-01 RX ADMIN — SODIUM CHLORIDE, PRESERVATIVE FREE 10 ML: 5 INJECTION INTRAVENOUS at 07:45

## 2020-01-01 RX ADMIN — GUAIFENESIN AND DEXTROMETHORPHAN 5 ML: 100; 10 SYRUP ORAL at 09:27

## 2020-01-01 RX ADMIN — SODIUM CHLORIDE, PRESERVATIVE FREE 10 ML: 5 INJECTION INTRAVENOUS at 22:41

## 2020-01-01 RX ADMIN — PROPOFOL 40 MCG/KG/MIN: 10 INJECTION, EMULSION INTRAVENOUS at 12:05

## 2020-01-01 RX ADMIN — SODIUM CHLORIDE: 9 INJECTION, SOLUTION INTRAVENOUS at 09:50

## 2020-01-01 RX ADMIN — PROPOFOL 40 MCG/KG/MIN: 10 INJECTION, EMULSION INTRAVENOUS at 17:58

## 2020-01-01 RX ADMIN — MIDAZOLAM 8 MG/HR: 5 INJECTION INTRAMUSCULAR; INTRAVENOUS at 06:34

## 2020-01-01 RX ADMIN — OXYCODONE HYDROCHLORIDE AND ACETAMINOPHEN 1000 MG: 500 TABLET ORAL at 21:33

## 2020-01-01 RX ADMIN — FAMOTIDINE 20 MG: 20 TABLET ORAL at 08:53

## 2020-01-01 RX ADMIN — SODIUM CHLORIDE 1.5 MCG/KG/MIN: 9 INJECTION, SOLUTION INTRAVENOUS at 06:08

## 2020-01-01 RX ADMIN — IPRATROPIUM BROMIDE AND ALBUTEROL SULFATE 1 AMPULE: .5; 3 SOLUTION RESPIRATORY (INHALATION) at 11:45

## 2020-01-01 RX ADMIN — INSULIN LISPRO 2 UNITS: 100 INJECTION, SOLUTION INTRAVENOUS; SUBCUTANEOUS at 00:59

## 2020-01-01 RX ADMIN — ENOXAPARIN SODIUM 40 MG: 40 INJECTION SUBCUTANEOUS at 09:36

## 2020-01-01 RX ADMIN — Medication 200 MCG/HR: at 09:49

## 2020-01-01 RX ADMIN — PROPOFOL 40 MCG/KG/MIN: 10 INJECTION, EMULSION INTRAVENOUS at 15:58

## 2020-01-01 RX ADMIN — Medication 2000 UNITS: at 12:49

## 2020-01-01 RX ADMIN — EZETIMIBE 10 MG: 10 TABLET ORAL at 09:57

## 2020-01-01 RX ADMIN — Medication 20 MG: at 09:27

## 2020-01-01 RX ADMIN — PROPOFOL 40 MCG/KG/MIN: 10 INJECTION, EMULSION INTRAVENOUS at 08:41

## 2020-01-01 RX ADMIN — THIAMINE HCL TAB 100 MG 200 MG: 100 TAB at 08:23

## 2020-01-01 RX ADMIN — INSULIN LISPRO 2 UNITS: 100 INJECTION, SOLUTION INTRAVENOUS; SUBCUTANEOUS at 08:10

## 2020-01-01 RX ADMIN — OXYCODONE HYDROCHLORIDE AND ACETAMINOPHEN 1000 MG: 500 TABLET ORAL at 20:11

## 2020-01-01 RX ADMIN — SODIUM CHLORIDE, PRESERVATIVE FREE 10 ML: 5 INJECTION INTRAVENOUS at 09:36

## 2020-01-01 RX ADMIN — Medication 200 MCG/HR: at 06:25

## 2020-01-01 RX ADMIN — LOSARTAN POTASSIUM 75 MG: 25 TABLET, FILM COATED ORAL at 09:58

## 2020-01-01 RX ADMIN — IPRATROPIUM BROMIDE AND ALBUTEROL SULFATE 1 AMPULE: .5; 3 SOLUTION RESPIRATORY (INHALATION) at 20:53

## 2020-01-01 RX ADMIN — ATORVASTATIN CALCIUM 10 MG: 10 TABLET, FILM COATED ORAL at 09:35

## 2020-01-01 RX ADMIN — GUAIFENESIN AND DEXTROMETHORPHAN 5 ML: 100; 10 SYRUP ORAL at 11:00

## 2020-01-01 RX ADMIN — FAMOTIDINE 20 MG: 10 INJECTION INTRAVENOUS at 21:45

## 2020-01-01 RX ADMIN — EZETIMIBE 10 MG: 10 TABLET ORAL at 09:35

## 2020-01-01 RX ADMIN — Medication 200 MCG/HR: at 01:23

## 2020-01-01 RX ADMIN — GUAIFENESIN AND DEXTROMETHORPHAN 5 ML: 100; 10 SYRUP ORAL at 21:35

## 2020-01-01 RX ADMIN — Medication 200 MCG/HR: at 11:04

## 2020-01-01 RX ADMIN — SODIUM CHLORIDE, PRESERVATIVE FREE 10 ML: 5 INJECTION INTRAVENOUS at 20:35

## 2020-01-01 RX ADMIN — Medication 200 MCG/HR: at 13:41

## 2020-01-01 RX ADMIN — OXYCODONE HYDROCHLORIDE AND ACETAMINOPHEN 1000 MG: 500 TABLET ORAL at 22:10

## 2020-01-01 RX ADMIN — FAMOTIDINE 20 MG: 10 INJECTION INTRAVENOUS at 07:59

## 2020-01-01 RX ADMIN — ACETAMINOPHEN 650 MG: 325 TABLET ORAL at 22:45

## 2020-01-01 RX ADMIN — Medication 200 MCG/HR: at 18:31

## 2020-01-01 RX ADMIN — THIAMINE HCL TAB 100 MG 200 MG: 100 TAB at 08:41

## 2020-01-01 RX ADMIN — Medication 2000 UNITS: at 08:41

## 2020-01-01 RX ADMIN — ZINC SULFATE 220 MG (50 MG) CAPSULE 50 MG: CAPSULE at 08:23

## 2020-01-01 RX ADMIN — GUAIFENESIN AND DEXTROMETHORPHAN 5 ML: 100; 10 SYRUP ORAL at 04:37

## 2020-01-01 RX ADMIN — CEFEPIME HYDROCHLORIDE 2 G: 2 INJECTION, POWDER, FOR SOLUTION INTRAVENOUS at 17:27

## 2020-01-01 RX ADMIN — INSULIN LISPRO 2 UNITS: 100 INJECTION, SOLUTION INTRAVENOUS; SUBCUTANEOUS at 06:08

## 2020-01-01 RX ADMIN — ZINC SULFATE 220 MG (50 MG) CAPSULE 50 MG: CAPSULE at 08:41

## 2020-01-01 RX ADMIN — Medication 200 MCG/HR: at 11:30

## 2020-01-01 RX ADMIN — INSULIN LISPRO 4 UNITS: 100 INJECTION, SOLUTION INTRAVENOUS; SUBCUTANEOUS at 19:52

## 2020-01-01 RX ADMIN — ZINC SULFATE 220 MG (50 MG) CAPSULE 50 MG: CAPSULE at 12:49

## 2020-01-01 RX ADMIN — ASPIRIN 81 MG: 81 TABLET, FILM COATED ORAL at 08:31

## 2020-01-01 RX ADMIN — Medication 200 MCG/HR: at 14:49

## 2020-01-01 RX ADMIN — WHITE PETROLATUM 57.7 %-MINERAL OIL 31.9 % EYE OINTMENT: at 01:00

## 2020-01-01 RX ADMIN — LEVOFLOXACIN 500 MG: 500 TABLET, FILM COATED ORAL at 12:48

## 2020-01-01 RX ADMIN — CHLORHEXIDINE GLUCONATE 15 ML: 1.2 RINSE ORAL at 08:03

## 2020-01-01 RX ADMIN — EZETIMIBE 10 MG: 10 TABLET ORAL at 08:31

## 2020-01-01 RX ADMIN — Medication 200 MCG/HR: at 17:32

## 2020-01-01 RX ADMIN — CHLORHEXIDINE GLUCONATE 15 ML: 1.2 RINSE ORAL at 21:44

## 2020-01-01 RX ADMIN — ZINC SULFATE 220 MG (50 MG) CAPSULE 50 MG: CAPSULE at 20:16

## 2020-01-01 ASSESSMENT — PULMONARY FUNCTION TESTS
PIF_VALUE: 32
PIF_VALUE: 32
PIF_VALUE: 33
PIF_VALUE: 32
PIF_VALUE: 40
PIF_VALUE: 31
PIF_VALUE: 32
PIF_VALUE: 33
PIF_VALUE: 37
PIF_VALUE: 31
PIF_VALUE: 32
PIF_VALUE: 32
PIF_VALUE: 35
PIF_VALUE: 32
PIF_VALUE: 31
PIF_VALUE: 33
PIF_VALUE: 32
PIF_VALUE: 31
PIF_VALUE: 40
PIF_VALUE: 32
PIF_VALUE: 39
PIF_VALUE: 31
PIF_VALUE: 32
PIF_VALUE: 33
PIF_VALUE: 32
PIF_VALUE: 32
PIF_VALUE: 33
PIF_VALUE: 31
PIF_VALUE: 32
PIF_VALUE: 31
PIF_VALUE: 31
PIF_VALUE: 32
PIF_VALUE: 32
PIF_VALUE: 33
PIF_VALUE: 31
PIF_VALUE: 31
PIF_VALUE: 33
PIF_VALUE: 34
PIF_VALUE: 31
PIF_VALUE: 38
PIF_VALUE: 31
PIF_VALUE: 32
PIF_VALUE: 31
PIF_VALUE: 40
PIF_VALUE: 33
PIF_VALUE: 32
PIF_VALUE: 33
PIF_VALUE: 32
PIF_VALUE: 33
PIF_VALUE: 32
PIF_VALUE: 33
PIF_VALUE: 32
PIF_VALUE: 34
PIF_VALUE: 33
PIF_VALUE: 32
PIF_VALUE: 33
PIF_VALUE: 31
PIF_VALUE: 31
PIF_VALUE: 32
PIF_VALUE: 39
PIF_VALUE: 39
PIF_VALUE: 38
PIF_VALUE: 33
PIF_VALUE: 32
PIF_VALUE: 33
PIF_VALUE: 32
PIF_VALUE: 39
PIF_VALUE: 39
PIF_VALUE: 33
PIF_VALUE: 32
PIF_VALUE: 33
PIF_VALUE: 34
PIF_VALUE: 31
PIF_VALUE: 40
PIF_VALUE: 39
PIF_VALUE: 32
PIF_VALUE: 30
PIF_VALUE: 32
PIF_VALUE: 32
PIF_VALUE: 38
PIF_VALUE: 33
PIF_VALUE: 31
PIF_VALUE: 33
PIF_VALUE: 32
PIF_VALUE: 29
PIF_VALUE: 32
PIF_VALUE: 32
PIF_VALUE: 40
PIF_VALUE: 32
PIF_VALUE: 28
PIF_VALUE: 33
PIF_VALUE: 32
PIF_VALUE: 33
PIF_VALUE: 32
PIF_VALUE: 39
PIF_VALUE: 33
PIF_VALUE: 32
PIF_VALUE: 40
PIF_VALUE: 32
PIF_VALUE: 33
PIF_VALUE: 31
PIF_VALUE: 31
PIF_VALUE: 32
PIF_VALUE: 32
PIF_VALUE: 36
PIF_VALUE: 32
PIF_VALUE: 33
PIF_VALUE: 32
PIF_VALUE: 31
PIF_VALUE: 37
PIF_VALUE: 32
PIF_VALUE: 32
PIF_VALUE: 33
PIF_VALUE: 31
PIF_VALUE: 41
PIF_VALUE: 32
PIF_VALUE: 34
PIF_VALUE: 32
PIF_VALUE: 30
PIF_VALUE: 39
PIF_VALUE: 32
PIF_VALUE: 32
PIF_VALUE: 31
PIF_VALUE: 32
PIF_VALUE: 31
PIF_VALUE: 31
PIF_VALUE: 32
PIF_VALUE: 39
PIF_VALUE: 34
PIF_VALUE: 36
PIF_VALUE: 31
PIF_VALUE: 33
PIF_VALUE: 32
PIF_VALUE: 31
PIF_VALUE: 34
PIF_VALUE: 39
PIF_VALUE: 33
PIF_VALUE: 32
PIF_VALUE: 34
PIF_VALUE: 32
PIF_VALUE: 32
PIF_VALUE: 33
PIF_VALUE: 32
PIF_VALUE: 31
PIF_VALUE: 39
PIF_VALUE: 4
PIF_VALUE: 32
PIF_VALUE: 31
PIF_VALUE: 33
PIF_VALUE: 32
PIF_VALUE: 39
PIF_VALUE: 31
PIF_VALUE: 32
PIF_VALUE: 32
PIF_VALUE: 31
PIF_VALUE: 38
PIF_VALUE: 31
PIF_VALUE: 30
PIF_VALUE: 32
PIF_VALUE: 39
PIF_VALUE: 32
PIF_VALUE: 32
PIF_VALUE: 38
PIF_VALUE: 32
PIF_VALUE: 37
PIF_VALUE: 32
PIF_VALUE: 40
PIF_VALUE: 31
PIF_VALUE: 32
PIF_VALUE: 34
PIF_VALUE: 32
PIF_VALUE: 32
PIF_VALUE: 39
PIF_VALUE: 29
PIF_VALUE: 33
PIF_VALUE: 32
PIF_VALUE: 32
PIF_VALUE: 31
PIF_VALUE: 32
PIF_VALUE: 32
PIF_VALUE: 39
PIF_VALUE: 33
PIF_VALUE: 32
PIF_VALUE: 30
PIF_VALUE: 32
PIF_VALUE: 31
PIF_VALUE: 33
PIF_VALUE: 32
PIF_VALUE: 31
PIF_VALUE: 32
PIF_VALUE: 33
PIF_VALUE: 38
PIF_VALUE: 31
PIF_VALUE: 31
PIF_VALUE: 38
PIF_VALUE: 33
PIF_VALUE: 31
PIF_VALUE: 33
PIF_VALUE: 33
PIF_VALUE: 31
PIF_VALUE: 41
PIF_VALUE: 32
PIF_VALUE: 39
PIF_VALUE: 39
PIF_VALUE: 31
PIF_VALUE: 31
PIF_VALUE: 32
PIF_VALUE: 33
PIF_VALUE: 33
PIF_VALUE: 32
PIF_VALUE: 39
PIF_VALUE: 32
PIF_VALUE: 39
PIF_VALUE: 40
PIF_VALUE: 31
PIF_VALUE: 32
PIF_VALUE: 31
PIF_VALUE: 32
PIF_VALUE: 31
PIF_VALUE: 34
PIF_VALUE: 32
PIF_VALUE: 32
PIF_VALUE: 31
PIF_VALUE: 32
PIF_VALUE: 32
PIF_VALUE: 37
PIF_VALUE: 31
PIF_VALUE: 30
PIF_VALUE: 32
PIF_VALUE: 32
PIF_VALUE: 31
PIF_VALUE: 32
PIF_VALUE: 31
PIF_VALUE: 32
PIF_VALUE: 40
PIF_VALUE: 33
PIF_VALUE: 33
PIF_VALUE: 31
PIF_VALUE: 32
PIF_VALUE: 30
PIF_VALUE: 33
PIF_VALUE: 31
PIF_VALUE: 29
PIF_VALUE: 32
PIF_VALUE: 31
PIF_VALUE: 39
PIF_VALUE: 32
PIF_VALUE: 32
PIF_VALUE: 30
PIF_VALUE: 41
PIF_VALUE: 33
PIF_VALUE: 32
PIF_VALUE: 33
PIF_VALUE: 34
PIF_VALUE: 33
PIF_VALUE: 32
PIF_VALUE: 31
PIF_VALUE: 32
PIF_VALUE: 32
PIF_VALUE: 33
PIF_VALUE: 32
PIF_VALUE: 32
PIF_VALUE: 30
PIF_VALUE: 33

## 2020-01-01 ASSESSMENT — PAIN SCALES - GENERAL
PAINLEVEL_OUTOF10: 0
PAINLEVEL_OUTOF10: 3
PAINLEVEL_OUTOF10: 0

## 2020-01-01 ASSESSMENT — ENCOUNTER SYMPTOMS
SHORTNESS OF BREATH: 1
COUGH: 1
COLOR CHANGE: 0
ABDOMINAL PAIN: 0
VOMITING: 0
BACK PAIN: 0
WHEEZING: 1

## 2020-11-28 PROBLEM — U07.1 COVID-19: Status: ACTIVE | Noted: 2020-01-01

## 2020-11-28 NOTE — ED NOTES
Spoke with pt's DIL Sammy Oliveira with pt's permission to update on condition. Jorgito Newman gave her phone number as [phone] and asked to be updated when the pt gets a room upstairs and she can let the rest of the family know.       [address] [facility] Jose Miguel, RN  11/28/20 2529

## 2020-11-28 NOTE — H&P
Hospital Medicine History & Physical      PCP: VENKAT Vera - PATTI    Date of Admission: 2020    Chief Complaint: Generalized tiredness weakness      History Of Present Illness:  Patient is a 40-year-old female with past medical history of hypertension, hyperlipidemia anxiety who presents to the hospital for generalized weakness, tiredness. According to the patient she tested positive for COVID-19 2 weeks ago and since then she did not come out of her room however she did not improve as well she mentions she feels generalized weak tired fatigue. Patient also endorses she had several friends who also tested positive. Patient dropped her oxygen and was a started on 4 L nasal cannula. Decision was made to admit to hospital for a steroid therapy. Past Medical History:          Diagnosis Date    History of bronchitis     Hyperlipidemia     Hypertension     PONV (postoperative nausea and vomiting)        Past Surgical History:          Procedure Laterality Date     SECTION      CHOLECYSTECTOMY      DILATION AND CURETTAGE OF UTERUS N/A 2019    HYSTEROSCOPY DILATION AND CURETTAGE performed by Lashanda Garcia MD at [address] [place] [address] 2019    ROBOTIC HYSTERECTOMY, BILATERAL SALPINGO OOPHORECTOMY, SENTINEL LYMPH NODE BIOPSY, LYMPH NODE DISSECTION performed by Amy Jansen MD at [place] [address]       Medications Prior to Admission:      Prior to Admission medications    Medication Sig Start Date End Date Taking? Authorizing Provider   LORazepam (ATIVAN) 0.5 MG tablet Take 0.5 mg by mouth 2 times daily as needed. 19   Historical Provider, MD   losartan (COZAAR) 25 MG tablet Take 75 mg by mouth daily Patient has 50 & 25mg tabs at home.     Historical Provider, MD   aspirin 81 MG EC tablet Take 81 mg by mouth daily     Historical Provider, MD   ezetimibe (ZETIA) 10 MG tablet Take 10 mg by mouth daily     Historical Provider, MD   atorvastatin (LIPITOR) 10 MG tablet Take 10 mg by mouth nightly     Historical Provider, MD       Allergies:  Patient has no known allergies. Social History:      TOBACCO:   reports that she has never smoked. She has never used smokeless tobacco.  ETOH:   reports current alcohol use. Family History:       Reviewed in detail and non contributory          Problem Relation Age of Onset    Heart Attack Mother     Heart Disease Mother     Heart Attack Father     Stroke Father     Early Death Brother     Stroke Brother        REVIEW OF SYSTEMS:   Pertinent positives as noted in the HPI. All other systems reviewed and negative. PHYSICAL EXAM PERFORMED:    BP (!) 145/94   Pulse 83   Temp 98.6 °F (37 °C) (Oral)   Resp 18   Ht 5' 6\" (1.676 m)   Wt 257 lb 0.9 oz (116.6 kg)   LMP  (LMP Unknown)   SpO2 93%   BMI 41.49 kg/m²     General appearance:  No apparent distress, cooperative. HEENT:  Normal cephalic, atraumatic without obvious deformity. Conjunctivae/corneas clear. Neck: Supple, with full range of motion. No cervical lymphadenopathy  Respiratory:  Normal respiratory effort. Clear to auscultation, bilaterally without Rales/Wheezes/Rhonchi. Cardiovascular:  Regular rate and rhythm with normal S1/S2 without murmurs, rubs or gallops. Abdomen: Soft, non-tender, non-distended, normal bowel sounds. Musculoskeletal:  No edema noted bilaterally. No tenderness on palpation   Skin: no rash visible  Neurologic:  Neurologically intact without any focal sensory/motor deficits.   grossly non-focal.  Psychiatric:  Alert and oriented, normal mood  Peripheral Pulses: +2 palpable, equal bilaterally       Labs:     Recent Labs     11/28/20  1253   WBC 8.1   HGB 13.2   HCT 38.8        Recent Labs     11/28/20  1158   *   K 4.3   CL 96*   CO2 25   BUN 14   CREATININE 0.7   CALCIUM 9.0     Recent Labs     11/28/20  1158   *   *   BILITOT 0.6   ALKPHOS 110     No results for input(s): INR in the last 72 hours. Recent Labs     11/28/20  1158   TROPONINI <0.01       Urinalysis:      Lab Results   Component Value Date    NITRU Negative 07/19/2019    BLOODU Negative 07/19/2019    SPECGRAV 1.026 07/19/2019    GLUCOSEU Negative 07/19/2019       Radiology:       XR CHEST PORTABLE   Final Result   Bilateral patchy consolidative opacities suggestive of multifocal infection,   including viral pneumonia. Active Hospital Problems    Diagnosis Date Noted    COVID-19 [U07.1] 11/28/2020       Patient is a 61-year-old female with past medical history of hypertension, hyperlipidemia anxiety who presents to the hospital for generalized weakness, tiredness. According to the patient she tested positive for COVID-19 2 weeks ago and since then she did not come out of her room however she did not improve as well she mentions she feels generalized weak tired fatigue. Patient also endorses she had several friends who also tested positive. Patient dropped her oxygen and was a started on 4 L nasal cannula. Decision was made to admit to hospital for a steroid therapy.     Assessment  Acute hypoxic respiratory failure satting less than 90% on room air secondary to COVID-19  Generalized weakness, tiredness likely secondary to COVID-19 infection  Diagnosed with COVID-19 2 weeks ago per patient  Hyponatremia  Hypertension  Hyperlipidemia  Anxiety      Plan  We will initiate dexamethasone, Lovenox twice daily for DVT prophylaxis consult pulmonary, will defer starting remdesivir to pulmonary as patient mentions she was diagnosed with \"2 weeks ago and self isolated \"or base of acute episode of hypoxia could be treated as new Covid infection given still positive for COVID-19  Resume home aspirin, statin, Ativan, losartan  DVT prophylaxis on Lovenox  Diet: DIET GENERAL;  Code Status: DNR-CCA    PT/OT Eval Status: ordered    Dispo - pending clinical improvement       Jazmin Boss MD    The note was

## 2020-11-28 NOTE — ED NOTES
Spoke with the pt's son Nadine Smith with the pt's consent to inform on the pt's status and that she is going to be admitted.       Isha Roberts RN  11/28/20 7960

## 2020-11-28 NOTE — ACP (ADVANCE CARE PLANNING)
ADVANCED CARE PLANNING    Name:Kristina Hamilton       :  1960              MRN:  [id number]    Purpose of Encounter: Advanced care planning in light of acute and chronic deteriorating medical conditions. Parties in attendance: :Annie Baron MD ( myself )    Decisional Capacity: YES      Subjective: Patient/family understand in this voluntary conversation what inteventions and plans patient would want implemented in light of the following diagnoses:    Diagnosis: COVID19 PNA    Discussion highlights: I discussed with patient advanced directives or impending END of life event in the light of above diagnosis, we discussed the needs for possible CPR, intubation/ventilator support if needed. In such an event patient wishes to remain DNR CCA. Goals of Care Determinations: Patient wishes DNR CCA but has interest in continuing this conversation, and discussing with family before making any changes to code status and goals of care.      Code Status: DNR CCA    Time Spent: 17 minutes    Electronically signed by Reymundo Gonzalez MD on 2020 at 3:59 PM

## 2020-11-28 NOTE — ED NOTES
Pt continues to have intermittent coughing fits that result in her SpO2 dropping to 85%. After the coughing passes SpO2 comes back up to 91%-92% after about 5 minutes with pt on 5 L/min nasal cannula.       Clemencia Cloud RN  11/28/20 8308

## 2020-11-28 NOTE — ED PROVIDER NOTES
behavioral problems and confusion. Except as noted above the remainder of the review of systems was reviewed and negative. PAST MEDICAL HISTORY         Diagnosis Date    History of bronchitis     Hyperlipidemia     Hypertension     PONV (postoperative nausea and vomiting)        SURGICAL HISTORY           Procedure Laterality Date     SECTION      CHOLECYSTECTOMY      DILATION AND CURETTAGE OF UTERUS N/A 2019    HYSTEROSCOPY DILATION AND CURETTAGE performed by Lauren Arthur MD at [address] [place] [address] Bilateral 2019    ROBOTIC HYSTERECTOMY, BILATERAL SALPINGO OOPHORECTOMY, SENTINEL LYMPH NODE BIOPSY, LYMPH NODE DISSECTION performed by Wesly Huffman MD at [address]       Previous Medications    ASPIRIN 81 MG EC TABLET    Take 81 mg by mouth daily     ATORVASTATIN (LIPITOR) 10 MG TABLET    Take 10 mg by mouth nightly     EZETIMIBE (ZETIA) 10 MG TABLET    Take 10 mg by mouth daily     LORAZEPAM (ATIVAN) 0.5 MG TABLET    Take 0.5 mg by mouth 2 times daily as needed. LOSARTAN (COZAAR) 25 MG TABLET    Take 75 mg by mouth daily Patient has 50 & 25mg tabs at home. ALLERGIES     Patient has no known allergies. FAMILY HISTORY           Problem Relation Age of Onset    Heart Attack Mother     Heart Disease Mother     Heart Attack Father     Stroke Father     Early Death Brother     Stroke Brother      Family Status   Relation Name Status    Mother  Alive    Father      Sister 3 Alive    Brother 2 Alive    Brother          SOCIAL HISTORY      reports that she has never smoked. She has never used smokeless tobacco. She reports current alcohol use. She reports that she does not use drugs.     PHYSICAL EXAM    (up to 7 for level 4, 8 or more for level 5)     ED Triage Vitals [20 1017]   BP Temp Temp Source Pulse Resp SpO2 Height Weight   (!) 169/99 98.2 °F (36.8 °C) Temporal 87 17 (!) 88 % 5' 6\" (1.676 m) 257 lb 0.9 oz (116.6 kg)       Physical Exam  Vitals signs and nursing note reviewed. Constitutional:       Appearance: Normal appearance. HENT:      Head: Normocephalic and atraumatic. Eyes:      Pupils: Pupils are equal, round, and reactive to light. Neck:      Musculoskeletal: Normal range of motion. Cardiovascular:      Rate and Rhythm: Normal rate. Pulses: Normal pulses. Pulmonary:      Effort: Pulmonary effort is normal. No respiratory distress. Breath sounds: Wheezing present. Musculoskeletal: Normal range of motion. General: No swelling. Skin:     General: Skin is warm. Neurological:      General: No focal deficit present. Mental Status: She is alert and oriented to person, place, and time. Psychiatric:         Mood and Affect: Mood normal.         Behavior: Behavior normal.         DIAGNOSTIC RESULTS     EKG: All EKG's are interpreted by DONNA Dial in the absence of a cardiologist.    EKG interpreted by myself - please refer to attending physician's note for complete EKG interpretation:    Rhythm: sinus rhythm   No evidence of acute ischemia or injury. RADIOLOGY:   Non-plain film images such as CT, Ultrasound and MRI are read by the radiologist. Plain radiographic images are visualized and preliminarily interpreted by DONNA Dial with the below findings:    Reviewed radiologist's interpretation. Interpretation per the Radiologist below, if available at the time of this note:    XR CHEST PORTABLE   Final Result   Bilateral patchy consolidative opacities suggestive of multifocal infection,   including viral pneumonia.                LABS:  Labs Reviewed   CBC WITH AUTO DIFFERENTIAL - Abnormal; Notable for the following components:       Result Value    RDW 12.3 (*)     Lymphocytes Absolute 0.8 (*)     All other components within normal limits    Narrative:     Performed at:  [facility]  [address] [employer] [address]   Phone [phone]   COMPREHENSIVE METABOLIC PANEL - Abnormal; Notable for the following components:    Sodium 132 (*)     Chloride 96 (*)     Glucose 124 (*)     Albumin/Globulin Ratio 0.9 (*)      (*)      (*)     All other components within normal limits    Narrative:     Performed at:  [facility]  [address] [employer] [address]   Phone [phone]   BRAIN NATRIURETIC PEPTIDE - Abnormal; Notable for the following components:    Pro- (*)     All other components within normal limits    Narrative:     Performed at:  [facility]  [address] [facility] [employer] [address]   Phone [phone] - Abnormal; Notable for the following components:    SARS-CoV-2, NAAT DETECTED (*)     All other components within normal limits    Narrative:     Performed at:  [facility]  [address], [place] [address]   Phone [phone]   FIBRINOGEN - Abnormal; Notable for the following components:    Fibrinogen 792 (*)     All other components within normal limits    Narrative:     Performed at:  [facility]  [address] [facility] [employer] [address]   Phone [phone]   C-REACTIVE PROTEIN - Abnormal; Notable for the following components:    .6 (*)     All other components within normal limits    Narrative:     Performed at:  [facility]  [address], [place] [address]   Phone [phone]   TROPONIN    Narrative:     Performed at:  [facility]  [address] [employer] [address]   Phone [phone]   TYPE AND SCREEN    Narrative:     Performed at:  [facility]  [address] [place] [address] [facility] [employer] [address]   Phone [phone]       All other labs were

## 2020-11-28 NOTE — ED NOTES
Pt experiencing multiple coughing fits. SpO2 at 88% and O2 titrated up to to 5 L/min nasal cannula and SpO2 increased to 92%. Provider notified.      Isha Roberts RN  11/28/20 [address], RN  11/28/20 125

## 2020-11-29 NOTE — PROGRESS NOTES
Order received for BiPAP. On arrival to bedside, patient SpO2 96% on Airvo + NRB mask. NRB mask removed while setting up BiPAP and explaining device to patient. SpO2 94% on Airvo 95/60/34. BiPAP left on standby bedside. RN notified.      11/29/20 0648   Oxygen Therapy/Pulse Ox   O2 Therapy Oxygen humidified   O2 Device Heated high flow cannula   O2 Flow Rate (L/min) 60 L/min   FiO2  95 %   Resp 20   SpO2 94 %   Skin Assessment Clean, dry, & intact   Humidification Source Heated wire   Humidification Temp 34   Humidification Temp Measured 34   Pulse Oximeter Device Mode Continuous   Pulse Oximeter Device Location Finger       Electronically signed by Saranya Nieto RCP on 11/29/2020 at 6:51 AM

## 2020-11-29 NOTE — ED NOTES
Pt remains alert and oriented with no acute distress noted. Report called and given to [facility], pain score reviewed and all questions answered.       [address] [facility] Jose Miguel, RN  11/28/20 2055

## 2020-11-29 NOTE — PROGRESS NOTES
After working with therapy patient is now in the prone position and tolerating it well. Patient's saturation level is 94% on 60L airvo.

## 2020-11-29 NOTE — PROGRESS NOTES
8/1/2019). Restrictions  Restrictions/Precautions  Restrictions/Precautions: Isolation  Position Activity Restriction  Other position/activity restrictions: Droplet Plus Precautions :  COVID +. O2 60L via AirVo. Vision/Hearing  Vision: Within Functional Limits  Hearing: Within functional limits     Subjective  General  Chart Reviewed: Yes  Patient assessed for rehabilitation services?: Yes  Additional Pertinent Hx: 55 y/o female admit 11/28/2020 with Hypoxia, COVID +. PMH as noted including HTN, Anxiety. Family / Caregiver Present: No  Referring Practitioner: Dr. Rollin Schirmer  Diagnosis: Hypoxia, COVID +. Follows Commands: Within Functional Limits  Subjective  Subjective: Pt agreeable to PT Eval/Rx. Pain Screening  Patient Currently in Pain: Denies          Orientation  Orientation  Overall Orientation Status: Within Functional Limits  Social/Functional History  Social/Functional History  Lives With: Son  Type of Home: House  Home Layout: Two level, Able to Live on Main level with bedroom/bathroom, Laundry in basement(1 story with basement.)  Home Access: Stairs to enter without rails(1 step to enter.)  Bathroom Shower/Tub: Tub/Shower unit, Walk-in shower  Bathroom Toilet: Standard  Bathroom Accessibility: Accessible  Home Equipment: (No DME.)  ADL Assistance: Independent  Homemaking Assistance: (Shared with son.)  Ambulation Assistance: Independent(Without assist device pta.)  Transfer Assistance: Independent  Active : Yes  Type of occupation: Works for [employer]. Cognition   Cognition  Overall Cognitive Status: WFL    Objective     Observation/Palpation  Observation: Upon arrival pt lying in bed, O2 60L, visibly somewhat SOB.     AROM RLE (degrees)  RLE AROM: WFL  AROM LLE (degrees)  LLE AROM : WFL  AROM RUE (degrees)  RUE AROM : WFL  AROM LUE (degrees)  LUE AROM : WFL  Strength RLE  Strength RLE: WFL  Strength LLE  Strength LLE: WFL  Strength RUE  Strength RUE: WFL  Strength LUE  Strength LUE:

## 2020-11-29 NOTE — PROGRESS NOTES
Patient has requested to lay prone for the second time today. RN and PCA assisted patient into position and states she is comfortable. Oxygen saturation level is 93% on 60L airvo.

## 2020-11-29 NOTE — PROGRESS NOTES
1708 - Unit 1 of convalescent plasma started. [phone] - Patient is tolerating plasma well. VSS. Patient on 57L airvo. O2 sats 91%.

## 2020-11-29 NOTE — PLAN OF CARE
Notified DONNA Hooker that patient is unable to maintain adequate 02 level without a non-re breather mask and she is requesting to eat. Provider states to try 15  Hi-destiny and if needed order vapotherm.

## 2020-11-29 NOTE — PROGRESS NOTES
Hospitalist Progress Note    CC: <principal problem not specified>      Admit date: 11/28/2020  Days in hospital:  1    Subjective/interval history: Pt S/E. Pt with increasing O2 needs overnight, up to 60 L vapotherm now. She self prones and can maintain so2 in the 90s. She states she doesn't want to go to the icu or been intubated. She states she \"feels ok\". ROS:   Pertinent items are noted in HPI. Objective:    /75   Pulse 76   Temp 98.5 °F (36.9 °C) (Oral)   Resp 20   Ht 5' 5.98\" (1.676 m)   Wt 258 lb 6.1 oz (117.2 kg)   LMP  (LMP Unknown)   SpO2 93%   BMI 41.72 kg/m²     Gen: alert, on vapotherm  HEENT: NC/AT, moist mucous membranes  Neck: supple, trachea midline  Heart: Normal s1/s2, RRR, no murmurs, gallops, or rubs. Lungs: + rales, scattered wheezing, + use of accessory muscles   Abd: bowel sounds present, soft, nontender, nondistended, no masses  Extrem: No clubbing, cyanosis, no edema  Skin: no rashes or lesions  Psych: A & O x3, affect appropriate   Neuro: grossly intact, moves all four extremities spontaneously.  On focal deficits  Cap refill: +2 sec    Medications:  Scheduled Meds:   sodium chloride flush  10 mL Intravenous 2 times per day    aspirin  81 mg Oral Daily    ezetimibe  10 mg Oral Daily    losartan  75 mg Oral Daily    enoxaparin  40 mg Subcutaneous BID    atorvastatin  10 mg Oral Daily       PRN Meds:  albuterol sulfate HFA, sodium chloride flush, potassium chloride, magnesium sulfate, acetaminophen **OR** acetaminophen, magnesium hydroxide, promethazine **OR** ondansetron, LORazepam    IV:        Intake/Output Summary (Last 24 hours) at 11/29/2020 1012  Last data filed at 11/28/2020 1758  Gross per 24 hour   Intake --   Output 550 ml   Net -550 ml       Results:  CBC:   Recent Labs     11/28/20  1253   WBC 8.1   HGB 13.2   HCT 38.8   MCV 92.8        BMP:   Recent Labs     11/28/20  1158   *   K 4.3   CL 96*   CO2 25   BUN 14 CREATININE 0.7     Mag: No results for input(s): MAG in the last 72 hours. Phos:   Lab Results   Component Value Date    PHOS 3.0 08/02/2019     No components found for: GLU    LIVER PROFILE:   Recent Labs     11/28/20  1158   *   *   BILITOT 0.6   ALKPHOS 110     PT/INR: No results for input(s): PROTIME, INR in the last 72 hours. APTT: No results for input(s): APTT in the last 72 hours. UA:No results for input(s): NITRITE, COLORU, PHUR, LABCAST, WBCUA, RBCUA, MUCUS, TRICHOMONAS, YEAST, BACTERIA, CLARITYU, SPECGRAV, LEUKOCYTESUR, UROBILINOGEN, BILIRUBINUR, BLOODU, GLUCOSEU, AMORPHOUS in the last 72 hours. Invalid input(s): [employer] input(s): ABG  Lab Results   Component Value Date    CALCIUM 9.0 11/28/2020    PHOS 3.0 08/02/2019       Assessment:    Active Problems:    COVID-19  Resolved Problems:    * No resolved hospital problems. [facility] course: a 55 yo female admitted with lethargy, sob, tested + for covid 2 weeks ago. She required 4L O2 in the ED. However, since admission overnight she has needed more O2 and is now on vapotherm at 60L O2.      Plan:  Covid pna  - cxr: bl patchy consolidative opacities  - no fevers  Monitor inflammation closely:  - lfts midly elevated, will repeat lfts today and start remdesivir  - fibrinogen 800  - ferritin   - d dimer   - closely monitor for thromboemboli - lovenox bid  - increase decadron to 20 mg daily   - will give convalescent plasma      Acute hypoxic respiratory failure, POA - worsening  - with criteria: < 90% on RA, accessory muscle use, RR> 18, due to covid pna  - supplemental oxygen as necessary to keep SaO2 > 90%, not on O2 at home  - required increasing O2, now on airvo high flow O2 at 95%, 60L/min and self proned  - check procalcitonin for superimposed bacterial pna   - levaquin x 5 days     htn  - cont home meds     Code status: limited, she does not want intubation  DVT prophylaxis: [x] Lovenox  [] SQ Heparin  [] SCDs

## 2020-11-29 NOTE — PROGRESS NOTES
Spoke with patient's DIL and gave her updates on the patient. Informed her on the amount of oxygen that the patient is on and that we are going to be continuously monitoring her oxygen saturation. All questions answered at this time. Will call back with updates if needed.

## 2020-11-29 NOTE — CONSULTS
REASON FOR CONSULTATION/CC: Hypoxia, COVID-19      Consult at request of Sulema Helton DO     PCP: VENKAT Cardenas - CNP    Chief Complaint   Patient presents with    Other     Pt c/o a cough x 7 days and SpO2 88% upon arrival to the dept. HISTORY OF PRESENT ILLNESS: Gurinder Liz is a 56y.o. year old female with a history of morbid obesity who presents with generalized malaise and fatigue. Symptoms started 2 weeks ago she tested positive for COVID-19. Since that time symptoms have progressed. She has not found anything that makes it better or worse. She was found to be hypoxic on arrival.  She is currently on Vapotherm at 60 L. She has rapidly rising oxygen requirement she was only on 4 L on admission yesterday. Has been started on Decadron 20 mg and remdesivir. She is empirically on Levaquin. Past Medical History:   Diagnosis Date    History of bronchitis     Hyperlipidemia     Hypertension     PONV (postoperative nausea and vomiting)          Past Surgical History:   Procedure Laterality Date     SECTION      CHOLECYSTECTOMY      DILATION AND CURETTAGE OF UTERUS N/A 2019    HYSTEROSCOPY DILATION AND CURETTAGE performed by Saurabh Richards MD at [address] [place] [address] Bilateral 2019    ROBOTIC HYSTERECTOMY, BILATERAL SALPINGO OOPHORECTOMY, SENTINEL LYMPH NODE BIOPSY, LYMPH NODE DISSECTION performed by Luciana Jacinto MD at [address] [place] [address]  family history includes Early Death in her brother; Heart Attack in her father and mother; Heart Disease in her mother; Stroke in her brother and father. Social Hx   reports that she has never smoked.  She has never used smokeless tobacco.    Scheduled Meds:   sodium chloride  20 mL Intravenous Once    dexamethasone  20 mg Intravenous Q24H    dexamethasone (PF)        levofloxacin  500 mg Intravenous Q24H    lactobacillus  1 capsule Oral BID WC    [START ON 11/30/2020] remdesivir IVPB  100 mg Intravenous Q24H    sodium chloride flush  10 mL Intravenous 2 times per day    aspirin  81 mg Oral Daily    ezetimibe  10 mg Oral Daily    losartan  75 mg Oral Daily    enoxaparin  40 mg Subcutaneous BID    atorvastatin  10 mg Oral Daily       Continuous Infusions:      PRN Meds:  guaiFENesin-dextromethorphan, sodium chloride, albuterol sulfate HFA, sodium chloride flush, potassium chloride, magnesium sulfate, acetaminophen **OR** acetaminophen, magnesium hydroxide, promethazine **OR** ondansetron, LORazepam    ALLERGIES:  Patient has No Known Allergies. REVIEW OF SYSTEMS:  Constitutional: Negative for fever  HENT: Negative for sore throat  Eyes: Negative for redness   Respiratory: Negative for dyspnea, cough  Cardiovascular: Negative for chest pain  Gastrointestinal: Negative for vomiting, diarrhea   Genitourinary: Negative for hematuria   Musculoskeletal: Negative for arthralgias   Skin: Negative for rash  Neurological: Negative for syncope  Hematological: Negative for adenopathy  Psychiatric/Behavorial: Negative for anxiety    Objective:   PHYSICAL EXAM:  Blood pressure 118/67, pulse 76, temperature 98.4 °F (36.9 °C), temperature source Oral, resp. rate 20, height 5' 5.98\" (1.676 m), weight 258 lb 6.1 oz (117.2 kg), SpO2 92 %, not currently breastfeeding.'  Gen:  No acute distress. Eyes: PERRL. Anicteric sclera. No conjunctival injection. ENT: No discharge. Posterior oropharynx clear. External appearance of ears and nose normal.  Neck: Trachea midline. No mass   Resp:  No crackles. No wheezes. No rhonchi. No dullness on percussion. CV: Regular rate. Regular rhythm. No murmur or rub. No edema. GI: Soft, Non-tender.  + Obese. +BS  Skin: Warm, dry, w/o erythema. Lymph: No cervical or supraclavicular LAD. M/S: No cyanosis. No clubbing. Neuro:  no focal neurologic deficit. Moves all extremities  Psych: Awake and alert, Oriented x 3.   Judgement and insight appropriate. Mood stable. Data Reviewed:   LABS:  CBC:   Recent Labs     11/28/20  1253   WBC 8.1   HGB 13.2   HCT 38.8   MCV 92.8        BMP:   Recent Labs     11/28/20  1158 11/29/20  1255   * 137   K 4.3 4.2   CL 96* 101   CO2 25 21   BUN 14 16   CREATININE 0.7 0.6     LIVER PROFILE:   Recent Labs     11/28/20  1158 11/29/20  1255   * 48*   * 75*   BILITOT 0.6 0.5   ALKPHOS 110 100     PT/INR: No results for input(s): PROTIME, INR in the last 72 hours. APTT: No results for input(s): APTT in the last 72 hours. UA:No results for input(s): NITRITE, COLORU, PHUR, LABCAST, WBCUA, RBCUA, MUCUS, TRICHOMONAS, YEAST, BACTERIA, CLARITYU, SPECGRAV, LEUKOCYTESUR, UROBILINOGEN, BILIRUBINUR, BLOODU, GLUCOSEU, AMORPHOUS in the last 72 hours. Invalid input(s): KETONESU  No results for input(s): PHART, BBD5WKR, PO2ART in the last 72 hours. Vent Information  Skin Assessment: Clean, dry, & intact  FiO2 : 95 %  SpO2: 92 %  SpO2/FiO2 ratio: 96.84  Humidification Source: Heated wire  Humidification Temp: 34  Humidification Temp Measured: 34    Radiology Review:  Pertinent images / reports were reviewed as a part of this visit. CT Chest w/ contrast: No results found for this or any previous visit. CT Chest w/o contrast: No results found for this or any previous visit. CTPA: No results found for this or any previous visit. CXR PA/LAT:   Results for orders placed during the hospital encounter of 07/19/19   XR CHEST STANDARD (2 VW)    Narrative EXAMINATION:  TWO XRAY VIEWS OF THE CHEST    7/19/2019 12:04 pm    COMPARISON:  None. HISTORY:  ORDERING SYSTEM PROVIDED HISTORY: Encounter for preadmission testing  TECHNOLOGIST PROVIDED HISTORY:  Reason for Exam: pre-op chest xray  Acuity: Acute  Type of Exam: Initial  Additional signs and symptoms: surgery scheduled for 8-1-19    FINDINGS:  Frontal and lateral views of the chest are submitted for review.   The cardiac  silhouette is normal Dragon Dictation software. Please disregard any translational errors.     Thank you for the consult    [address]  Pulmonary, Sleep and Critical Care Medicine

## 2020-11-30 NOTE — PROGRESS NOTES
distention. Trachea midline. Respiratory:  Normal respiratory effort. Clear to auscultation, bilaterally without Rales/Wheezes/Rhonchi. Cardiovascular: Regular rate and rhythm with normal S1/S2 without murmurs, rubs or gallops. Abdomen: Soft, non-tender, non-distended with normal bowel sounds. Musculoskeletal: No clubbing, cyanosis or edema bilaterally. Full range of motion without deformity. Skin: Skin color, texture, turgor normal.  No rashes or lesions. Neurologic:  Neurovascularly intact without any focal sensory/motor deficits. Cranial nerves: II-XII intact, grossly non-focal.  Psychiatric: Alert and oriented, thought content appropriate, normal insight  Capillary Refill: Brisk,< 3 seconds   Peripheral Pulses: +2 palpable, equal bilaterally       Labs:   Recent Labs     11/28/20  1253 11/29/20  1538 11/30/20  0750   WBC 8.1 12.7* 13.0*   HGB 13.2 14.0 13.7   HCT 38.8 42.0 40.9    319 327     Recent Labs     11/28/20  1158 11/29/20  1255 11/30/20  0750   * 137 139   K 4.3 4.2 3.9   CL 96* 101 101   CO2 25 21 26   BUN 14 16 22*   CREATININE 0.7 0.6 0.8   CALCIUM 9.0 9.3 9.1     Recent Labs     11/28/20  1158 11/29/20  1255 11/30/20  0750   * 48* 52*   * 75* 71*   BILITOT 0.6 0.5 0.5   ALKPHOS 110 100 100     No results for input(s): INR in the last 72 hours. Recent Labs     11/28/20  1158   TROPONINI <0.01       Urinalysis:      Lab Results   Component Value Date    NITRU Negative 07/19/2019    BLOODU Negative 07/19/2019    SPECGRAV 1.026 07/19/2019    GLUCOSEU Negative 07/19/2019       Radiology:  XR CHEST PORTABLE   Final Result   Bilateral patchy consolidative opacities suggestive of multifocal infection,   including viral pneumonia.                  Assessment/Plan:    COVID-19 pneumonia   chest x-ray with bilateral patchy opacities  Continue Decadron 20 mg daily  Convalescent plasma given  Remdesivir ordered   Lovenox twice daily    Acute respiratory failure with hypoxia  Continue to titrate to keep oxygen saturations above 90%  Now requiring 60 L oxygen  Levaquin for 5 days    Hypertension  Continue home medications    Hyperlipidemia  Continue home statin    DVT Prophylaxis: Lovenox twice daily  Diet: DIET GENERAL;  Code Status: DNR-CCA    PT/OT Eval Status: Not applicable    Dispo -inpatient    I spent greater than 30 minutes of critical care time with patient regards to her life-threatening acute hypoxic respiratory failure requiring 60 L of oxygen    Brianne San MD

## 2020-11-30 NOTE — PROGRESS NOTES
Pulmonary Progress Note    CC: Acute hypoxic respiratory failure  COVID-19 pneumonia  Elevated LFTs  Leukocytosis    24 hr events:  She has a mostly dry cough. She is on OptiFlow. ROS:  No SOB  +cough  No vomiting     PHYSICAL EXAM:  Blood pressure (!) 146/72, pulse 83, temperature 98.6 °F (37 °C), temperature source Oral, resp. rate 24, height 5' 5.98\" (1.676 m), weight 256 lb 2.8 oz (116.2 kg), SpO2 (!) 89 %, not currently breastfeeding. On OptiFlow 60 L, 95%    Intake/Output Summary (Last 24 hours) at 11/30/2020 1434  Last data filed at 11/30/2020 0531  Gross per 24 hour   Intake 130 ml   Output 250 ml   Net -120 ml       Gen: Well developed; well nourished  Eyes: No scleral icterus. No conjunctival injection. ENT: External appearance of ears and nose normal.  Neck: Trachea midline. No obvious mass. No visible thyroid enlargement    Respiratory: Faint crackles bilaterally, no accessory muscle use  Cardiovascular: Regular rate and rhythm, no appreciable murmurs. No edema. Skin: Warm and dry. No rashes or ulcers on visible areas. Normal texture and turgor  Musculoskeletal: No cyanosis, clubbing or joint deformity.     Psychiatric: Normal mood and affect; exhibits normal insight and judgement     Medications:  Current Facility-Administered Medications: levoFLOXacin (LEVAQUIN) tablet 500 mg, 500 mg, Oral, Q24H  famotidine (PEPCID) injection 20 mg, 20 mg, Intravenous, BID  melatonin tablet 6 mg, 6 mg, Oral, Nightly  vitamin B-1 (THIAMINE) tablet 200 mg, 200 mg, Oral, Daily  zinc sulfate (ZINCATE) capsule 50 mg, 50 mg, Oral, BID  vitamin C (ASCORBIC ACID) tablet 1,000 mg, 1,000 mg, Oral, TID  Vitamin D (CHOLECALCIFEROL) tablet 2,000 Units, 2,000 Units, Oral, Daily  dexamethasone (DECADRON) 20 mg in sodium chloride 0.9 % IVPB, 20 mg, Intravenous, Q24H  guaiFENesin-dextromethorphan (ROBITUSSIN DM) 100-10 MG/5ML syrup 5 mL, 5 mL, Oral, Q4H PRN  lactobacillus (CULTURELLE) capsule 1 capsule, 1 capsule, Oral, BID

## 2020-11-30 NOTE — CARE COORDINATION
INITIAL CASE MANAGEMENT ASSESSMENT    Reviewed chart, unable to meet with patient due to isolation status. Called to patient's room, patient did not answer. SW called patient's mother listed on, explained Case Management role/services. Living Situation: Patient lives in a house with her son and daughter-in -law. 0 steps to enter the basement where patient lives. ADLs: Independent prior to admission. DME: No DME prior to admission. PT/OT Recs: Ordered     Mother unable to complete the assessment. SW/CM will follow up with patient to complete assessment and ACP documents. SW/CM will follow and assist as needed.     Electronically signed by DARIN Degroot LSW on 11/30/2020 at 4:23 PM

## 2020-11-30 NOTE — ACP (ADVANCE CARE PLANNING)
ADVANCED CARE PLANNING    Name:Kristina Hamilton       :  1960              MRN:  [id number]      Purpose of Encounter: Advanced care planning in light of covid 19 pna. Parties in attendance: :Beto Fierro, Tara Aguayo DO, Family members:none. Decisional Capacity:Yes    Diagnosis: Active Problems:    COVID-19    Acute respiratory failure with hypoxia (HCC)    Pneumonia due to COVID-19 virus    Elevated LFTs    Leukocytosis  Resolved Problems:    * No resolved hospital problems. *    Patients Medical Story: a 57 yo female admitted with lethargy, sob, tested + for covid 2 weeks ago. She required 4L O2 in the ED. However, since admission overnight she has needed more O2 and is now on vapotherm at 60L O2. She self prones and can maintain so2 in the 90s. She states she doesn't want to go to the icu or to be intubated.     Goals of Care Determinations: Patient wishes to focus on getting better  Plan: Will notify VENKAT Allen CNP of change in care plan. Will look at further interventions as needed. Code Status: At this time patient wishes to be DNR-CCA  Time Spent with Patient: 30 minutes      Electronically signed by Howie Burgess DO on 2020 at 2:52 PM  Thank you VENKAT Allen CNP for the opportunity to be involved in this patient's care.

## 2020-11-30 NOTE — PLAN OF CARE
Problem: Gas Exchange - Impaired  Goal: Absence of hypoxia  Outcome: Ongoing  Goal: Promote optimal lung function  Outcome: Ongoing     Problem:  Body Temperature -  Risk of, Imbalanced  Goal: Ability to maintain a body temperature within defined limits  Outcome: Ongoing  Goal: Will regain or maintain usual level of consciousness  Outcome: Ongoing  Goal: Complications related to the disease process, condition or treatment will be avoided or minimized  Outcome: Ongoing     Problem: Breathing Pattern - Ineffective  Goal: Ability to achieve and maintain a regular respiratory rate  Outcome: Ongoing

## 2020-11-30 NOTE — PROGRESS NOTES
Pt Sp02 was 100% on vapotherm this morning at 0800. Fi02 95 and O2 flow rate 60. Pt was prone at the time. RT turned flow rate to 40 and pt turned over and sat up. Sp02 dropped into the lower 80s. RT turned flow rate back up to 60 and pt returned to prone position. Since then the pt Sp02 has between 94% and 85% with no stability. Pt discussed plan of care with dr Romel Roca. Pt is tearful about prognosis. I provided comforting words and told her to hope for the best but plan for the worst.  Pt settled down and is now asleep. Sp02 is at 91%. Will continue to monitor.

## 2020-11-30 NOTE — ACP (ADVANCE CARE PLANNING)
ADVANCED CARE PLANNING     Name:Kristina Hamilton                         :  1960                                   MRN:  [id number]        Purpose of Encounter: Advanced care planning in light of covid 19 pna. Parties in attendance: :Tara Turner DO, Family members:none. Decisional Capacity:Yes     Diagnosis: Active Problems:    COVID-19    Acute respiratory failure with hypoxia (HCC)    Pneumonia due to COVID-19 virus    Elevated LFTs    Leukocytosis  Resolved Problems:    * No resolved hospital problems. *     Patients Medical Story: a 55 yo female admitted with lethargy, sob, tested + for covid 2 weeks ago. She required 4L O2 in the ED. However, since admission overnight she has needed more O2 and is now on vapotherm at 60L O2. She self prones and can maintain so2 in the 90s. She states she doesn't want to go to the icu or to be intubated.     Goals of Care Determinations: Patient wishes to focus on getting better  Plan: Will notify VENKAT Viveros CNP of change in care plan. Will look at further interventions as needed. Code Status:  At this time patient wishes to be DNR-CCA  Time Spent with Patient: 30 minutes

## 2020-12-01 NOTE — PLAN OF CARE
Problem: Airway Clearance - Ineffective  Goal: Achieve or maintain patent airway  12/1/2020 1049 by Jaqueline Weller RN  Outcome: Ongoing     Problem: Gas Exchange - Impaired  Goal: Absence of hypoxia  12/1/2020 1049 by Jaqueline Weller RN  Outcome: Ongoing     Problem: Gas Exchange - Impaired  Goal: Promote optimal lung function  12/1/2020 1049 by Jaqueline Weller RN  Outcome: Ongoing     Problem: Breathing Pattern - Ineffective  Goal: Ability to achieve and maintain a regular respiratory rate  12/1/2020 1049 by Jaqueline Weller RN  Outcome: Ongoing     Problem:  Body Temperature -  Risk of, Imbalanced  Goal: Ability to maintain a body temperature within defined limits  12/1/2020 1049 by Jaqueline eWller RN  Outcome: Ongoing

## 2020-12-01 NOTE — PROGRESS NOTES
Hospitalist Progress Note      PCP: VENKAT Penn - CNP    Date of Admission: 11/28/2020    Chief Complaint: Shortness of breath    Hospital Course: 55 yo female admitted with lethargy, sob, tested + for covid 2 weeks ago. She required 4L O2 in the ED. However, since admission overnight she has needed more O2 and is now on vapotherm at 60L O2. Subjective: Patient seen and examined. Patient's oxygen demands are remaining somewhat stable. She is still requiring 60 L along with a nonrebreather. Oxygen sats remain above 90%. Currently the patient is prone. Asked once again if the patient would like me to touch base with any family members and she is refusing. Patient also reiterates that she would not like to go to the ICU or be intubated.     Medications:  Reviewed    Infusion Medications   Scheduled Medications    levoFLOXacin  500 mg Oral Q24H    famotidine (PEPCID) injection  20 mg Intravenous BID    melatonin  6 mg Oral Nightly    thiamine  200 mg Oral Daily    zinc sulfate  50 mg Oral BID    vitamin C  1,000 mg Oral TID    Vitamin D  2,000 Units Oral Daily    dexamethasone  20 mg Intravenous Q24H    lactobacillus  1 capsule Oral BID WC    remdesivir IVPB  100 mg Intravenous Q24H    sodium chloride flush  10 mL Intravenous 2 times per day    aspirin  81 mg Oral Daily    ezetimibe  10 mg Oral Daily    losartan  75 mg Oral Daily    enoxaparin  40 mg Subcutaneous BID    atorvastatin  10 mg Oral Daily     PRN Meds: guaiFENesin-dextromethorphan, sodium chloride, albuterol sulfate HFA, sodium chloride flush, potassium chloride, magnesium sulfate, acetaminophen **OR** acetaminophen, magnesium hydroxide, promethazine **OR** ondansetron, LORazepam      Intake/Output Summary (Last 24 hours) at 12/1/2020 0919  Last data filed at 12/1/2020 0441  Gross per 24 hour   Intake 300 ml   Output --   Net 300 ml       Physical Exam Performed:    BP (!) 158/78   Pulse 74   Temp 98.6 °F (37 °C) (Oral)   Resp 24   Ht 5' 5.98\" (1.676 m)   Wt 255 lb 11.7 oz (116 kg)   LMP  (LMP Unknown)   SpO2 92%   BMI 41.30 kg/m²     General appearance: No apparent distress, appears stated age and cooperative. HEENT: Pupils equal, round, and reactive to light. Conjunctivae/corneas clear. Neck: Supple, with full range of motion. No jugular venous distention. Trachea midline. Respiratory:  Normal respiratory effort. Clear to auscultation, bilaterally without Rales/Wheezes/Rhonchi. Cardiovascular: Regular rate and rhythm with normal S1/S2 without murmurs, rubs or gallops. Abdomen: Soft, non-tender, non-distended with normal bowel sounds. Musculoskeletal: No clubbing, cyanosis or edema bilaterally. Full range of motion without deformity. Skin: Skin color, texture, turgor normal.  No rashes or lesions. Neurologic:  Neurovascularly intact without any focal sensory/motor deficits. Cranial nerves: II-XII intact, grossly non-focal.  Psychiatric: Alert and oriented, thought content appropriate, normal insight  Capillary Refill: Brisk,< 3 seconds   Peripheral Pulses: +2 palpable, equal bilaterally       Labs:   Recent Labs     11/28/20  1253 11/29/20  1538 11/30/20  0750   WBC 8.1 12.7* 13.0*   HGB 13.2 14.0 13.7   HCT 38.8 42.0 40.9    319 327     Recent Labs     11/28/20  1158 11/29/20  1255 11/30/20  0750   * 137 139   K 4.3 4.2 3.9   CL 96* 101 101   CO2 25 21 26   BUN 14 16 22*   CREATININE 0.7 0.6 0.8   CALCIUM 9.0 9.3 9.1     Recent Labs     11/28/20  1158 11/29/20  1255 11/30/20  0750   * 48* 52*   * 75* 71*   BILITOT 0.6 0.5 0.5   ALKPHOS 110 100 100     No results for input(s): INR in the last 72 hours.   Recent Labs     11/28/20  1158   TROPONINI <0.01       Urinalysis:      Lab Results   Component Value Date    NITRU Negative 07/19/2019    BLOODU Negative 07/19/2019    SPECGRAV 1.026 07/19/2019    GLUCOSEU Negative 07/19/2019       Radiology:  XR CHEST PORTABLE   Final Result Bilateral patchy consolidative opacities suggestive of multifocal infection,   including viral pneumonia.                  Assessment/Plan:    COVID-19 pneumonia   chest x-ray with bilateral patchy opacities  Continue Decadron 20 mg daily  Convalescent plasma given  Remdesivir ordered   Lovenox twice daily    Acute respiratory failure with hypoxia  Continue to titrate to keep oxygen saturations above 90%  Now requiring 60 L oxygen, additional 15 L on nonrebreather  Levaquin for 5 days    Hypertension  Continue home medications    Hyperlipidemia  Continue home statin    DVT Prophylaxis: Lovenox twice daily  Diet: DIET GENERAL;  Code Status: DNR-CCA    PT/OT Eval Status: Not applicable    Dispo -inpatient        Dominik Fabian MD

## 2020-12-01 NOTE — PROGRESS NOTES
Pulmonary Progress Note    CC: Acute hypoxic respiratory failure  COVID-19 pneumonia  Elevated LFTs  Leukocytosis    24 hr events:  She has had increasing oxygen requirements. Now on maximum settings of OptiFlow as well as a nonrebreather mask. ROS:  No SOB  +cough  No vomiting     PHYSICAL EXAM:  Blood pressure (!) 167/88, pulse 79, temperature 98.6 °F (37 °C), temperature source Axillary, resp. rate 22, height 5' 5.98\" (1.676 m), weight 255 lb 11.7 oz (116 kg), SpO2 93 %, not currently breastfeeding. On OptiFlow 60 L, 95% and NRBM    Intake/Output Summary (Last 24 hours) at 12/1/2020 1707  Last data filed at 12/1/2020 0441  Gross per 24 hour   Intake 300 ml   Output --   Net 300 ml       Gen: Well developed; well nourished  Eyes: No scleral icterus. No conjunctival injection. ENT: External appearance of ears and nose normal.  Neck: Trachea midline. No obvious mass. No visible thyroid enlargement    Respiratory: Clear to auscultation bilaterally, no accessory muscle use  Cardiovascular: Regular rate and rhythm, no appreciable murmurs. No edema. Skin: Warm and dry. No rashes or ulcers on visible areas. Normal texture and turgor  Musculoskeletal: No cyanosis, clubbing or joint deformity.     Psychiatric: Normal mood and affect; exhibits normal insight and judgement     Medications:  Current Facility-Administered Medications: levoFLOXacin (LEVAQUIN) tablet 500 mg, 500 mg, Oral, Q24H  famotidine (PEPCID) injection 20 mg, 20 mg, Intravenous, BID  melatonin tablet 6 mg, 6 mg, Oral, Nightly  vitamin B-1 (THIAMINE) tablet 200 mg, 200 mg, Oral, Daily  zinc sulfate (ZINCATE) capsule 50 mg, 50 mg, Oral, BID  vitamin C (ASCORBIC ACID) tablet 1,000 mg, 1,000 mg, Oral, TID  Vitamin D (CHOLECALCIFEROL) tablet 2,000 Units, 2,000 Units, Oral, Daily  dexamethasone (DECADRON) 20 mg in sodium chloride 0.9 % IVPB, 20 mg, Intravenous, Q24H  guaiFENesin-dextromethorphan (ROBITUSSIN DM) 100-10 MG/5ML syrup 5 mL, 5 mL, Oral, Q4H PRN  lactobacillus (CULTURELLE) capsule 1 capsule, 1 capsule, Oral, BID WC  [COMPLETED] remdesivir 200 mg in sodium chloride 0.9 % 250 mL IVPB, 200 mg, Intravenous, Once **FOLLOWED BY** remdesivir 100 mg in sodium chloride 0.9 % 250 mL IVPB, 100 mg, Intravenous, Q24H  0.9 % sodium chloride bolus, 30 mL, Intravenous, PRN  albuterol sulfate  (90 Base) MCG/ACT inhaler 2 puff, 2 puff, Inhalation, Q6H PRN  sodium chloride flush 0.9 % injection 10 mL, 10 mL, Intravenous, 2 times per day  sodium chloride flush 0.9 % injection 10 mL, 10 mL, Intravenous, PRN  potassium chloride 10 mEq/100 mL IVPB (Peripheral Line), 10 mEq, Intravenous, PRN  magnesium sulfate 2 g in 50 mL IVPB premix, 2 g, Intravenous, PRN  acetaminophen (TYLENOL) tablet 650 mg, 650 mg, Oral, Q6H PRN **OR** acetaminophen (TYLENOL) suppository 650 mg, 650 mg, Rectal, Q6H PRN  magnesium hydroxide (MILK OF MAGNESIA) 400 MG/5ML suspension 30 mL, 30 mL, Oral, Daily PRN  promethazine (PHENERGAN) tablet 12.5 mg, 12.5 mg, Oral, Q6H PRN **OR** ondansetron (ZOFRAN) injection 4 mg, 4 mg, Intravenous, Q6H PRN  aspirin EC tablet 81 mg, 81 mg, Oral, Daily  ezetimibe (ZETIA) tablet 10 mg, 10 mg, Oral, Daily  LORazepam (ATIVAN) tablet 0.5 mg, 0.5 mg, Oral, BID PRN  losartan (COZAAR) tablet 75 mg, 75 mg, Oral, Daily  enoxaparin (LOVENOX) injection 40 mg, 40 mg, Subcutaneous, BID  atorvastatin (LIPITOR) tablet 10 mg, 10 mg, Oral, Daily    Data reviewed:  Labs:  CBC:   Recent Labs     11/29/20  1538 11/30/20  0750 12/01/20  0948   WBC 12.7* 13.0* 13.7*   HGB 14.0 13.7 14.2   HCT 42.0 40.9 41.3   MCV 93.3 92.8 91.6    327 379     BMP:   Recent Labs     11/29/20  1255 11/30/20  0750 12/01/20  0948    139 142   K 4.2 3.9 4.1    101 104   CO2 21 26 25   BUN 16 22* 24*   CREATININE 0.6 0.8 0.6     LIVER PROFILE:   Recent Labs     11/29/20  1255 11/30/20  0750 12/01/20  0948   AST 48* 52* 34   ALT 75* 71* 57*   BILITOT 0.5 0.5 0.6   ALKPHOS 100 100 103 PT/INR: No results for input(s): PROTIME, INR in the last 72 hours. APTT: No results for input(s): APTT in the last 72 hours. Films:  Chest images and reports were reviewed by me. My interpretation is:  No new images    Assessment:     Acute hypoxic respiratory failure  COVID-19 pneumonia  Elevated LFTs  Leukocytosis      Plan:    Acute hypoxic respiratory failure  -Due to COVID-19  -On OptiFlow and nonrebreather mask. Wean as able to keep oxygen saturation greater than 90%    COVID-19 pneumonia  -Decadron 20 mg daily (day #3)  -Remdesivir day #3  -Status post convalescent plasma on 11/29  -Check CRP and ferritin  -Repeat chest x-ray in a.m. Elevated LFTs  -Likely due to COVID-19. Check LFTs in a.m. Leukocytosis  -Likely due to steroids. Monitor      Patient is at high risk given the presence of respiratory failure requiring the use of OptiFlow. Thank you for allowing me to participate in the care of this patient. Will follow.      Lesvia Melvin MD  [facility] Pulmonary, Critical Care and Sleep

## 2020-12-01 NOTE — FLOWSHEET NOTE
Resting well in prone position. Assessment unchanged from previous. Continues to desaturate with very little movement or activity. Continue to monitor. Fauzia Molina RN

## 2020-12-01 NOTE — ACP (ADVANCE CARE PLANNING)
Attempted to contact patient via telephone due to isolation status. Patient did not answer multiple attempts by SW. SW/CM will follow up with patient to have ACP discussion and complete documentation. Abrahan DARIN Steele, [place], Social Work  [phone]  Electronically signed by DARIN Bhatt LSW on 12/2/2020 at 4:28 PM    SW called to patient's room to complete ACP documents. Patient did not answer at this time. Patient currently on 60L of oxygen. AbrahanDARIN Fuentes, [place], Social Work  [phone]  Electronically signed by DARIN Bhatt LSW on 12/3/2020 at 12:26 PM    SW called to patient's room due to isolation status to complete ACP discussion. Patient did not answer at this time. Patient currently on 60L of oxygen. SW will request RN complete ACP discussion with patient. DARIN Bhatt, [place], Social Work  [phone]  Electronically signed by DARIN Bhatt LSW on 12/4/2020 at 10:28 AM    Advance Care Planning     Advance Care Planning Activator (Inpatient)  Conversation Note      Date of ACP Conversation: 12/4/2020    Conversation Conducted with: Patient with Decision Making Capacity    ACP Activator: C/ Monika 29 Decision Maker:     Current Designated Health Care Decision Maker:   Primary Decision Maker: Jomar Guidry - [phone]    Primary Decision Maker: Raymon Hernandez - Child - [phone]    Care Preferences - Patient's RN spoke with her regarding the following questions. Ventilation: \"If you were in your present state of health and suddenly became very ill and were unable to breathe on your own, what would your preference be about the use of a ventilator (breathing machine) if it were available to you? \"      Would the patient desire the use of ventilator (breathing machine)?: yes    \"If your health worsens and it becomes clear that your chance of recovery is unlikely, what would your preference be about the use of a ventilator (breathing machine) if it were available to you? \"     Would the patient desire the use of ventilator (breathing machine)?: Yes      Resuscitation  \"CPR works best to restart the heart when there is a sudden event, like a heart attack, in someone who is otherwise healthy. Unfortunately, CPR does not typically restart the heart for people who have serious health conditions or who are very sick. \"    \"In the event your heart stopped as a result of an underlying serious health condition, would you want attempts to be made to restart your heart (answer \"yes\" for attempt to resuscitate) or would you prefer a natural death (answer \"no\" for do not attempt to resuscitate)? \" yes     [] Yes   [x] No   Educated Patient / oGlden Damon regarding differences between Advance Directives and portable DNR orders.     Length of ACP Conversation in minutes:  15    Conversation Outcomes:  [x] ACP discussion completed  [] Existing advance directive reviewed with patient; no changes to patient's previously recorded wishes  [] New Advance Directive completed  [] Portable Do Not Rescitate prepared for Provider review and signature  [] POLST/POST/MOLST/MOST prepared for Provider review and signature      Follow-up plan:    [] Schedule follow-up conversation to continue planning  [] Referred individual to Provider for additional questions/concerns   [] Advised patient/agent/surrogate to review completed ACP document and update if needed with changes in condition, patient preferences or care setting    [x] This note routed to one or more involved healthcare providers       DARIN Mccormick, [place], Social Work  [phone]  Electronically signed by DARIN Mccormick, NATASHA on 12/4/2020 at 3:21 PM

## 2020-12-01 NOTE — PLAN OF CARE
Problem: Body Temperature -  Risk of, Imbalanced  Goal: Ability to maintain a body temperature within defined limits  12/1/2020 0025 by Misael Haines RN  Outcome: Met This Shift     Problem: Body Temperature -  Risk of, Imbalanced  Goal: Will regain or maintain usual level of consciousness  12/1/2020 0025 by Misael Haines RN  Outcome: Met This Shift     Problem: Risk for Fluid Volume Deficit  Goal: Maintain normal heart rhythm  12/1/2020 0025 by Misael Haines RN  Outcome: Met This Shift     Problem: Airway Clearance - Ineffective  Goal: Achieve or maintain patent airway  12/1/2020 0025 by Misael Haines RN  Outcome: Ongoing  Note: Continues to have periods of dyspnea. Is on 70/ liter minute heated oxygen. Has been able to prone throughout day and tonight. Problem: Body Temperature -  Risk of, Imbalanced  Goal: Complications related to the disease process, condition or treatment will be avoided or minimized  12/1/2020 0025 by Misael Haines RN  Outcome: Ongoing     Problem: Isolation Precautions - Risk of Spread of Infection  Goal: Prevent transmission of infection  12/1/2020 0025 by Misael Haines RN  Outcome: Ongoing  Note: Remains in droplet plus isolation.

## 2020-12-01 NOTE — PROGRESS NOTES
Occupational Therapy Attempt Note    Attempted to see patient for Occupational Therapy evaluation. Per chart review and discussion with patient's RN. Patient currently unable to actively participate in an active progressive therapeutic rehab program as patient is currently experiencing  [x] Dyspnea at rest  [] Patient in distress  [x] Signs of increased respiratory failure which include vitals at rest trending [] RR: <8 or >24/minute  [] SpO2% <90%  [] FiO2 >60%[] Sedated and on mechanical ventilation     [x] Discontinue Occupational Therapy orders.  Please re-refer when patient medically appropriate to participate in an active progressive therapeutic rehab program.

## 2020-12-02 NOTE — PLAN OF CARE
Problem: Airway Clearance - Ineffective  Goal: Achieve or maintain patent airway  12/2/2020 1023 by Cornelius Cardenas RN  Outcome: Ongoing     Problem: Gas Exchange - Impaired  Goal: Absence of hypoxia  12/2/2020 1023 by Cornelius Cardenas RN  Outcome: Ongoing     Problem: Gas Exchange - Impaired  Goal: Promote optimal lung function  12/2/2020 1023 by Cornelius Cardenas RN  Outcome: Ongoing     Problem: Breathing Pattern - Ineffective  Goal: Ability to achieve and maintain a regular respiratory rate  12/2/2020 1023 by Cornelius Cardenas RN  Outcome: Ongoing     Problem: Body Temperature -  Risk of, Imbalanced  Goal: Ability to maintain a body temperature within defined limits  12/2/2020 1023 by Cornelius Cardenas RN  Outcome: Ongoing     Problem: Body Temperature -  Risk of, Imbalanced  Goal: Will regain or maintain usual level of consciousness  12/2/2020 1023 by Cornelius Cardenas RN  Outcome: Ongoing     Problem:  Body Temperature -  Risk of, Imbalanced  Goal: Complications related to the disease process, condition or treatment will be avoided or minimized  12/2/2020 1023 by Cornelius Cardenas RN  Outcome: Ongoing     Problem: Isolation Precautions - Risk of Spread of Infection  Goal: Prevent transmission of infection  12/2/2020 1023 by Cornelius Cardenas RN  Outcome: Ongoing     Problem: Nutrition Deficits  Goal: Optimize nutrtional status  12/2/2020 1023 by Cornelius Cardenas RN  Outcome: Ongoing

## 2020-12-02 NOTE — PROGRESS NOTES
Hospitalist Progress Note      PCP: Nelia Castillo, APRN - CNP    Date of Admission: 11/28/2020    Chief Complaint: Shortness of breath    Hospital Course: 55 yo female admitted with lethargy, sob, tested + for covid 2 weeks ago. She required 4L O2 in the ED. However, since admission overnight she has needed more O2 and is now on vapotherm at 60L O2.     12/1 Patient's oxygen demands are remaining somewhat stable. She is still requiring 60 L along with a nonrebreather. Oxygen sats remain above 90%. Currently the patient is prone. Asked once again if the patient would like me to touch base with any family members and she is refusing. Patient also reiterates that she would not like to go to the ICU or be intubated. Subjective: Patient seen and examined. Still requiring 60L along with NRB. Oxygen dropping with minimal exertion. Discussed once again talking with family and she refused. Stated her DIL is handling everything.      Medications:  Reviewed    Infusion Medications   Scheduled Medications    levoFLOXacin  500 mg Oral Q24H    famotidine (PEPCID) injection  20 mg Intravenous BID    melatonin  6 mg Oral Nightly    thiamine  200 mg Oral Daily    zinc sulfate  50 mg Oral BID    vitamin C  1,000 mg Oral TID    Vitamin D  2,000 Units Oral Daily    dexamethasone  20 mg Intravenous Q24H    lactobacillus  1 capsule Oral BID WC    remdesivir IVPB  100 mg Intravenous Q24H    sodium chloride flush  10 mL Intravenous 2 times per day    aspirin  81 mg Oral Daily    ezetimibe  10 mg Oral Daily    losartan  75 mg Oral Daily    enoxaparin  40 mg Subcutaneous BID    atorvastatin  10 mg Oral Daily     PRN Meds: guaiFENesin-dextromethorphan, sodium chloride, albuterol sulfate HFA, sodium chloride flush, potassium chloride, magnesium sulfate, acetaminophen **OR** acetaminophen, magnesium hydroxide, promethazine **OR** ondansetron, LORazepam      Intake/Output Summary (Last 24 hours) at 12/2/2020 SOAP Progress Note


Assessment/Plan: 


Assessment:


wound ok/ uo ok/ afebrile/ abd soft/ vs stable























Plan:advance diet





06/04/17 10:22





06/05/17 22:33


SEEN EARLIER THIS AM/ WOUND OK/ AFEBRILE/ CO BLOATING/ ABD SOFT, MILDLY 

DISTENDED WITH BS/ STOMA PINK


PROBABLE CONSTIPATION/  CHECK 2-WAY/ CATHARSIS


06/08/17 18:54





WOUND OKAY/OSTOMY OKAY/ AFEBRILE/ URINE OUTPUT GREAT /      VERY SLOW TO 

MOBILIZE / HOPEFULLY HOME THIS WEEKEND


06/10/17 16:40


 wound okay/afebrile/eating well / still complains of incisional pain but is 

incision looks great / ostomy okay /urine output good / home soon


Objective: 





 Vital Signs











Temp Pulse Resp BP Pulse Ox


 


 36.8 C   99   18   113/77   94 


 


 06/10/17 16:00  06/10/17 16:00  06/10/17 16:00  06/10/17 16:00  06/10/17 16:00








 Laboratory Results





 06/07/17 05:30 





 06/10/17 05:25 





 











 06/09/17 06/10/17 06/11/17





 05:59 05:59 05:59


 


Intake Total 600 900 


 


Output Total 1500 1350 575


 


Balance -900 -450 -575








 











PT  12.7 SEC (12.0-15.0)   06/03/17  18:15    


 


INR  0.96  (0.83-1.16)   06/03/17  18:15    














ICD10 Worksheet


Patient Problems: 


 Problems











Problem Status Onset


 


Abdominal pain Acute  


 


MRSA (methicillin resistant Staphylococcus aureus) Acute  ~06/04/17


 


Shortness of breath Acute  


 


Urinary tract infection Acute  


 


Bladder cancer Acute  


 


Edema Acute  


 


Gross hematuria Acute  


 


Hydronephrosis, left Acute 7456  Last data filed at 12/2/2020 0646  Gross per 24 hour   Intake 150 ml   Output 1050 ml   Net -900 ml       Physical Exam Performed:    BP (!) 180/90   Pulse 75   Temp 98.2 °F (36.8 °C) (Oral)   Resp 28   Ht 5' 5.98\" (1.676 m)   Wt 256 lb 6.3 oz (116.3 kg)   LMP  (LMP Unknown)   SpO2 90%   BMI 41.40 kg/m²     General appearance: No apparent distress, appears stated age and cooperative. HEENT: Pupils equal, round, and reactive to light. Conjunctivae/corneas clear. Neck: Supple, with full range of motion. No jugular venous distention. Trachea midline. Respiratory:  Normal respiratory effort. Diminished BS BL  Cardiovascular: Regular rate and rhythm with normal S1/S2   Abdomen: Soft, non-tender, non-distended with normal bowel sounds. Musculoskeletal: No clubbing, cyanosis or edema bilaterally. Skin: Skin color, texture, turgor normal.  No rashes or lesions. Neurologic:  Neurovascularly intact without any focal sensory/motor deficits. Cranial nerves: II-XII intact, grossly non-focal.  Psychiatric: Alert and oriented, thought content appropriate, normal insight  Capillary Refill: Brisk,< 3 seconds   Peripheral Pulses: +2 palpable, equal bilaterally       Labs:   Recent Labs     11/29/20  1538 11/30/20  0750 12/01/20  0948   WBC 12.7* 13.0* 13.7*   HGB 14.0 13.7 14.2   HCT 42.0 40.9 41.3    327 379     Recent Labs     11/29/20  1255 11/30/20  0750 12/01/20  0948    139 142   K 4.2 3.9 4.1    101 104   CO2 21 26 25   BUN 16 22* 24*   CREATININE 0.6 0.8 0.6   CALCIUM 9.3 9.1 9.3     Recent Labs     11/29/20  1255 11/30/20  0750 12/01/20  0948   AST 48* 52* 34   ALT 75* 71* 57*   BILITOT 0.5 0.5 0.6   ALKPHOS 100 100 103     No results for input(s): INR in the last 72 hours. No results for input(s): Les Miguelito in the last 72 hours.     Urinalysis:      Lab Results   Component Value Date    NITRU Negative 07/19/2019    BLOODU Negative 07/19/2019    SPECGRAV 1.026 07/19/2019 GLUCOSEU Negative 07/19/2019       Radiology:  XR CHEST PORTABLE   Final Result   Bilateral patchy consolidative opacities suggestive of multifocal infection,   including viral pneumonia. XR CHEST PORTABLE    (Results Pending)           Assessment/Plan:    COVID-19 pneumonia   chest x-ray with bilateral patchy opacities  Continue Decadron 20 mg daily  Convalescent plasma given  Remdesivir for 5 days  Lovenox twice daily    Acute respiratory failure with hypoxia  Continue to titrate to keep oxygen saturations above 90%  Now requiring 60 L oxygen, additional 15 L on nonrebreather  Levaquin for 5 days    Hypertension  Continue home medications    Hyperlipidemia  Continue home statin    Morbid Obesity  BMI 41  Counseled on weight lloss    DVT Prophylaxis: Lovenox twice daily  Diet: DIET GENERAL;  Code Status: DNR-CCA    PT/OT Eval Status: Not applicable    Dispo -inpatient, overall prognosis is guarded. With her oxygen demand so high and comorbid conditions I am concerned she will require BiPAP and/or intubation. She continues to refused intubation but today stated she would think about it when the time comes.          Erika Roman MD

## 2020-12-02 NOTE — PROGRESS NOTES
Pulmonary Progress Note    CC: Acute hypoxic respiratory failure  COVID-19 pneumonia  Elevated LFTs  Leukocytosis    24 hr events:  She reports shortness of breath with minimal activity. She is on OptiFlow and a nonrebreather mask. ROS:  +SOB  +cough  No vomiting     PHYSICAL EXAM:  Blood pressure (!) 193/85, pulse 73, temperature 98.3 °F (36.8 °C), temperature source Oral, resp. rate 22, height 5' 5.98\" (1.676 m), weight 256 lb 6.3 oz (116.3 kg), SpO2 94 %, not currently breastfeeding. On OptiFlow 55 L, 95% and NRBM    Intake/Output Summary (Last 24 hours) at 12/2/2020 1618  Last data filed at 12/2/2020 1408  Gross per 24 hour   Intake 270 ml   Output 1050 ml   Net -780 ml       Gen: Well developed; well nourished  Eyes: No scleral icterus. No conjunctival injection. ENT: External appearance of ears and nose normal.  Neck: Trachea midline. No obvious mass. No visible thyroid enlargement    Respiratory: Clear to auscultation bilaterally, no accessory muscle use  Cardiovascular: Regular rate and rhythm, no appreciable murmurs. No edema. Skin: Warm and dry. No rashes or ulcers on visible areas. Normal texture and turgor  Musculoskeletal: No cyanosis, clubbing or joint deformity.     Psychiatric: Normal mood and affect; exhibits normal insight and judgement     Medications:  Current Facility-Administered Medications: famotidine (PEPCID) tablet 20 mg, 20 mg, Oral, BID  vitamin C (ASCORBIC ACID) tablet 1,000 mg, 1,000 mg, Oral, BID  metoprolol (LOPRESSOR) injection 5 mg, 5 mg, Intravenous, Q6H PRN  hydrALAZINE (APRESOLINE) injection 10 mg, 10 mg, Intravenous, Q6H PRN  melatonin tablet 6 mg, 6 mg, Oral, Nightly  vitamin B-1 (THIAMINE) tablet 200 mg, 200 mg, Oral, Daily  zinc sulfate (ZINCATE) capsule 50 mg, 50 mg, Oral, BID  Vitamin D (CHOLECALCIFEROL) tablet 2,000 Units, 2,000 Units, Oral, Daily  dexamethasone (DECADRON) 20 mg in sodium chloride 0.9 % IVPB, 20 mg, Intravenous, Q24H  guaiFENesin-dextromethorphan (ROBITUSSIN DM) 100-10 MG/5ML syrup 5 mL, 5 mL, Oral, Q4H PRN  [COMPLETED] remdesivir 200 mg in sodium chloride 0.9 % 250 mL IVPB, 200 mg, Intravenous, Once **FOLLOWED BY** remdesivir 100 mg in sodium chloride 0.9 % 250 mL IVPB, 100 mg, Intravenous, Q24H  0.9 % sodium chloride bolus, 30 mL, Intravenous, PRN  albuterol sulfate  (90 Base) MCG/ACT inhaler 2 puff, 2 puff, Inhalation, Q6H PRN  sodium chloride flush 0.9 % injection 10 mL, 10 mL, Intravenous, 2 times per day  sodium chloride flush 0.9 % injection 10 mL, 10 mL, Intravenous, PRN  potassium chloride 10 mEq/100 mL IVPB (Peripheral Line), 10 mEq, Intravenous, PRN  magnesium sulfate 2 g in 50 mL IVPB premix, 2 g, Intravenous, PRN  acetaminophen (TYLENOL) tablet 650 mg, 650 mg, Oral, Q6H PRN **OR** acetaminophen (TYLENOL) suppository 650 mg, 650 mg, Rectal, Q6H PRN  magnesium hydroxide (MILK OF MAGNESIA) 400 MG/5ML suspension 30 mL, 30 mL, Oral, Daily PRN  promethazine (PHENERGAN) tablet 12.5 mg, 12.5 mg, Oral, Q6H PRN **OR** ondansetron (ZOFRAN) injection 4 mg, 4 mg, Intravenous, Q6H PRN  LORazepam (ATIVAN) tablet 0.5 mg, 0.5 mg, Oral, BID PRN  enoxaparin (LOVENOX) injection 40 mg, 40 mg, Subcutaneous, BID    Data reviewed:  Labs:  CBC:   Recent Labs     11/30/20  0750 12/01/20  0948 12/02/20  0915   WBC 13.0* 13.7* 11.8*   HGB 13.7 14.2 13.7   HCT 40.9 41.3 40.8   MCV 92.8 91.6 92.1    379 344     BMP:   Recent Labs     11/30/20  0750 12/01/20  0948 12/02/20  0915    142 139   K 3.9 4.1 3.6    104 104   CO2 26 25 25   BUN 22* 24* 26*   CREATININE 0.8 0.6 0.5*     LIVER PROFILE:   Recent Labs     11/30/20  0750 12/01/20  0948 12/02/20  0915   AST 52* 34 29   ALT 71* 57* 49*   BILITOT 0.5 0.6 0.6   ALKPHOS 100 103 86     PT/INR: No results for input(s): PROTIME, INR in the last 72 hours. APTT: No results for input(s): APTT in the last 72 hours. Films:  Chest images and reports were reviewed by me.  My interpretation is:  No new

## 2020-12-02 NOTE — PLAN OF CARE
Problem: Isolation Precautions - Risk of Spread of Infection  Goal: Prevent transmission of infection  Outcome: Met This Shift  Note: Remains in contact plus precautions. All visitors restricted. Problem: Airway Clearance - Ineffective  Goal: Achieve or maintain patent airway  Outcome: Ongoing  Note: Reports feeling better. Remains on high flow heated oxygen and NRB mask. Has been prone for comfort. Problem: Gas Exchange - Impaired  Goal: Absence of hypoxia  Outcome: Ongoing  Note: Continues to have oxygen drops with any activity.      Problem: Risk for Fluid Volume Deficit  Goal: Maintain normal heart rhythm  Outcome: Ongoing     Problem: Fatigue  Goal: Verbalize increase energy and improved vitality  Outcome: Ongoing

## 2020-12-03 NOTE — PROGRESS NOTES
Pt Sp02 is 95% while prone and on airvo and NRB at 15lpm.  When pt moves to her back the Sp02 drops into the low to mid 80s and doesn't come up further. Will continue to monitor.

## 2020-12-03 NOTE — PROGRESS NOTES
Hospitalist Progress Note      PCP: VENKAT Koo - CNP    Date of Admission: 11/28/2020    Chief Complaint: Shortness of breath    Hospital Course: 57 yo female admitted with lethargy, sob, tested + for covid 2 weeks ago. She required 4L O2 in the ED. However, since admission overnight she has needed more O2 and is now on vapotherm at 60L O2.     12/1 Patient's oxygen demands are remaining somewhat stable. She is still requiring 60 L along with a nonrebreather. Oxygen sats remain above 90%. Currently the patient is prone. Asked once again if the patient would like me to touch base with any family members and she is refusing. Patient also reiterates that she would not like to go to the ICU or be intubated. 12/2 Still requiring 60L along with NRB. Oxygen dropping with minimal exertion. Discussed once again talking with family and she refused. Stated her DIL is handling everything. Subjective: Patient seen and examined. Appears better today than yesterday. Overall oxygen demand coming down. Still needing NRB.      Medications:  Reviewed    Infusion Medications   Scheduled Medications    famotidine  20 mg Oral BID    vitamin C  1,000 mg Oral BID    melatonin  6 mg Oral Nightly    thiamine  200 mg Oral Daily    zinc sulfate  50 mg Oral BID    Vitamin D  2,000 Units Oral Daily    dexamethasone  20 mg Intravenous Q24H    remdesivir IVPB  100 mg Intravenous Q24H    sodium chloride flush  10 mL Intravenous 2 times per day    enoxaparin  40 mg Subcutaneous BID     PRN Meds: metoprolol, hydrALAZINE, guaiFENesin-dextromethorphan, sodium chloride, albuterol sulfate HFA, sodium chloride flush, potassium chloride, magnesium sulfate, acetaminophen **OR** acetaminophen, magnesium hydroxide, promethazine **OR** ondansetron, LORazepam      Intake/Output Summary (Last 24 hours) at 12/3/2020 0849  Last data filed at 12/3/2020 0600  Gross per 24 hour   Intake 550 ml   Output 600 ml   Net -50 ml Physical Exam Performed:    BP (!) 160/77   Pulse 81   Temp 98.1 °F (36.7 °C) (Axillary)   Resp 15   Ht 5' 5.98\" (1.676 m)   Wt 253 lb 12 oz (115.1 kg)   LMP  (LMP Unknown)   SpO2 93%   BMI 40.98 kg/m²     General appearance: No apparent distress, appears stated age and cooperative. HEENT: Pupils equal, round, and reactive to light. Conjunctivae/corneas clear. Neck: Supple, with full range of motion. No jugular venous distention. Trachea midline. Respiratory:  Normal respiratory effort. Diminished BS BL  Cardiovascular: Regular rate and rhythm with normal S1/S2   Abdomen: Soft, non-tender, non-distended with normal bowel sounds. Musculoskeletal: No clubbing, cyanosis or edema bilaterally. Skin: Skin color, texture, turgor normal.  No rashes or lesions. Neurologic:  Neurovascularly intact without any focal sensory/motor deficits. Cranial nerves: II-XII intact, grossly non-focal.  Psychiatric: Alert and oriented, thought content appropriate, normal insight  Capillary Refill: Brisk,< 3 seconds   Peripheral Pulses: +2 palpable, equal bilaterally       Labs:   Recent Labs     12/01/20  0948 12/02/20  0915 12/03/20  0610   WBC 13.7* 11.8* 13.9*   HGB 14.2 13.7 14.4   HCT 41.3 40.8 43.0    344 399     Recent Labs     12/01/20  0948 12/02/20  0915 12/03/20  0610    139 138   K 4.1 3.6 4.0    104 103   CO2 25 25 24   BUN 24* 26* 23*   CREATININE 0.6 0.5* <0.5*   CALCIUM 9.3 8.8 9.0     Recent Labs     12/01/20  0948 12/02/20  0915 12/03/20  0610   AST 34 29 24   ALT 57* 49* 52*   BILITOT 0.6 0.6 0.5   ALKPHOS 103 86 85     No results for input(s): INR in the last 72 hours. No results for input(s): Leidy Tony in the last 72 hours.     Urinalysis:      Lab Results   Component Value Date    NITRU Negative 07/19/2019    BLOODU Negative 07/19/2019    SPECGRAV 1.026 07/19/2019    GLUCOSEU Negative 07/19/2019       Radiology:  XR CHEST PORTABLE   Final Result   Bilateral patchy consolidative opacities suggestive of multifocal infection,   including viral pneumonia. XR CHEST PORTABLE    (Results Pending)           Assessment/Plan:    COVID-19 pneumonia   chest x-ray with bilateral patchy opacities  Continue Decadron 20 mg daily  Convalescent plasma given  Remdesivir for 5 days  Lovenox twice daily    Acute respiratory failure with hypoxia  Continue to titrate to keep oxygen saturations above 90%  Now requiring 60 L oxygen, additional 15 L on nonrebreather  Levaquin for 5 days  Overall resp status tenuos and may need BiPAP    Hypertension  Continue home medications    Hyperlipidemia  Continue home statin    Morbid Obesity  BMI 41  Counseled on weight lloss    DVT Prophylaxis: Lovenox twice daily  Diet: DIET FULL LIQUID;  Code Status: DNR-CCA    PT/OT Eval Status: Not applicable    Dispo -inpatient, overall prognosis is guarded. With her oxygen demand so high and comorbid conditions I am concerned she will require BiPAP and/or intubation. She continues to refused intubation but today stated she would think about it when the time comes.          Milana Rosario MD

## 2020-12-03 NOTE — CARE COORDINATION
SW called to patient's room to complete ACP documents. Patient did not answer at this time. Patient currently on 60L of oxygen.    DARIN Desai, [place], [employer]  [phone]  Electronically signed by DARIN Desai, LSW on 12/3/2020 at 12:25 PM

## 2020-12-03 NOTE — PROGRESS NOTES
Physical Therapy  Attempted to see Pt., upon attempting RT states Pt. Is in prone position, very anxious and difficulty maintaining SpO2. Will defer at this time until more medically appropriate. Vera Silvestre.  Abhilash Jamaal, [place], [id number]

## 2020-12-03 NOTE — PROGRESS NOTES
Pt put on BIPAP to prepare for getting a PICC placed. Multiple failed attempts at new IV's tried. Pt is comfortable on the bipap and is breathing easily.

## 2020-12-03 NOTE — PROGRESS NOTES
Pulmonary Progress Note    CC: Acute hypoxic respiratory failure  COVID-19 pneumonia  Elevated LFTs  Leukocytosis    24 hr events: On optiflow and non re-breather mask. She denies shortness of breath, but has not been out of bed. She has an intermittent cough. ROS:  No SOB  +cough  No vomiting     PHYSICAL EXAM:  Blood pressure 110/70, pulse 76, temperature 97.9 °F (36.6 °C), temperature source Oral, resp. rate 22, height 5' 5.98\" (1.676 m), weight 253 lb 12 oz (115.1 kg), SpO2 92 %, not currently breastfeeding. On OptiFlow 55 L, 95% and NRBM    Intake/Output Summary (Last 24 hours) at 12/3/2020 1850  Last data filed at 12/3/2020 0600  Gross per 24 hour   Intake 370 ml   Output 600 ml   Net -230 ml       Gen: Well developed; well nourished  Eyes: No scleral icterus. No conjunctival injection. ENT: External appearance of ears and nose normal.  Neck: Trachea midline. No obvious mass. No visible thyroid enlargement    Respiratory: Clear to auscultation bilaterally, no accessory muscle use  Cardiovascular: Regular rate and rhythm, no appreciable murmurs. No edema. Skin: Warm and dry. No rashes or ulcers on visible areas. Normal texture and turgor  Musculoskeletal: No cyanosis, clubbing or joint deformity.     Psychiatric: Normal mood and affect; exhibits normal insight and judgement     Medications:  Current Facility-Administered Medications: lidocaine 1 % injection 5 mL, 5 mL, Intradermal, Once  famotidine (PEPCID) tablet 20 mg, 20 mg, Oral, BID  vitamin C (ASCORBIC ACID) tablet 1,000 mg, 1,000 mg, Oral, BID  metoprolol (LOPRESSOR) injection 5 mg, 5 mg, Intravenous, Q6H PRN  hydrALAZINE (APRESOLINE) injection 10 mg, 10 mg, Intravenous, Q6H PRN  melatonin tablet 6 mg, 6 mg, Oral, Nightly  vitamin B-1 (THIAMINE) tablet 200 mg, 200 mg, Oral, Daily  zinc sulfate (ZINCATE) capsule 50 mg, 50 mg, Oral, BID  Vitamin D (CHOLECALCIFEROL) tablet 2,000 Units, 2,000 Units, Oral, Daily  dexamethasone (DECADRON) 20 mg in sodium chloride 0.9 % IVPB, 20 mg, Intravenous, Q24H  guaiFENesin-dextromethorphan (ROBITUSSIN DM) 100-10 MG/5ML syrup 5 mL, 5 mL, Oral, Q4H PRN  0.9 % sodium chloride bolus, 30 mL, Intravenous, PRN  albuterol sulfate  (90 Base) MCG/ACT inhaler 2 puff, 2 puff, Inhalation, Q6H PRN  sodium chloride flush 0.9 % injection 10 mL, 10 mL, Intravenous, 2 times per day  sodium chloride flush 0.9 % injection 10 mL, 10 mL, Intravenous, PRN  potassium chloride 10 mEq/100 mL IVPB (Peripheral Line), 10 mEq, Intravenous, PRN  magnesium sulfate 2 g in 50 mL IVPB premix, 2 g, Intravenous, PRN  acetaminophen (TYLENOL) tablet 650 mg, 650 mg, Oral, Q6H PRN **OR** acetaminophen (TYLENOL) suppository 650 mg, 650 mg, Rectal, Q6H PRN  magnesium hydroxide (MILK OF MAGNESIA) 400 MG/5ML suspension 30 mL, 30 mL, Oral, Daily PRN  promethazine (PHENERGAN) tablet 12.5 mg, 12.5 mg, Oral, Q6H PRN **OR** ondansetron (ZOFRAN) injection 4 mg, 4 mg, Intravenous, Q6H PRN  LORazepam (ATIVAN) tablet 0.5 mg, 0.5 mg, Oral, BID PRN  enoxaparin (LOVENOX) injection 40 mg, 40 mg, Subcutaneous, BID    Data reviewed:  Labs:  CBC:   Recent Labs     12/01/20  0948 12/02/20  0915 12/03/20  0610   WBC 13.7* 11.8* 13.9*   HGB 14.2 13.7 14.4   HCT 41.3 40.8 43.0   MCV 91.6 92.1 92.5    344 399     BMP:   Recent Labs     12/01/20  0948 12/02/20  0915 12/03/20  0610    139 138   K 4.1 3.6 4.0    104 103   CO2 25 25 24   BUN 24* 26* 23*   CREATININE 0.6 0.5* <0.5*     LIVER PROFILE:   Recent Labs     12/01/20  0948 12/02/20  0915 12/03/20  0610   AST 34 29 24   ALT 57* 49* 52*   BILITOT 0.6 0.6 0.5   ALKPHOS 103 86 85     PT/INR: No results for input(s): PROTIME, INR in the last 72 hours. APTT:   Recent Labs     12/03/20  0610   APTT 30.0         Films:  Chest images and reports were reviewed by me.  My interpretation is:  No new images    Assessment:     Acute hypoxic respiratory failure  COVID-19 pneumonia  Elevated LFTs  Leukocytosis      Plan:    Acute hypoxic respiratory failure  -Due to COVID-19  -On OptiFlow and nonrebreather mask. Wean as able to keep oxygen saturation greater than 90%    COVID-19 pneumonia  -Decadron 20 mg daily (day #5)  -Remdesivir day #5/5  -Status post convalescent plasma on 11/29    Elevated LFTs  -Likely due to COVID-19. Check daily LFTs    Leukocytosis  -Likely due to steroids. Monitor      Patient is at high risk given the presence of respiratory failure requiring the use of OptiFlow. Thank you for allowing me to participate in the care of this patient. Will follow.      Yury Duncan MD  [facility] Pulmonary, Critical Care and Sleep

## 2020-12-04 NOTE — PLAN OF CARE
Problem: Airway Clearance - Ineffective  Goal: Achieve or maintain patent airway  12/4/2020 0323 by Liang Stevens RN  Outcome: Ongoing     Problem: Gas Exchange - Impaired  Goal: Absence of hypoxia  12/4/2020 0323 by Liang Stevens RN  Outcome: Ongoing     Problem: Gas Exchange - Impaired  Goal: Promote optimal lung function  12/4/2020 0323 by Liang Stevens RN  Outcome: Ongoing     Problem: Breathing Pattern - Ineffective  Goal: Ability to achieve and maintain a regular respiratory rate  12/4/2020 0323 by Liang Stevens RN  Outcome: Ongoing     Problem: Body Temperature -  Risk of, Imbalanced  Goal: Ability to maintain a body temperature within defined limits  12/4/2020 0323 by Liang Stevens RN  Outcome: Ongoing     Problem: Body Temperature -  Risk of, Imbalanced  Goal: Will regain or maintain usual level of consciousness  12/4/2020 0323 by Liang Stevens RN  Outcome: Ongoing     Problem:  Body Temperature -  Risk of, Imbalanced  Goal: Complications related to the disease process, condition or treatment will be avoided or minimized  12/4/2020 0323 by Liang Stevens RN  Outcome: Ongoing     Problem: Isolation Precautions - Risk of Spread of Infection  Goal: Prevent transmission of infection  12/4/2020 0323 by Liang Stevens RN  Outcome: Ongoing     Problem: Nutrition Deficits  Goal: Optimize nutrtional status  12/4/2020 0323 by Liang Stevens RN  Outcome: Ongoing     Problem: Risk for Fluid Volume Deficit  Goal: Maintain normal heart rhythm  12/4/2020 0323 by Liang Stevens RN  Outcome: Ongoing     Problem: Risk for Fluid Volume Deficit  Goal: Maintain absence of muscle cramping  12/4/2020 0323 by Liang Stevens RN  Outcome: Ongoing     Problem: Risk for Fluid Volume Deficit  Goal: Maintain normal serum potassium, sodium, calcium, phosphorus, and pH  12/4/2020 0323 by Liang Stevens RN  Outcome: Ongoing     Problem: Loneliness or Risk for Loneliness  Goal: Demonstrate positive use of time alone when socialization is not possible  12/4/2020 0323 by Yi Bowman RN  Outcome: Ongoing     Problem: Fatigue  Goal: Verbalize increase energy and improved vitality  12/4/2020 0323 by Yi Bowman RN  Outcome: Ongoing     Problem: Patient Education: Go to Patient Education Activity  Goal: Patient/Family Education  12/4/2020 0323 by Yi Bowman RN  Outcome: Ongoing     Problem: Skin Integrity:  Goal: Will show no infection signs and symptoms  Description: Will show no infection signs and symptoms  Outcome: Ongoing     Problem: Skin Integrity:  Goal: Absence of new skin breakdown  Description: Absence of new skin breakdown  Outcome: Ongoing

## 2020-12-04 NOTE — PROGRESS NOTES
Pt put prone again and she requested trying the airvo and NRB. Pt desat to 85 and stayed their for minutes. Pt remained prone but is back on bipap.   Sp02 is back into the 90s

## 2020-12-04 NOTE — PROGRESS NOTES
Upon arrival to place PICC line assessed chart for issues related to picc placement, check for consent, and did time out with DAYANARA Sebastian. Pt. Tolerated PICC placement well, no difficulty accessing basilic vein and 3CG technology used to verify PICC tip placement. Positive P wave with no negative deflection. Printed wave form and placed In chart. Reported off to [email or web address].  Line ok to use

## 2020-12-04 NOTE — PROGRESS NOTES
Comprehensive Nutrition Assessment    Type and Reason for Visit:  Initial    Nutrition Recommendations/Plan:   Full Liquids with Ensure TID  If pt requires continuous bipap may need to explore alternative nutrition such as post pyloric small bore feeding tube for EN    Nutrition Assessment:  LOS. Pt with Covid-19 infection. Hx also includes HLD, HTN. Pt with increasing oxygen requirements, now requiring bipap today. Is on Full Liquid (? d/t bipap), intakes have been poor throughout admission. Will order supplement. Though if pt requires continuous bipap may need to explore alternative nutrition such as post pyloric small bore feeding tube for EN. Malnutrition Assessment:  Malnutrition Status:  Insufficient data      Estimated Daily Nutrient Needs:  Energy (kcal):  3500-2022 kcal (15-18 kcal/kg ABW); Weight Used for Energy Requirements:  Current     Protein (g):   gm (1.5-2 gm/kg IBW); Weight Used for Protein Requirements:  Ideal        Fluid (ml/day):  1 ml/kcal; Method Used for Fluid Requirements:  1 ml/kcal      Nutrition Related Findings:  trace edema to BLE; no recent BM recorded      Wounds:  None       Current Nutrition Therapies:    DIET FULL LIQUID; Anthropometric Measures:  · Height: 5' 6\" (167.6 cm)  · Current Body Weight: 255 lb (115.7 kg)   · Admission Body Weight: 257 lb (116.6 kg)    · Ideal Body Weight: 130 lbs; % Ideal Body Weight 196.2 %   · BMI: 41.2  · Adjusted Body Weight:  ; No Adjustment   · BMI Categories: Obese Class 3 (BMI 40.0 or greater)       Nutrition Diagnosis:   · Inadequate oral intake related to impaired respiratory function as evidenced by (Varied meal intake)    Nutrition Interventions:   Food and/or Nutrient Delivery:  Continue Current Diet, Start Oral Nutrition Supplement  Nutrition Education/Counseling:  No recommendation at this time   Coordination of Nutrition Care:  Continue to monitor while inpatient    Goals:   Tolerate diet and consume greater than 50% of meals and supplements this admission       Nutrition Monitoring and Evaluation:   Behavioral-Environmental Outcomes:  None Identified   Food/Nutrient Intake Outcomes:  Food and Nutrient Intake, Supplement Intake  Physical Signs/Symptoms Outcomes:  Biochemical Data, Hemodynamic Status, Nutrition Focused Physical Findings, Skin, Weight     Discharge Planning:     Too soon to determine     Electronically signed by Shannan Jones RD, ALYSE on 12/4/20 at 2:29 PM EST    Contact: [phone]

## 2020-12-04 NOTE — PROGRESS NOTES
Hospitalist Progress Note      PCP: VENKAT Negron - CNP    Date of Admission: 11/28/2020    Chief Complaint: Shortness of breath    Hospital Course: 57 yo female admitted with lethargy, sob, tested + for covid 2 weeks ago. She required 4L O2 in the ED. However, since admission overnight she has needed more O2 and is now on vapotherm at 60L O2.     12/1 Patient's oxygen demands are remaining somewhat stable. She is still requiring 60 L along with a nonrebreather. Oxygen sats remain above 90%. Currently the patient is prone. Asked once again if the patient would like me to touch base with any family members and she is refusing. Patient also reiterates that she would not like to go to the ICU or be intubated. 12/2 Still requiring 60L along with NRB. Oxygen dropping with minimal exertion. Discussed once again talking with family and she refused. Stated her DIL is handling everything. Subjective: Patient seen and examined. Patient now requiring BiPAP after moving around in bed. Does pretty well while proning but after that needs increased oxygen amounts.   Will call daughter-in-law today    Medications:  Reviewed    Infusion Medications   Scheduled Medications    lidocaine 1 % injection  5 mL Intradermal Once    famotidine  20 mg Oral BID    vitamin C  1,000 mg Oral BID    melatonin  6 mg Oral Nightly    thiamine  200 mg Oral Daily    zinc sulfate  50 mg Oral BID    Vitamin D  2,000 Units Oral Daily    dexamethasone  20 mg Intravenous Q24H    sodium chloride flush  10 mL Intravenous 2 times per day    enoxaparin  40 mg Subcutaneous BID     PRN Meds: metoprolol, hydrALAZINE, guaiFENesin-dextromethorphan, sodium chloride, albuterol sulfate HFA, sodium chloride flush, potassium chloride, magnesium sulfate, acetaminophen **OR** acetaminophen, magnesium hydroxide, promethazine **OR** ondansetron, LORazepam      Intake/Output Summary (Last 24 hours) at 12/4/2020 0006  Last data filed at 12/4/2020 0600  Gross per 24 hour   Intake 480 ml   Output 450 ml   Net 30 ml       Physical Exam Performed:    BP (!) 157/82   Pulse 65   Temp 98 °F (36.7 °C) (Axillary)   Resp 20   Ht 5' 5.98\" (1.676 m)   Wt 255 lb 8.2 oz (115.9 kg)   LMP  (LMP Unknown)   SpO2 90%   BMI 41.26 kg/m²     General appearance: No apparent distress, appears stated age and cooperative, on BiPAP  HEENT: Pupils equal, round, and reactive to light. Conjunctivae/corneas clear. Neck: Supple, with full range of motion. No jugular venous distention. Trachea midline. Respiratory:  Normal respiratory effort. Diminished BS BL  Cardiovascular: Regular rate and rhythm with normal S1/S2   Abdomen: Soft, non-tender, non-distended with normal bowel sounds. Musculoskeletal: No clubbing, cyanosis or edema bilaterally. Skin: Skin color, texture, turgor normal.  No rashes or lesions. Neurologic:  Neurovascularly intact without any focal sensory/motor deficits. Cranial nerves: II-XII intact, grossly non-focal.  Psychiatric: Alert and oriented, thought content appropriate, normal insight  Capillary Refill: Brisk,< 3 seconds   Peripheral Pulses: +2 palpable, equal bilaterally       Labs:   Recent Labs     12/02/20  0915 12/03/20  0610 12/04/20  0600   WBC 11.8* 13.9* 12.2*   HGB 13.7 14.4 14.1   HCT 40.8 43.0 41.4    399 400     Recent Labs     12/02/20  0915 12/03/20  0610 12/04/20  0600    138 139   K 3.6 4.0 4.2    103 104   CO2 25 24 26   BUN 26* 23* 27*   CREATININE 0.5* <0.5* 0.5*   CALCIUM 8.8 9.0 8.9     Recent Labs     12/01/20  0948 12/02/20  0915 12/03/20  0610   AST 34 29 24   ALT 57* 49* 52*   BILITOT 0.6 0.6 0.5   ALKPHOS 103 86 85     No results for input(s): INR in the last 72 hours. No results for input(s): Lore Lowers in the last 72 hours.     Urinalysis:      Lab Results   Component Value Date    NITRU Negative 07/19/2019    BLOODU Negative 07/19/2019    SPECGRAV 1.026 07/19/2019    GLUCOSEU Negative

## 2020-12-04 NOTE — PLAN OF CARE
Problem: Airway Clearance - Ineffective  Goal: Achieve or maintain patent airway  12/4/2020 1033 by Destinee Mejia RN  Outcome: Ongoing  12/4/2020 0323 by Dariana Multani RN  Outcome: Ongoing     Problem: Gas Exchange - Impaired  Goal: Absence of hypoxia  12/4/2020 1033 by Destinee Mejia RN  Outcome: Ongoing  12/4/2020 0323 by Dariana Multani RN  Outcome: Ongoing  Goal: Promote optimal lung function  12/4/2020 1033 by Destinee Mejia RN  Outcome: Ongoing  12/4/2020 0323 by Dariana Multani RN  Outcome: Ongoing     Problem: Breathing Pattern - Ineffective  Goal: Ability to achieve and maintain a regular respiratory rate  12/4/2020 1033 by Destinee Mejia RN  Outcome: Ongoing  12/4/2020 0323 by Dariana Multani RN  Outcome: Ongoing     Problem:  Body Temperature -  Risk of, Imbalanced  Goal: Ability to maintain a body temperature within defined limits  12/4/2020 1033 by Destinee Mejia RN  Outcome: Ongoing  12/4/2020 0323 by Dariana Multani RN  Outcome: Ongoing  Goal: Will regain or maintain usual level of consciousness  12/4/2020 1033 by Destinee Mejia RN  Outcome: Ongoing  12/4/2020 0323 by Dariana Multani RN  Outcome: Ongoing  Goal: Complications related to the disease process, condition or treatment will be avoided or minimized  12/4/2020 1033 by Destinee Mejia RN  Outcome: Ongoing  12/4/2020 0323 by Dariana Multani RN  Outcome: Ongoing

## 2020-12-04 NOTE — PROGRESS NOTES
Pulmonary Progress Note    CC: Acute hypoxic respiratory failure  COVID-19 pneumonia  Elevated LFTs  Leukocytosis    24 hr events:  Gradual decline in oxygenation. She is now requiring BiPAP at 100%. She denies shortness of breath. ROS:  No SOB  +cough  No vomiting     PHYSICAL EXAM:  Blood pressure (!) 150/78, pulse 89, temperature 98.5 °F (36.9 °C), temperature source Axillary, resp. rate 18, height 5' 6\" (1.676 m), weight 255 lb 8.2 oz (115.9 kg), SpO2 (!) 89 %, not currently breastfeeding. BiPAP     Intake/Output Summary (Last 24 hours) at 12/4/2020 1559  Last data filed at 12/4/2020 0600  Gross per 24 hour   Intake 480 ml   Output 450 ml   Net 30 ml       Gen: Well developed; well nourished  Eyes: No scleral icterus. No conjunctival injection. ENT: External appearance of ears and nose normal.  Neck: Trachea midline. No obvious mass. No visible thyroid enlargement    Respiratory: Clear to auscultation bilaterally, no accessory muscle use  Cardiovascular: Regular rate and rhythm, no appreciable murmurs. No edema. Skin: Warm and dry. No rashes or ulcers on visible areas. Normal texture and turgor  Musculoskeletal: No cyanosis, clubbing or joint deformity.     Psychiatric: Normal mood and affect; exhibits normal insight and judgement     Medications:  Current Facility-Administered Medications: lidocaine 1 % injection 5 mL, 5 mL, Intradermal, Once  famotidine (PEPCID) tablet 20 mg, 20 mg, Oral, BID  vitamin C (ASCORBIC ACID) tablet 1,000 mg, 1,000 mg, Oral, BID  metoprolol (LOPRESSOR) injection 5 mg, 5 mg, Intravenous, Q6H PRN  hydrALAZINE (APRESOLINE) injection 10 mg, 10 mg, Intravenous, Q6H PRN  melatonin tablet 6 mg, 6 mg, Oral, Nightly  vitamin B-1 (THIAMINE) tablet 200 mg, 200 mg, Oral, Daily  zinc sulfate (ZINCATE) capsule 50 mg, 50 mg, Oral, BID  Vitamin D (CHOLECALCIFEROL) tablet 2,000 Units, 2,000 Units, Oral, Daily  dexamethasone (DECADRON) 20 mg in sodium chloride 0.9 % IVPB, 20 mg, Intravenous, Q24H  guaiFENesin-dextromethorphan (ROBITUSSIN DM) 100-10 MG/5ML syrup 5 mL, 5 mL, Oral, Q4H PRN  0.9 % sodium chloride bolus, 30 mL, Intravenous, PRN  albuterol sulfate  (90 Base) MCG/ACT inhaler 2 puff, 2 puff, Inhalation, Q6H PRN  sodium chloride flush 0.9 % injection 10 mL, 10 mL, Intravenous, 2 times per day  sodium chloride flush 0.9 % injection 10 mL, 10 mL, Intravenous, PRN  potassium chloride 10 mEq/100 mL IVPB (Peripheral Line), 10 mEq, Intravenous, PRN  magnesium sulfate 2 g in 50 mL IVPB premix, 2 g, Intravenous, PRN  acetaminophen (TYLENOL) tablet 650 mg, 650 mg, Oral, Q6H PRN **OR** acetaminophen (TYLENOL) suppository 650 mg, 650 mg, Rectal, Q6H PRN  magnesium hydroxide (MILK OF MAGNESIA) 400 MG/5ML suspension 30 mL, 30 mL, Oral, Daily PRN  promethazine (PHENERGAN) tablet 12.5 mg, 12.5 mg, Oral, Q6H PRN **OR** ondansetron (ZOFRAN) injection 4 mg, 4 mg, Intravenous, Q6H PRN  LORazepam (ATIVAN) tablet 0.5 mg, 0.5 mg, Oral, BID PRN  enoxaparin (LOVENOX) injection 40 mg, 40 mg, Subcutaneous, BID    Data reviewed:  Labs:  CBC:   Recent Labs     12/02/20  0915 12/03/20  0610 12/04/20  0600   WBC 11.8* 13.9* 12.2*   HGB 13.7 14.4 14.1   HCT 40.8 43.0 41.4   MCV 92.1 92.5 91.5    399 400     BMP:   Recent Labs     12/02/20  0915 12/03/20  0610 12/04/20  0600    138 139   K 3.6 4.0 4.2    103 104   CO2 25 24 26   BUN 26* 23* 27*   CREATININE 0.5* <0.5* 0.5*     LIVER PROFILE:   Recent Labs     12/02/20  0915 12/03/20  0610   AST 29 24   ALT 49* 52*   BILITOT 0.6 0.5   ALKPHOS 86 85     PT/INR: No results for input(s): PROTIME, INR in the last 72 hours. APTT:   Recent Labs     12/03/20  0610   APTT 30.0         Films:  Chest images and reports were reviewed by me.  My interpretation is:  No new images    Assessment:     Acute hypoxic respiratory failure  COVID-19 pneumonia  Elevated LFTs  Leukocytosis      Plan:    Acute hypoxic respiratory failure  -Due to COVID-19 pneumonia  -Now on BiPAP. Wean as able to keep oxygen saturation greater than 90%. Discussed with patient that depending on how her oxygenation does over the next 24 hours or so she may need to be transferred to the intensive care unit. At this time we will continue treatment in PCU    COVID-19 pneumonia  -Decadron 20 mg daily. Change to 10 mg daily tomorrow  -Completed 5 days of remdesivir  -Status post convalescent plasma on 11/29  -Check CRP and ferritin    Elevated LFTs  -Likely due to COVID-19. Check daily LFTs    Leukocytosis  -Likely due to steroids. Monitor      Patient is at high risk given the presence of respiratory failure requiring the use of noninvasive ventilation. Thank you for allowing me to participate in the care of this patient. Will follow.      MD Lulu Reveles Pulmonary, Critical Care and Sleep

## 2020-12-04 NOTE — CARE COORDINATION
SW spoke with RN regarding patient's ACP documents. SW confirmed patient's decision maker as legal next of kin and verified patient's wishes. Patient not ready for discharge at this time as she is on 60L O2.   PT/OT ordered for patient. AYDEN/KEVIN to follow.     DARIN Daniel, [place], Social Work  [phone]  Electronically signed by DARIN Daniel, hospitals on 12/4/2020 at 3:47 PM

## 2020-12-05 NOTE — PLAN OF CARE
Problem: Airway Clearance - Ineffective  Goal: Achieve or maintain patent airway  Outcome: Ongoing   Pt going back and forth between airvo and bipap to maintain oxygen saturation above 90%     Problem: Breathing Pattern - Ineffective  Goal: Ability to achieve and maintain a regular respiratory rate  Outcome: Ongoing     Problem: Risk for Fluid Volume Deficit  Goal: Maintain normal heart rhythm  Outcome: Ongoing     Problem: Nutrition  Goal: Optimal nutrition therapy  Outcome: Ongoing   Nutrition status assessed and documented. Patient encouraged to take time while eating. Patient educated on the importance of sodium and fluid intake in meals. Will continue to monitor.

## 2020-12-05 NOTE — PROGRESS NOTES
Pulmonary Progress Note    CC:  Follow up respiratory failure, COVID    Subjective:  On vapotherm with flow at 60 liters and FIO2 of 100% and NRB  Laying prone. Feels like she is getting better. Asking for reassurance. SONIA  Says less SOB      Intake/Output Summary (Last 24 hours) at 12/5/2020 1148  Last data filed at 12/5/2020 0626  Gross per 24 hour   Intake 0 ml   Output 1100 ml   Net -1100 ml         PHYSICAL EXAM:  Blood pressure (!) 152/91, pulse 103, temperature 98.3 °F (36.8 °C), temperature source Axillary, resp. rate 24, height 5' 6\" (1.676 m), weight 255 lb 11.7 oz (116 kg), SpO2 (!) 88 %, not currently breastfeeding.'  Gen: Mild distress, prone. On vapotherm and NRB  Eyes: PERRL. No sclera icterus. No conjunctival injection. ENT: No discharge. Pharynx clear. External appearance of ears and nose normal.  Neck: Trachea midline. No obvious mass. Resp: Diminished, faint crackles  CV: hard to auscultate due to prone   GI: Unable   Skin: Warm, dry, normal texture and turgor. No nodule on exposed extremities. Lymph: No cervical LAD. No supraclavicular LAD. M/S: No cyanosis. No clubbing. No joint deformity. Neuro: Moves all four extremities. CN 2-12 tested, no defect noted.   Ext:   trace edema    Medications:    Scheduled Meds:   dexamethasone  10 mg Intravenous Q24H    lidocaine 1 % injection  5 mL Intradermal Once    famotidine  20 mg Oral BID    vitamin C  1,000 mg Oral BID    melatonin  6 mg Oral Nightly    thiamine  200 mg Oral Daily    zinc sulfate  50 mg Oral BID    Vitamin D  2,000 Units Oral Daily    sodium chloride flush  10 mL Intravenous 2 times per day    enoxaparin  40 mg Subcutaneous BID       Continuous Infusions:      PRN Meds:  metoprolol, hydrALAZINE, guaiFENesin-dextromethorphan, sodium chloride, albuterol sulfate HFA, sodium chloride flush, potassium chloride, magnesium sulfate, acetaminophen **OR** acetaminophen, magnesium hydroxide, promethazine **OR** ondansetron, LORazepam    Labs:  CBC:   Recent Labs     12/03/20  0610 12/04/20 0600 12/05/20 0620   WBC 13.9* 12.2* 15.7*   HGB 14.4 14.1 14.2   HCT 43.0 41.4 42.3   MCV 92.5 91.5 92.1    400 396     BMP:   Recent Labs     12/03/20 0610 12/04/20 0600 12/05/20 0620    139 140   K 4.0 4.2 4.4    104 103   CO2 24 26 26   BUN 23* 27* 29*   CREATININE <0.5* 0.5* 0.6     LIVER PROFILE:   Recent Labs     12/03/20 0610 12/05/20 0620   AST 24 20   ALT 52* 38   BILIDIR  --  <0.2   BILITOT 0.5 0.6   ALKPHOS 85 84     PT/INR: No results for input(s): PROTIME, INR in the last 72 hours. APTT:   Recent Labs     12/03/20 0610   APTT 30.0     UA:No results for input(s): NITRITE, COLORU, PHUR, LABCAST, WBCUA, RBCUA, MUCUS, TRICHOMONAS, YEAST, BACTERIA, CLARITYU, SPECGRAV, LEUKOCYTESUR, UROBILINOGEN, BILIRUBINUR, BLOODU, GLUCOSEU, AMORPHOUS in the last 72 hours. Invalid input(s): Mardel Greener  No results for input(s): PH, PCO2, PO2 in the last 72 hours. Films:  Chest imaging reports were reviewed and imaging was reviewed by me and showed no new films to review    ABG:  None    Cultures:  None    I reviewed the labs and images listed above    Assessment:   · Acute Hypoxic Respiratory Failure with saturations less than 90% on room air  · COVID-19 Pneumonia  · ARDS      Plan:  Titrate oxygen for saturations greater than or equal to 88%. Trying to self prone. Continue as tolerated. Bilevel PRN  Decadron 10 mg to finish 5 days.   This would complete 10 days or more  Lovenox for DVT prophylaxis  Increase activity as saturations tolerate it        Nathan Carreon DO  [facility] Pulmonary

## 2020-12-05 NOTE — PROGRESS NOTES
Hospitalist Progress Note      PCP: VENKAT Cardenas - CNP    Date of Admission: 11/28/2020    Chief Complaint: Shortness of breath    Hospital Course: 55 yo female admitted with lethargy, sob, tested + for covid 2 weeks ago. She required 4L O2 in the ED. However, since admission overnight she has needed more O2 and is now on vapotherm at 60L O2.     12/1 Patient's oxygen demands are remaining somewhat stable. She is still requiring 60 L along with a nonrebreather. Oxygen sats remain above 90%. Currently the patient is prone. Asked once again if the patient would like me to touch base with any family members and she is refusing. Patient also reiterates that she would not like to go to the ICU or be intubated. 12/2 Still requiring 60L along with NRB. Oxygen dropping with minimal exertion. Discussed once again talking with family and she refused. Stated her DIL is handling everything. 12/4 Patient now requiring BiPAP after moving around in bed. Does pretty well while proning but after that needs increased oxygen amounts. Will call daughter-in-law today    Subjective: Patient seen and examined. Patient tolerating the OptiFlow and nonrebreather today. Discussed situation with patient's daughter-in-law yesterday. She is aware of the severity of situation and how close she is to intubation.     Medications:  Reviewed    Infusion Medications   Scheduled Medications    dexamethasone  10 mg Intravenous Q24H    lidocaine 1 % injection  5 mL Intradermal Once    famotidine  20 mg Oral BID    vitamin C  1,000 mg Oral BID    melatonin  6 mg Oral Nightly    thiamine  200 mg Oral Daily    zinc sulfate  50 mg Oral BID    Vitamin D  2,000 Units Oral Daily    sodium chloride flush  10 mL Intravenous 2 times per day    enoxaparin  40 mg Subcutaneous BID     PRN Meds: metoprolol, hydrALAZINE, guaiFENesin-dextromethorphan, sodium chloride, albuterol sulfate HFA, sodium chloride flush, potassium chloride, magnesium sulfate, acetaminophen **OR** acetaminophen, magnesium hydroxide, promethazine **OR** ondansetron, LORazepam      Intake/Output Summary (Last 24 hours) at 12/5/2020 0848  Last data filed at 12/5/2020 3889  Gross per 24 hour   Intake 0 ml   Output 1100 ml   Net -1100 ml       Physical Exam Performed:    BP (!) 176/98   Pulse 91   Temp 97.8 °F (36.6 °C) (Axillary)   Resp 19   Ht 5' 6\" (1.676 m)   Wt 255 lb 11.7 oz (116 kg)   LMP  (LMP Unknown)   SpO2 93%   BMI 41.28 kg/m²     General appearance: No apparent distress, appears stated age and cooperative, on BiPAP  HEENT: Pupils equal, round, and reactive to light. Conjunctivae/corneas clear. Neck: Supple, with full range of motion. No jugular venous distention. Trachea midline. Respiratory:  Normal respiratory effort. Diminished BS BL  Cardiovascular: Regular rate and rhythm with normal S1/S2   Abdomen: Soft, non-tender, non-distended with normal bowel sounds. Musculoskeletal: No clubbing, cyanosis or edema bilaterally. Skin: Skin color, texture, turgor normal.  No rashes or lesions. Neurologic:  Neurovascularly intact without any focal sensory/motor deficits.  Cranial nerves: II-XII intact, grossly non-focal.  Psychiatric: Alert and oriented, thought content appropriate, normal insight  Capillary Refill: Brisk,< 3 seconds   Peripheral Pulses: +2 palpable, equal bilaterally       Labs:   Recent Labs     12/03/20  0610 12/04/20  0600 12/05/20  0620   WBC 13.9* 12.2* 15.7*   HGB 14.4 14.1 14.2   HCT 43.0 41.4 42.3    400 396     Recent Labs     12/03/20  0610 12/04/20  0600 12/05/20  0620    139 140   K 4.0 4.2 4.4    104 103   CO2 24 26 26   BUN 23* 27* 29*   CREATININE <0.5* 0.5* 0.6   CALCIUM 9.0 8.9 9.0     Recent Labs     12/02/20  0915 12/03/20  0610 12/05/20  0620   AST 29 24 20   ALT 49* 52* 38   BILIDIR  --   --  <0.2   BILITOT 0.6 0.5 0.6   ALKPHOS 86 85 84     No results for input(s): INR in the last 72 hours. No results for input(s): Tor Oiler in the last 72 hours. Urinalysis:      Lab Results   Component Value Date    NITRU Negative 07/19/2019    BLOODU Negative 07/19/2019    SPECGRAV 1.026 07/19/2019    GLUCOSEU Negative 07/19/2019       Radiology:  XR CHEST PORTABLE   Final Result   Bilateral patchy consolidative opacities suggestive of multifocal infection,   including viral pneumonia. XR CHEST PORTABLE    (Results Pending)           Assessment/Plan:    COVID-19 pneumonia   chest x-ray with bilateral patchy opacities  Continue Decadron 20 mg daily, titrated down to 10 mg daily  Convalescent plasma given  Remdesivir for 5 days  Lovenox twice daily    Acute respiratory failure with hypoxia  Continue to titrate to keep oxygen saturations above 90%  Now on OptiFlow and nonrebreather  Levaquin for 5 days, finished    Hypertension  Continue home medications    Hyperlipidemia  Continue home statin    Morbid Obesity  BMI 41  Counseled on weight loss    DVT Prophylaxis: Lovenox twice daily  Diet: DIET FULL LIQUID;  Dietary Nutrition Supplements: Standard High Calorie Oral Supplement  Code Status: Full Code    PT/OT Eval Status: Not applicable    Dispo -inpatient, overall prognosis is guarded. With her oxygen demand so high and comorbid conditions I am concerned she will require BiPAP and/or intubation.          Nikki Gavin MD

## 2020-12-05 NOTE — PROGRESS NOTES
Pt able to wear 60L O2 while eating meals and hold an O2 saturation of 77-80% while eating. After meals pt has NRB mask on top of 60L Airvo and can maintain saturation of 87%. MD is aware of pt status.

## 2020-12-06 NOTE — PROGRESS NOTES
1600: pt transferred from PCU, pt is on BiPAP saturation in the 90s, Dr Deidra Quispe at the bedside, patient agreeable to intubate. Intubation w/ Dr Deidra Quispe     [phone]: 6mg versed given per Dr Deidra Quispe. 1615:20mg etomidate given per Dr Deidra Quispe. 1616: 50mg rocuronium given per Dr Deidra Quispe. 1616: pt intubated with #7.5 ETT at 22cm lip line. STAT CXR ordered. CXR reviewed ETT in satisfactory position. 1620: fentanyl, propofol, versed gtt infusing via PICC for RASS -5. Levophed started as well. 1758: pt desaturated into 70s and sustaining. Dr Deidra Quispe notified, new orders noted. 1837: pt is proned per Dr Deidra Quispe and oxygen saturation is in the 90s. BP stable, HR stable. Arcos catheter inserted as ordered. Will continue monitor. 1844: Repart STAT CXR reviewed with Dr Deidra Quispe. No new orders. 1900: handoff report given to [facility] DAKOTAH NICHOLE.    Electronically signed by Harjinder Madrigal RN on 12/6/2020 at 7:43 PM

## 2020-12-06 NOTE — PROGRESS NOTES
Received patient from the floor. On bilevel currently. PaO2 in the 40's despite maximal bilevel settings. I explained the rationale and need for intubation. She was upset (understandably so) and wanted to do everything she could to live. I do not feel comfortable allowing her to desatrurate while on bilevel. She finally agreed to be intubated (see procedure note). Arterial line placed (see procedural note) as well. Will keep her heavily sedated as she will likely need weeks on the vent to recover    RASS of -5. May need to paralyze and/or prone if she does not improve    Goal saturation is 88%    Goal pH is 7.2 or greater    Goal PaO2 is 55 or greater    Will give dose of vanco and start cefepime. She wanted an exact chance at her survival.  I could not give her that. She was upset that it came to this, but I cannot risk her sitting in this state overnight. Juliet Bowman DO  [facility] Pulmonary, Sleep and Critical Care  [phone]    Critical care time of 35 minutes (not including procedural time).

## 2020-12-06 NOTE — PROCEDURES
Arterial Catheter Insertion Procedure Note  Consent: Unable to be obtained due to the emergent nature of this procedure. Procedure: Insertion of Arterial Catheter    Indications:   Frequent Blood Gases, Hypotension, Shock    Procedure Details   Informed consent was obtained for the procedure, including sedation. Risks of  hemorrhage, arrhythmia, infection and adverse drug reaction were discussed. Ultrasound used and Right Femoral artery was noted to be patent. Under sterile conditions the skin above the Right Femoral artery was prepped with betadine and covered with a sterile drape with gown, mask, gloves . Perry Confer Local anesthesia was applied to the skin and subcutaneous tissues. A 22-gauge needle was inserted into the Right Femoral artery under active ultrasound guidance (image saved). A guide wire was then passed easily through the catheter which was then advanced with no resistance. Good pulsatile blood returned. The catheter was sutured into place. Findings: There were no changes to vital signs. Patient did tolerate procedure well. Estimated blood loss:  2 ml    Total time of procedure 15 minutes.   [address] [place] [address]

## 2020-12-06 NOTE — PROGRESS NOTES
Hospitalist Progress Note      PCP: VENKAT Britton - CNP    Date of Admission: 11/28/2020    Chief Complaint: Shortness of breath    Hospital Course: 55 yo female admitted with lethargy, sob, tested + for covid 2 weeks ago. She required 4L O2 in the ED. However, since admission overnight she has needed more O2 and is now on vapotherm at 60L O2.     12/1 Patient's oxygen demands are remaining somewhat stable. She is still requiring 60 L along with a nonrebreather. Oxygen sats remain above 90%. Currently the patient is prone. Asked once again if the patient would like me to touch base with any family members and she is refusing. Patient also reiterates that she would not like to go to the ICU or be intubated. 12/2 Still requiring 60L along with NRB. Oxygen dropping with minimal exertion. Discussed once again talking with family and she refused. Stated her DIL is handling everything. 12/4 Patient now requiring BiPAP after moving around in bed. Does pretty well while proning but after that needs increased oxygen amounts. Will call daughter-in-law today    12/5  Patient tolerating the OptiFlow and nonrebreather today. Discussed situation with patient's daughter-in-law yesterday. She is aware of the severity of situation and how close she is to intubation. Subjective: Patient seen and examined. Patient not tolerating the OptiFlow and nonrebreather so she is now on BiPAP. She just ate so her saturations are in the high 70s and low 80s. It will generally take 15 to 20 minutes for her sats to come back up. There is still great concern regarding her overall respiratory status.     Medications:  Reviewed    Infusion Medications   Scheduled Medications    dexamethasone  10 mg Intravenous Q24H    lidocaine 1 % injection  5 mL Intradermal Once    famotidine  20 mg Oral BID    vitamin C  1,000 mg Oral BID    melatonin  6 mg Oral Nightly    thiamine  200 mg Oral Daily    zinc sulfate  50 mg Oral BID    Vitamin D  2,000 Units Oral Daily    sodium chloride flush  10 mL Intravenous 2 times per day    enoxaparin  40 mg Subcutaneous BID     PRN Meds: metoprolol, hydrALAZINE, guaiFENesin-dextromethorphan, sodium chloride, albuterol sulfate HFA, sodium chloride flush, potassium chloride, magnesium sulfate, acetaminophen **OR** acetaminophen, magnesium hydroxide, promethazine **OR** ondansetron, LORazepam      Intake/Output Summary (Last 24 hours) at 12/6/2020 0808  Last data filed at 12/6/2020 0649  Gross per 24 hour   Intake 780 ml   Output 1100 ml   Net -320 ml       Physical Exam Performed:    /85   Pulse 92   Temp 98.2 °F (36.8 °C) (Axillary)   Resp 28   Ht 5' 6\" (1.676 m)   Wt 252 lb 6.8 oz (114.5 kg)   LMP  (LMP Unknown)   SpO2 (!) 88%   BMI 40.74 kg/m²     General appearance: No apparent distress, appears stated age and cooperative, on BiPAP  HEENT: Pupils equal, round, and reactive to light. Conjunctivae/corneas clear. Neck: Supple, with full range of motion. No jugular venous distention. Trachea midline. Respiratory:  Normal respiratory effort. Diminished BS BL  Cardiovascular: Regular rate and rhythm with normal S1/S2   Abdomen: Soft, non-tender, non-distended with normal bowel sounds. Musculoskeletal: No clubbing, cyanosis or edema bilaterally. Skin: Skin color, texture, turgor normal.  No rashes or lesions. Neurologic:  Neurovascularly intact without any focal sensory/motor deficits.  Cranial nerves: II-XII intact, grossly non-focal.  Psychiatric: Alert and oriented, thought content appropriate, normal insight  Capillary Refill: Brisk,< 3 seconds   Peripheral Pulses: +2 palpable, equal bilaterally       Labs:   Recent Labs     12/04/20  0600 12/05/20  0620 12/06/20  0630   WBC 12.2* 15.7* 17.9*   HGB 14.1 14.2 13.3   HCT 41.4 42.3 39.6    396 329     Recent Labs     12/04/20  0600 12/05/20  0620 12/06/20  0630    140 139   K 4.2 4.4 4.6    103 103   CO2 26 26 29   BUN 27* 29* 24*   CREATININE 0.5* 0.6 <0.5*   CALCIUM 8.9 9.0 8.6     Recent Labs     12/05/20  0620 12/06/20  0630   AST 20 20   ALT 38 26   BILIDIR <0.2 <0.2   BILITOT 0.6 0.4   ALKPHOS 84 72     No results for input(s): INR in the last 72 hours. No results for input(s): Luisa Basques in the last 72 hours. Urinalysis:      Lab Results   Component Value Date    NITRU Negative 07/19/2019    BLOODU Negative 07/19/2019    SPECGRAV 1.026 07/19/2019    GLUCOSEU Negative 07/19/2019       Radiology:  XR CHEST PORTABLE   Final Result   Bilateral patchy consolidative opacities suggestive of multifocal infection,   including viral pneumonia. XR CHEST PORTABLE    (Results Pending)           Assessment/Plan:    COVID-19 pneumonia   chest x-ray with bilateral patchy opacities  Continue Decadron 20 mg daily, titrated down to 10 mg daily  Convalescent plasma given  Remdesivir for 5 days  Lovenox twice daily    Acute respiratory failure with hypoxia  Continue to titrate to keep oxygen saturations above 90%  Not tolerating OptiFlow and nonrebreather, requiring BiPAP with marginal oxygenation  Levaquin for 5 days, finished    Hypertension  Continue home medications    Hyperlipidemia  Continue home statin    Morbid Obesity  BMI 41  Counseled on weight loss    DVT Prophylaxis: Lovenox twice daily  Diet: DIET FULL LIQUID;  Dietary Nutrition Supplements: Standard High Calorie Oral Supplement  Code Status: Full Code    PT/OT Eval Status: Not applicable    Dispo -inpatient, overall prognosis is guarded. With her oxygen demand so high and comorbid conditions I am concerned she will require BiPAP and/or intubation.          Bernardo Linton MD

## 2020-12-06 NOTE — PROCEDURES
Intubation Procedure Note    Indication: respiratory failure    Consent: The patient provided verbal consent for this procedure. Procedure:  Sedation: midazolam and etomidate  Paralytic: rocuronium  Equipment: Royer 4 glidescope laryngoscope blade. and 7.5mm cuffed endotracheal tube. View: Grade 3    Procedure:  The cuff was then inflated and the tube was secured appropriately at a distance of 23 cm to the dental ridge    Confirmed by: bilateral breath sounds, an end tidal CO2 detector, absence of sounds over the stomach, tube fogging, adequate chest rise, adequate pulse oximetry reading or improved skin color

## 2020-12-06 NOTE — PROGRESS NOTES
Pt transferred to 2108 on BIPAP. RN set up zoom call with all of pt family before transfer. Family and pt are both up to date on pt plan of care. Bedside report given to ICU RN Gordon Calvin. All questions answered.        Electronically signed by Shane Hernández RN on 12/6/2020 at 4:07 PM

## 2020-12-06 NOTE — PROGRESS NOTES
Pulmonary Progress Note    CC:  Follow up respiratory failure, COVID    Subjective:  On 100% bipap. More hypoxic  Asking if she is ok. SONIA  Says her breathing is ok      Intake/Output Summary (Last 24 hours) at 12/6/2020 1409  Last data filed at 12/6/2020 0649  Gross per 24 hour   Intake 300 ml   Output 1100 ml   Net -800 ml         PHYSICAL EXAM:  Blood pressure (!) 145/77, pulse 103, temperature 98.8 °F (37.1 °C), temperature source Oral, resp. rate 24, height 5' 6\" (1.676 m), weight 252 lb 6.8 oz (114.5 kg), SpO2 (!) 86 %, not currently breastfeeding.'  Gen: Distressed on bpap  Eyes: PERRL. No sclera icterus. No conjunctival injection. ENT: No discharge. Pharynx clear. External appearance of ears and nose normal.  Neck: Trachea midline. No obvious mass. Resp: Diminished, faint crackles  CV: hard to auscultate due to prone   GI: Unable   Skin: Warm, dry, normal texture and turgor. No nodule on exposed extremities. Lymph: No cervical LAD. No supraclavicular LAD. M/S: No cyanosis. No clubbing. No joint deformity. Neuro: Moves all four extremities. CN 2-12 tested, no defect noted.   Ext:   trace edema    Medications:    Scheduled Meds:   dexamethasone  10 mg Intravenous Q24H    lidocaine 1 % injection  5 mL Intradermal Once    famotidine  20 mg Oral BID    vitamin C  1,000 mg Oral BID    melatonin  6 mg Oral Nightly    thiamine  200 mg Oral Daily    zinc sulfate  50 mg Oral BID    Vitamin D  2,000 Units Oral Daily    sodium chloride flush  10 mL Intravenous 2 times per day    enoxaparin  40 mg Subcutaneous BID       Continuous Infusions:      PRN Meds:  metoprolol, hydrALAZINE, guaiFENesin-dextromethorphan, sodium chloride, albuterol sulfate HFA, sodium chloride flush, potassium chloride, magnesium sulfate, acetaminophen **OR** acetaminophen, magnesium hydroxide, promethazine **OR** ondansetron, LORazepam    Labs:  CBC:   Recent Labs     12/04/20  0600 12/05/20  0620 12/06/20  0630   WBC 12.2* 15.7* 17.9*   HGB 14.1 14.2 13.3   HCT 41.4 42.3 39.6   MCV 91.5 92.1 92.8    396 329     BMP:   Recent Labs     12/04/20  0600 12/05/20  0620 12/06/20  0630    140 139   K 4.2 4.4 4.6    103 103   CO2 26 26 29   BUN 27* 29* 24*   CREATININE 0.5* 0.6 <0.5*     LIVER PROFILE:   Recent Labs     12/05/20  0620 12/06/20  0630   AST 20 20   ALT 38 26   BILIDIR <0.2 <0.2   BILITOT 0.6 0.4   ALKPHOS 84 72     PT/INR: No results for input(s): PROTIME, INR in the last 72 hours. APTT:   No results for input(s): APTT in the last 72 hours. UA:No results for input(s): NITRITE, COLORU, PHUR, LABCAST, WBCUA, RBCUA, MUCUS, TRICHOMONAS, YEAST, BACTERIA, CLARITYU, SPECGRAV, LEUKOCYTESUR, UROBILINOGEN, BILIRUBINUR, BLOODU, GLUCOSEU, AMORPHOUS in the last 72 hours. Invalid input(s): [place]  No results for input(s): PH, PCO2, PO2 in the last 72 hours. Films:  Chest imaging reports were reviewed and imaging was reviewed by me and showed no new films to review    ABG:  None    Cultures:  None    I reviewed the labs and images listed above    Assessment:   · Acute Hypoxic Respiratory Failure with saturations less than 90% on room air  · COVID-19 Pneumonia  · ARDS      Plan:  Titrate oxygen for saturations greater than or equal to 88%. On 100% bipap. Check ABG as she has dark nail polish on  Decadron 10 mg to finish 5 days.   This would complete 10 days or more  Lovenox for DVT prophylaxis  Increase activity as saturations tolerate it     Likely needs to be intubated   Will send to the ICU      DO Sly Kauffman [facility]

## 2020-12-07 NOTE — PROGRESS NOTES
Hospitalist Progress Note      PCP: VENKAT Grier - CNP    Date of Admission: 11/28/2020    Chief Complaint: Shortness of breath    Hospital Course: 55 yo female admitted with lethargy, sob, tested + for covid 2 weeks ago. She required 4L O2 in the ED. However, since admission overnight she has needed more O2 and is now on vapotherm at 60L O2. Since admission the patient's oxygen demands have been somewhat labile but for the most part she is requiring 60 L along with a nonrebreather. Patient was on BiPAP on and off along with the OptiFlow. Eventually on December 6 the patient's oxygen saturations were unable to be maintained above 86% and an ABG showed a PaO2 of 48. The decision was made to transfer to the ICU and intubate and paralyzed the patient. Subjective: Patient seen and examined. Patient is currently pronating about to be flat back over. Patient is on 80% FiO2 along with a PEEP of 15. Oxygen saturations are maintaining pretty well.     Medications:  Reviewed    Infusion Medications    fentaNYL 200 mcg/hr (12/07/20 0719)    propofol 40 mcg/kg/min (12/07/20 0650)    midazolam 8 mg/hr (12/07/20 0134)    dextrose      norepinephrine 2 mcg/min (12/07/20 5310)    propofol      cisatracurium (NIMBEX) infusion 1.5 mcg/kg/min (12/07/20 0745)     Scheduled Medications    chlorhexidine  15 mL Mouth/Throat BID    famotidine (PEPCID) injection  20 mg Intravenous BID    insulin lispro  0-6 Units Subcutaneous Q4H    cefepime  2 g Intravenous Q12H    ipratropium-albuterol  1 ampule Inhalation Q4H WA    dexamethasone  10 mg Intravenous Q24H    lidocaine 1 % injection  5 mL Intradermal Once    sodium chloride flush  10 mL Intravenous 2 times per day    enoxaparin  40 mg Subcutaneous BID     PRN Meds: fentanNYL, midazolam, glucose, dextrose, glucagon (rDNA), dextrose, propofol, sodium chloride, sodium chloride flush, potassium chloride, magnesium sulfate, acetaminophen **OR** 07/19/2019       Radiology:  XR CHEST PORTABLE   Final Result   1. Severe pulmonary involvement of COVID-19 and/or sequela related to ARDS or   pulmonary edema. Other infectious etiologies are consideration as well. 2. No gross pneumothorax evident; suboptimal exam for evaluation of   pneumothorax performed prone. 3. Pneumomediastinum, similar in appearance to prior study. 4.  Life support appliances appear appropriately positioned. XR CHEST PORTABLE   Final Result   Extensive bilateral pulmonary infiltrates with progression. Satisfactory position of endotracheal tube. Questionable pleural line overlying the left hilum concerning for   pneumomediastinum. There is also probable subcutaneous emphysema in the   cervical region bilaterally. No significant pneumothorax. Attention on   follow-up imaging. Findings were discussed with Camille Morales at 5:40 p.m. on   12/6/2020. XR CHEST PORTABLE   Final Result   Bilateral patchy consolidative opacities suggestive of multifocal infection,   including viral pneumonia. XR CHEST PORTABLE    (Results Pending)           Assessment/Plan:    COVID-19 pneumonia   chest x-ray with bilateral patchy opacities  Continue Decadron 20 mg daily, titrated down to 10 mg daily  Convalescent plasma given  Remdesivir for 5 days  Lovenox twice daily    Acute respiratory failure with hypoxia  Continue to titrate to keep oxygen saturations above 90%  Intubated on December 6  Levaquin for 5 days, finished  Requiring low-dose pressors but is likely secondary to large amounts of sedation required for intubation and paralytics    Hypertension  Continue home medications    Hyperlipidemia  Continue home statin    Morbid Obesity  BMI 41  Counseled on weight loss    DVT Prophylaxis: Lovenox twice daily  Diet: No diet orders on file  Code Status: Full Code    PT/OT Eval Status: Not applicable    Dispo -inpatient, overall prognosis is guarded. Massiel Muse MD

## 2020-12-07 NOTE — CONSULTS
Comprehensive Nutrition Assessment    Type and Reason for Visit:  Reassess, Consult(Tube Feedings order and management)    Nutrition Recommendations/Plan:   Once able to start TF, recommend: Vital HP with goal rate of 45 mL/hr (~20 hrs run time, adjusted for nrsg care and propofol dose). +1 bottle proteinex daily. Flush per provider. Monitor propofol dose, currently: 27.5 mL/hr    Nutrition Assessment:  Follow-up. Pt respiratory status worsening and was intubated yesterday. Pt remains intubated and paralyzed. Pt currenntly proned. Pressors on board. Consult for TF, will provide rec's until pt no longer proned. Malnutrition Assessment:  Malnutrition Status:  Insufficient data      Estimated Daily Nutrient Needs:  Energy (kcal):  5411-0838 kcal (15-18 kcal/kg ABW)  Protein (g):   gm (1.5-2 gm/kg IBW)  Fluid (ml/day):  1 ml/kcal  Nutrition Related Findings:  trace generalized edema      Wounds:  (incisions to abdomen and vagina but unable to assess per nurse d/t proning)       Current Nutrition Therapies:    No diet orders on file  Current Tube Feeding (TF) Recommendations:  · Goal TF & Flush Orders Provides: When TF able to initiate, recommend: Vital HP with goal rate of 45 mL/hr (~20 hrs run time, adjusted for nrsg care and propofol dose). +1 bottle proteinex daily. Flush per provider. TF regimen provides: 900 mL TV, 900 kcal, 79 gm pro, 752 mL free water (+104 kcal, +26 gm pro from 1 proteinex).     Additional Calorie Sources:   Propofol running @ 27.5 ml/hr providing 726 kcal per day    Anthropometric Measures:  · Height: 5' 6\" (167.6 cm)  · Current Body Weight: 252 lb (114.3 kg)   · Admission Body Weight: 257 lb (116.6 kg)      · Ideal Body Weight: 130 lbs; % Ideal Body Weight 193.8 %   · BMI: 40.7  · BMI Categories: Obese Class 3 (BMI 40.0 or greater)       Nutrition Diagnosis:   · Inadequate oral intake related to impaired respiratory function as evidenced by intubation      Nutrition Interventions:   Food and/or Nutrient Delivery:  (start nutrition when able per MD)  Nutrition Education/Counseling:  No recommendation at this time   Coordination of Nutrition Care:  Continue to monitor while inpatient    Goals:  tolerate most appropriate form of nutrition per MD       Nutrition Monitoring and Evaluation:   Food/Nutrient Intake Outcomes:  (Nutrition advancement)  Physical Signs/Symptoms Outcomes:  Biochemical Data, Hemodynamic Status, Nutrition Focused Physical Findings, Skin, Weight     Discharge Planning:     Too soon to determine     Electronically signed by Nimesh Ramirez RD, LD on 12/7/20 at 10:36 AM EST    Contact: [phone]

## 2020-12-07 NOTE — PROGRESS NOTES
Pulmonary Progress Note    CC: Acute hypoxic respiratory failure  COVID-19 pneumonia  ARDS  Shock  Elevated LFTs  Leukocytosis  Hyperglycemia    24 hr events: On mechanical ventilation with FiO2 of 90% and PEEP of 15. Paralyzed with Nimbex. On levophed. Had 825cc of urine output in the past 24 hours. PHYSICAL EXAM:  Blood pressure (!) 109/55, pulse 67, temperature 97.8 °F (36.6 °C), temperature source Axillary, resp. rate 16, height 5' 6\" (1.676 m), weight 252 lb 3.3 oz (114.4 kg), SpO2 97 %, not currently breastfeeding. On VC+ 16/400/90%/15    Intake/Output Summary (Last 24 hours) at 12/7/2020 0745  Last data filed at 12/7/2020 2768  Gross per 24 hour   Intake 1049.96 ml   Output 825 ml   Net 224.96 ml       Gen: Well developed; well nourished  Eyes: No scleral icterus. No conjunctival injection. ENT: Oropharynx with ETT. External appearance of ears and nose normal.  Neck: Trachea midline. No obvious mass. No visible thyroid enlargement    Respiratory: Clear to auscultation bilaterally, no accessory muscle use  Cardiovascular: Regular rate and rhythm, no appreciable murmurs. No edema. Skin: Warm and dry. No rashes or ulcers on visible areas. Normal texture and turgor  Musculoskeletal: No cyanosis, clubbing or joint deformity.     Psychiatric: Intubated, sedated and paralyzed    Medications:  Current Facility-Administered Medications: fentaNYL 10 mcg/mL infusion, 50 mcg/hr, Intravenous, Continuous  chlorhexidine (PERIDEX) 0.12 % solution 15 mL, 15 mL, Mouth/Throat, BID  famotidine (PEPCID) injection 20 mg, 20 mg, Intravenous, BID  propofol injection, 10 mcg/kg/min, Intravenous, Titrated  midazolam (VERSED) 100 mg in dextrose 5 % 100 mL infusion, 1 mg/hr, Intravenous, Continuous  fentaNYL (SUBLIMAZE) injection 25 mcg, 25 mcg, Intravenous, Q2H PRN  midazolam (VERSED) injection 2 mg, 2 mg, Intravenous, Q2H PRN  insulin lispro (HUMALOG) injection vial 0-6 Units, 0-6 Units, Subcutaneous, Q4H  glucose (GLUTOSE) 40 % oral gel 15 g, 15 g, Oral, PRN  dextrose 50 % IV solution, 12.5 g, Intravenous, PRN  glucagon (rDNA) injection 1 mg, 1 mg, Intramuscular, PRN  dextrose 5 % solution, 100 mL/hr, Intravenous, PRN  cefepime (MAXIPIME) 2 g IVPB minibag, 2 g, Intravenous, Q12H  norepinephrine (LEVOPHED) 16 mg in dextrose 5% 250 mL infusion, 2 mcg/min, Intravenous, Continuous  propofol injection 120 mg, 12 mL, Intravenous, Continuous PRN  ipratropium-albuterol (DUONEB) nebulizer solution 1 ampule, 1 ampule, Inhalation, Q4H WA  cisatracurium besylate (NIMBEX) 200 mg in sodium chloride 0.9 % 100 mL infusion, 2 mcg/kg/min, Intravenous, Continuous  dexamethasone (PF) (DECADRON) injection 10 mg, 10 mg, Intravenous, Q24H  lidocaine 1 % injection 5 mL, 5 mL, Intradermal, Once  0.9 % sodium chloride bolus, 30 mL, Intravenous, PRN  sodium chloride flush 0.9 % injection 10 mL, 10 mL, Intravenous, 2 times per day  sodium chloride flush 0.9 % injection 10 mL, 10 mL, Intravenous, PRN  potassium chloride 10 mEq/100 mL IVPB (Peripheral Line), 10 mEq, Intravenous, PRN  magnesium sulfate 2 g in 50 mL IVPB premix, 2 g, Intravenous, PRN  acetaminophen (TYLENOL) tablet 650 mg, 650 mg, Oral, Q6H PRN **OR** acetaminophen (TYLENOL) suppository 650 mg, 650 mg, Rectal, Q6H PRN  magnesium hydroxide (MILK OF MAGNESIA) 400 MG/5ML suspension 30 mL, 30 mL, Oral, Daily PRN  promethazine (PHENERGAN) tablet 12.5 mg, 12.5 mg, Oral, Q6H PRN **OR** ondansetron (ZOFRAN) injection 4 mg, 4 mg, Intravenous, Q6H PRN  enoxaparin (LOVENOX) injection 40 mg, 40 mg, Subcutaneous, BID    Data reviewed:  Labs:  CBC:   Recent Labs     12/05/20  0620 12/06/20  0630 12/07/20  0522   WBC 15.7* 17.9* 19.0*   HGB 14.2 13.3 12.0   HCT 42.3 39.6 36.0   MCV 92.1 92.8 92.7    329 307     BMP:   Recent Labs     12/05/20  0620 12/06/20  0630 12/07/20  0522    139 136   K 4.4 4.6 4.8    103 102   CO2 26 29 27   PHOS  --   --  3.5   BUN 29* 24* 20   CREATININE 0.6 <0.5* 0.6 LIVER PROFILE:   Recent Labs     20  0620 20  0630 20  0522   AST 20 20 17   ALT 38 26 32   BILIDIR <0.2 <0.2 <0.2   BILITOT 0.6 0.4 0.3   ALKPHOS 84 72 76     PT/INR: No results for input(s): PROTIME, INR in the last 72 hours. APTT:   No results for input(s): APTT in the last 72 hours. AB/7- 7.29//126 (on 100%)    Cultures:  Nasal MRSA probe: Pending  Sputum culture: Pending     Films:  Chest images and reports were reviewed by me. My interpretation is:  CXR (20): Diffuse bilateral infiltrates; ETT and PICC in place    Assessment:     Acute hypoxic respiratory failure  COVID-19 pneumonia  ARDS  Shock  Elevated LFTs  Leukocytosis  Hyperglycemia    Plan:    Acute hypoxic respiratory failure  -Due to COVID-19 pneumonia  -On mechanical ventilation and paralyzed. Wean FiO2 and PEEP as able to keep oxygen saturation greater than 90%    COVID-19 pneumonia  -Received 5 days of Decadron 20 mg daily. On Decadron 10 mg daily (day #3)  -Completed 5 days of remdesivir  -Status post convalescent plasma on     ARDS  -Continue mechanical ventilation  -Continue paralytics    Shock  -May be due to sedation, but will check lactic acid  -Levophed to keep MAP greater than 60    Elevated LFTs  -Resolved     Leukocytosis  -May be due to steroids, but given increasing WBCs will continue cefepime for now  -Follow-up sputum culture and nasal MRSA probe   -Send blood cultures    Hyperglycemia  -Sliding scale insulin      Prophylaxis  DVT- lovenox  GI- pepcid  VAP- peridex    I spent 45 minutes of critical care time with this patient excluding procedures.     MD Jenelle Boles Pulmonary, Critical Care and Sleep

## 2020-12-07 NOTE — PROGRESS NOTES
0700: Handoff from [facility]. Bedside medication verification and skin assessment complete. Patient remains synchronous on ventilator with paralytic, unrestrained. Train of four being tested on left eyebrow. 0745: Assessment complete, see flowsheet. No change in paralytic per order. 9738: Attempt to wean down levophed, patient MAP in 50's. Levophed turned back on to previous rate. 0845: Train of four (TOF) complete, 1/4 twitches, no change in nimbex gtt. 1036: Rounds with Dr. Darlyn Marin complete. Plan to place patient supine at 1300. TOF complete, 2/4 twitches, no change in nimbex gtt.  1300: Patient placed in supine position with 4 RN's and RT at bedside. SPO2 97% after position change, decreasing down to 94% with in 7 minutes. Blood pressure low, will titrate pressors  1420: Dr. Darlyn Marin aware of new positioning, no new orders. 2150: Updates given to Isi Rabago, patient's daughter in law. All questions answered at this time. 2245: Handoff with DAYANARA Mg. Bedside medication verification complete and TOF complete.

## 2020-12-07 NOTE — PLAN OF CARE
Problem: Gas Exchange - Impaired  Goal: Absence of hypoxia  Outcome: Ongoing     Problem: Gas Exchange - Impaired  Goal: Promote optimal lung function  Outcome: Ongoing     Problem: Skin Integrity:  Goal: Absence of new skin breakdown  Description: Absence of new skin breakdown  Outcome: Ongoing     Problem: Nutrition  Goal: Optimal nutrition therapy  Outcome: Ongoing

## 2020-12-07 NOTE — PLAN OF CARE
Problem: Nutrition  Goal: Optimal nutrition therapy  12/7/2020 1040 by Rajwinder Farrar RD, LD  Outcome: Ongoing  12/7/2020 0535 by Edwardo Waters RN  Outcome: Ongoing   Nutrition Problem #1: Inadequate oral intake  Intervention: Food and/or Nutrient Delivery: (start nutrition when able per MD)  Nutritional Goals: tolerate most appropriate form of nutrition per MD

## 2020-12-08 NOTE — PROGRESS NOTES
Q2H PRN  midazolam (VERSED) injection 2 mg, 2 mg, Intravenous, Q2H PRN  glucose (GLUTOSE) 40 % oral gel 15 g, 15 g, Oral, PRN  dextrose 50 % IV solution, 12.5 g, Intravenous, PRN  glucagon (rDNA) injection 1 mg, 1 mg, Intramuscular, PRN  dextrose 5 % solution, 100 mL/hr, Intravenous, PRN  cefepime (MAXIPIME) 2 g IVPB minibag, 2 g, Intravenous, Q12H  norepinephrine (LEVOPHED) 16 mg in dextrose 5% 250 mL infusion, 2 mcg/min, Intravenous, Continuous  propofol injection 120 mg, 12 mL, Intravenous, Continuous PRN  cisatracurium besylate (NIMBEX) 200 mg in sodium chloride 0.9 % 100 mL infusion, 2 mcg/kg/min, Intravenous, Continuous  dexamethasone (PF) (DECADRON) injection 10 mg, 10 mg, Intravenous, Q24H  lidocaine 1 % injection 5 mL, 5 mL, Intradermal, Once  0.9 % sodium chloride bolus, 30 mL, Intravenous, PRN  sodium chloride flush 0.9 % injection 10 mL, 10 mL, Intravenous, 2 times per day  sodium chloride flush 0.9 % injection 10 mL, 10 mL, Intravenous, PRN  potassium chloride 10 mEq/100 mL IVPB (Peripheral Line), 10 mEq, Intravenous, PRN  magnesium sulfate 2 g in 50 mL IVPB premix, 2 g, Intravenous, PRN  acetaminophen (TYLENOL) tablet 650 mg, 650 mg, Oral, Q6H PRN **OR** acetaminophen (TYLENOL) suppository 650 mg, 650 mg, Rectal, Q6H PRN  magnesium hydroxide (MILK OF MAGNESIA) 400 MG/5ML suspension 30 mL, 30 mL, Oral, Daily PRN  promethazine (PHENERGAN) tablet 12.5 mg, 12.5 mg, Oral, Q6H PRN **OR** ondansetron (ZOFRAN) injection 4 mg, 4 mg, Intravenous, Q6H PRN  enoxaparin (LOVENOX) injection 40 mg, 40 mg, Subcutaneous, BID    Data reviewed:  Labs:  CBC:   Recent Labs     12/06/20  0630 12/07/20  0522 12/08/20  0442   WBC 17.9* 19.0* 16.6*   HGB 13.3 12.0 11.8*   HCT 39.6 36.0 36.6   MCV 92.8 92.7 95.4    307 331     BMP:   Recent Labs     12/06/20  0630 12/07/20  0522 12/08/20  0442    136 136   K 4.6 4.8 5.4*    102 101   CO2 29 27 25   PHOS  --  3.5  --    BUN 24* 20 43*   CREATININE <0.5* 0.6 1.7*     LIVER PROFILE:   Recent Labs     20  0630 20  0522 20  0442   AST 20 17 16   ALT 26 32 31   BILIDIR <0.2 <0.2 <0.2   BILITOT 0.4 0.3 0.3   ALKPHOS 72 76 70     PT/INR: No results for input(s): PROTIME, INR in the last 72 hours. APTT:   No results for input(s): APTT in the last 72 hours. AB/7- 7.29/61/126 (on 100%)  - 7.25/64/105 (on 80%)    Cultures:  Blood culture (): Pending   Nasal MRSA probe: Pending  Sputum culture: Pending     Films:  Chest images and reports were reviewed by me. My interpretation is:  CXR (20): Diffuse bilateral infiltrates; ETT and PICC in place    Assessment:     Acute hypoxic respiratory failure  COVID-19 pneumonia  ARDS  Shock  Elevated LFTs  Leukocytosis  Hyperglycemia  Acute kidney injury    Plan:    Acute hypoxic respiratory failure  -Due to COVID-19 pneumonia  -On mechanical ventilation and paralyzed. Wean FiO2 and PEEP as able to keep oxygen saturation greater than 90%    COVID-19 pneumonia  -Received 5 days of Decadron 20 mg daily. On Decadron 10 mg daily (day #4)  -Completed 5 days of remdesivir  -Status post convalescent plasma on     ARDS  -Continue mechanical ventilation  -Goal is to wean paralytics off today     Shock  -Likely due to sedation   -Levophed to keep MAP greater than 60    Elevated LFTs  -Resolved     Leukocytosis  -May be due to steroids, but given increasing WBCs will continue cefepime for now (day #3)  -Follow culture data     Hyperglycemia  -Sliding scale insulin    Acute kidney injury  -Bolus 1L NS  -Consult nephrology      Prophylaxis  DVT- lovenox  GI- pepcid  VAP- peridex    I spent 40 minutes of critical care time with this patient excluding procedures.     Claudio Kemp MD  [facility] Pulmonary, Critical Care and Sleep

## 2020-12-08 NOTE — PROGRESS NOTES
Hospitalist Progress Note      PCP: VENKAT Gee - CNP    Date of Admission: 11/28/2020    Chief Complaint: Shortness of breath    Hospital Course: 57 yo female admitted with lethargy, sob, tested + for covid 2 weeks ago. She required 4L O2 in the ED. However, since admission overnight she has needed more O2 and is now on vapotherm at 60L O2. Since admission the patient's oxygen demands have been somewhat labile but for the most part she is requiring 60 L along with a nonrebreather. Patient was on BiPAP on and off along with the OptiFlow. Eventually on December 6 the patient's oxygen saturations were unable to be maintained above 86% and an ABG showed a PaO2 of 48. The decision was made to transfer to the ICU and intubate and paralyzed the patient. Subjective: Patient seen and examined. Patient currently lying on her back maintaining good oxygen with FiO2 of 80% and a PEEP of 15. Medications:  Reviewed    Infusion Medications    fentaNYL 200 mcg/hr (12/08/20 0625)    propofol 40 mcg/kg/min (12/08/20 0737)    midazolam 8 mg/hr (12/08/20 9237)    dextrose      norepinephrine 4 mcg/min (12/08/20 0520)    propofol      cisatracurium (NIMBEX) infusion 1.5 mcg/kg/min (12/08/20 2714)     Scheduled Medications    sodium chloride  1,000 mL Intravenous Once    insulin lispro  0-12 Units Subcutaneous Q4H    lubrifresh P.M.    Both Eyes 6 times per day    chlorhexidine  15 mL Mouth/Throat BID    famotidine (PEPCID) injection  20 mg Intravenous BID    cefepime  2 g Intravenous Q12H    dexamethasone  10 mg Intravenous Q24H    lidocaine 1 % injection  5 mL Intradermal Once    sodium chloride flush  10 mL Intravenous 2 times per day    enoxaparin  40 mg Subcutaneous BID     PRN Meds: fentanNYL, midazolam, glucose, dextrose, glucagon (rDNA), dextrose, propofol, sodium chloride, sodium chloride flush, potassium chloride, magnesium sulfate, acetaminophen **OR** acetaminophen, magnesium hydroxide, promethazine **OR** ondansetron      Intake/Output Summary (Last 24 hours) at 12/8/2020 0825  Last data filed at 12/8/2020 0801  Gross per 24 hour   Intake 1511.8 ml   Output 925 ml   Net 586.8 ml       Physical Exam Performed:    BP (!) 109/56   Pulse 76   Temp 99.4 °F (37.4 °C) (Axillary)   Resp 16   Ht 5' 6\" (1.676 m)   Wt 250 lb 3.6 oz (113.5 kg)   LMP  (LMP Unknown)   SpO2 96%   BMI 40.39 kg/m²     General appearance: Intubated and sedated  HEENT: Pupils equal, round, and reactive to light. Conjunctivae/corneas clear. Neck: Supple, with full range of motion. No jugular venous distention. Trachea midline. Respiratory: Diminished breath sounds bilateral  Cardiovascular: Normal S1-S2  Abdomen: Soft, non-tender, non-distended with normal bowel sounds. Musculoskeletal: No clubbing, cyanosis or edema bilaterally. Skin: Skin color, texture, turgor normal.  No rashes or lesions. Neurologic:  Intubated and sedated  Psychiatric: Intubated and sedated  Capillary Refill: Brisk,< 3 seconds   Peripheral Pulses: +2 palpable, equal bilaterally       Labs:   Recent Labs     12/06/20 0630 12/07/20 0522 12/08/20 0442   WBC 17.9* 19.0* 16.6*   HGB 13.3 12.0 11.8*   HCT 39.6 36.0 36.6    307 331     Recent Labs     12/06/20 0630 12/07/20 0522 12/08/20 0442    136 136   K 4.6 4.8 5.4*    102 101   CO2 29 27 25   BUN 24* 20 43*   CREATININE <0.5* 0.6 1.7*   CALCIUM 8.6 8.2* 8.6   PHOS  --  3.5  --      Recent Labs     12/06/20 0630 12/07/20  0522 12/08/20 0442   AST 20 17 16   ALT 26 32 31   BILIDIR <0.2 <0.2 <0.2   BILITOT 0.4 0.3 0.3   ALKPHOS 72 76 70     No results for input(s): INR in the last 72 hours. No results for input(s): Noni Belts in the last 72 hours.     Urinalysis:      Lab Results   Component Value Date    NITRU Negative 07/19/2019    BLOODU Negative 07/19/2019    SPECGRAV 1.026 07/19/2019    GLUCOSEU Negative 07/19/2019       Radiology:  XR CHEST PORTABLE Final Result   1. Severe pulmonary involvement of COVID-19 and/or sequela related to ARDS or   pulmonary edema. Other infectious etiologies are consideration as well. 2. No gross pneumothorax evident; suboptimal exam for evaluation of   pneumothorax performed prone. 3. Pneumomediastinum, similar in appearance to prior study. 4.  Life support appliances appear appropriately positioned. XR CHEST PORTABLE   Final Result   Extensive bilateral pulmonary infiltrates with progression. Satisfactory position of endotracheal tube. Questionable pleural line overlying the left hilum concerning for   pneumomediastinum. There is also probable subcutaneous emphysema in the   cervical region bilaterally. No significant pneumothorax. Attention on   follow-up imaging. Findings were discussed with Bautista Page at 5:40 p.m. on   12/6/2020. XR CHEST PORTABLE   Final Result   Bilateral patchy consolidative opacities suggestive of multifocal infection,   including viral pneumonia. XR CHEST PORTABLE    (Results Pending)           Assessment/Plan:    COVID-19 pneumonia   chest x-ray with bilateral patchy opacities  Continue Decadron 20 mg daily, titrated down to 10 mg daily  Convalescent plasma given  Remdesivir for 5 days  Lovenox twice daily    Acute respiratory failure with hypoxia  Continue to titrate to keep oxygen saturations above 90%  Continue with a PEEP of 15 and FiO2 80%  Intubated on December 6  Levaquin for 5 days, finished  Requiring low-dose pressors but is likely secondary to large amounts of sedation required for intubation and paralytics    Hypertension  Continue home medications    Hyperlipidemia  Continue home statin    Morbid Obesity  BMI 41  Counseled on weight loss    DVT Prophylaxis: Lovenox twice daily  Diet: Diet NPO Effective Now  Code Status: Full Code    PT/OT Eval Status: Not applicable    Dispo -inpatient, overall prognosis is guarded.      Bright Cantu Eden Camera, MD

## 2020-12-08 NOTE — CONSULTS
Nutrition Note    Consult for \"Tube feedings order and management\". Pt back supine. Starting TF. Recommend: Vital HP with goal rate of 45 mL/hr (~20 hrs run time, adjusted for nrsg care and propofol dose). +1 bottle proteinex daily. Flush per provider. Propofol running @ 27.5 mL/hr providing 726 kcal per day.     Electronically signed by General Olivier RD, ALYSE on 12/8/20 at 10:19 AM EST    Contact: [phone]

## 2020-12-08 NOTE — PLAN OF CARE
Problem: Airway Clearance - Ineffective  Goal: Achieve or maintain patent airway  Outcome: Ongoing     Problem: Gas Exchange - Impaired  Goal: Absence of hypoxia  Outcome: Ongoing     Problem: Gas Exchange - Impaired  Goal: Promote optimal lung function  Outcome: Ongoing     Problem:  Body Temperature -  Risk of, Imbalanced  Goal: Ability to maintain a body temperature within defined limits  Outcome: Ongoing     Problem: Nutrition Deficits  Goal: Optimize nutrtional status  Outcome: Ongoing     Problem: Skin Integrity:  Goal: Absence of new skin breakdown  Description: Absence of new skin breakdown  Outcome: Ongoing

## 2020-12-08 NOTE — CONSULTS
[facility]  Consult Note           Reason for Consult:  CLEMENT Hyperkalemia  Requesting Physician:  Dr. Dora Syed    History Obtained From:   [facility] record    History of Present Ilness:    57 y/o WF with h/o HTN Obesity admitted on  with c/o SOB and fatigue She had exposure to Covid 2 weeks prior to admission and has had symptoms for a week prior to admission  She had worsening oxygenation and increased O2 requirements and was intubated on 20  Her Cr had been normal until today when it was noted to have increased from 0.6 mg to 1.7 mg  Her UOP was noted to have been declining overnight  She received fluid bolus this AM Is on low dose Levophed  No exposure to IV Contrast or NSAID's  Also noted to have K= of 5.7 meq    Past Medical History:        Diagnosis Date    History of bronchitis     Hyperlipidemia     Hypertension     PONV (postoperative nausea and vomiting)        Past Surgical History:        Procedure Laterality Date     SECTION      CHOLECYSTECTOMY      DILATION AND CURETTAGE OF UTERUS N/A 2019    HYSTEROSCOPY DILATION AND CURETTAGE performed by Iliana Britt MD at [address] [place] [address] Bilateral 2019    ROBOTIC HYSTERECTOMY, BILATERAL SALPINGO OOPHORECTOMY, SENTINEL LYMPH NODE BIOPSY, LYMPH NODE DISSECTION performed by Keeley Porter MD at [address] [place] [address] Medications:    No current facility-administered medications on file prior to encounter. Current Outpatient Medications on File Prior to Encounter   Medication Sig Dispense Refill    LORazepam (ATIVAN) 0.5 MG tablet Take 0.5 mg by mouth 2 times daily as needed.  losartan (COZAAR) 25 MG tablet Take 75 mg by mouth daily Patient has 50 & 25mg tabs at home.       aspirin 81 MG EC tablet Take 81 mg by mouth daily       ezetimibe (ZETIA) 10 MG tablet Take 10 mg by mouth daily       atorvastatin (LIPITOR) 10 MG tablet Take 10 mg by mouth nightly          Allergies:  Patient has no known allergies. Social History:    Social History     Socioeconomic History    Marital status:      Spouse name: Not on file    Number of children: Not on file    Years of education: Not on file    Highest education level: Not on file   Occupational History    Not on file   Social Needs    Financial resource strain: Not on file    Food insecurity     Worry: Not on file     Inability: Not on file    Transportation needs     Medical: Not on file     Non-medical: Not on file   Tobacco Use    Smoking status: Never Smoker    Smokeless tobacco: Never Used   Substance and Sexual Activity    Alcohol use: Yes     Comment: socially    Drug use: Never    Sexual activity: Not Currently   Lifestyle    Physical activity     Days per week: Not on file     Minutes per session: Not on file    Stress: Not on file   Relationships    Social connections     Talks on phone: Not on file     Gets together: Not on file     Attends [employer]ism service: Not on file     Active member of club or organization: Not on file     Attends meetings of clubs or organizations: Not on file     Relationship status: Not on file    Intimate partner violence     Fear of current or ex partner: Not on file     Emotionally abused: Not on file     Physically abused: Not on file     Forced sexual activity: Not on file   Other Topics Concern    Not on file   Social History Narrative    Not on file       Family History:   Family History   Problem Relation Age of Onset    Heart Attack Mother     Heart Disease Mother     Heart Attack Father     Stroke Father     Early Death Brother     Stroke Brother        Review of Systems:   Pertinent positives stated above in HPI. All other systems were reviewed and were negative.     Physical exam:   Constitutional:  VITALS:  BP (!) 103/51   Pulse 71   Temp 98.4 °F (36.9 °C) (Axillary)   Resp 16   Ht 5' 6\" (1.676 m)   Wt 250 lb 3.6 oz (113.5 kg)   LMP  (LMP Unknown)   SpO2 93%   BMI 40.39 kg/m²   Gen: intubated on vent obese  Skin: no rashes  Heent: ETT in place  Neck: no bruits or jvd noted, thyroid normal  Cardiovascular:  S1, S2 without m/r/g  Respiratory: CTA anteriorly  Abdomen:  +bs, soft, nt, nd, no hepatosplenomegaly Foly in place  Ext: no lower extremity edema  Psychiatric:sedated on vent      Data/  CBC:   Lab Results   Component Value Date    WBC 16.6 12/08/2020    RBC 3.84 12/08/2020    HGB 11.8 12/08/2020    HCT 36.6 12/08/2020    MCV 95.4 12/08/2020    MCH 30.7 12/08/2020    MCHC 32.2 12/08/2020    RDW 12.8 12/08/2020     12/08/2020    MPV 8.5 12/08/2020     BMP:    Lab Results   Component Value Date     12/08/2020    K 5.4 12/08/2020    K 5.4 12/08/2020     12/08/2020    CO2 24 12/08/2020    BUN 45 12/08/2020    LABALBU 2.3 12/08/2020    CREATININE 1.7 12/08/2020    CALCIUM 8.1 12/08/2020    GFRAA 37 12/08/2020    LABGLOM 30 12/08/2020    GLUCOSE 156 12/08/2020         Assessment/  1-CLEMENT Cr increased to 1.7 mg from 0.6 mg Typically reflective of ATN but has been in negative fluid balance so prerenal element cannot be excluded  R/O Rhabdomyolysis U/A pending  2-Hyperkalemia mild in the setting of CLEMENT   3-Ac resp failure s/p intubation 12/6/20  4 Shock on Levophed  5 Covid Pneumonia with ARDS  Inflammatory markers worsening,  on Decadron completed Remdesivir and CP  6 Hyperglycemia    Plan/  1-Start gentle IVF's  2-Continue BP support maintain MAP > 60 mm  3-K= stable at 5.4 meq on recheck Change TF formula to Nepro  4 Check CPK, Urine and urine Na+  5 Monitor UOP and renal function closely High risk for  Progression of  CLEMENT and need for RRT     Thank you for the consultation. Please do not hesitate to call with questions.     John Nair MD, [address]

## 2020-12-08 NOTE — PROGRESS NOTES
Nutrition Note    Changing TF to Nepro. Propofol running @ 27.5 mL/hr providing 726 kcal.    New TF order, Nepro @ 25mL/hr +2 proteinex daily. Flush per provider.     TF regimen provides:   500 mL TV 1108 kcal 93 gm pro 364 mL free water       Electronically signed by Merlyn Lund RD, LD on 12/8/20 at 12:31 PM EST    Contact: [phone]

## 2020-12-08 NOTE — PROGRESS NOTES
0700: handoff from Saurav, [facility]. Bedside medication verification complete. 9982: [facility] to [facility] for nephrology consult. 1000: Round with Dr. Pardeep Chapman complete. Will wean nimbex gtt, see MAR.  1133: Nimbex gtt stopped. TOF 4/4. OGT placed at 71 Lip line, will verify with CXR before starting tube feedings. Restraints placed after paralytic stopped. Will continue to keep RASS -5.  1230: Dr. Carlota Little changing TF orders, [facility], dietitian aware. 1411: Spoke with Maame Maldonado, patient's daughter in law. Updates and plan of care given. 1900: Handoff given to DAYANARA Mg. Bedside medication check and skin assessment complete.

## 2020-12-09 NOTE — PROGRESS NOTES
Pt desaturating into the 60's. Pt removed from vent and bagged until sats 96%. Increased Fio2 on vent to 100%.

## 2020-12-09 NOTE — PROGRESS NOTES
Arcos catheter was removed and replaced in order to obtain a urine culture per [facility] policy.    Electronically signed by Gerardo Eller RN on 12/9/2020 at 5:40 PM

## 2020-12-09 NOTE — PROGRESS NOTES
[facility]  Nephrology   Progress Note        We are following the patient for CLEMENT Hyperkalemia  55 y/o WF with h/o HTN Obesity admitted on  with c/o SOB and fatigue She had exposure to Covid 2 weeks prior to admission and has had symptoms for a week prior to admission  She had worsening oxygenation and increased O2 requirements and was intubated on 20  Her Cr had been normal until today when it was noted to have increased from 0.6 mg to 1.7 mg  SUBJECTIVE:  Intubated 80% Fio2  Off of Levophed  Adequate UOP last 24 hours  K= 5.5 meq today   Has had low grade fever    OBJECTIVE:     PHYSICAL:    TEMPERATURE:  Current - Temp: 98.3 °F (36.8 °C);  Max - Temp  Av.6 °F (37.6 °C)  Min: 98.3 °F (36.8 °C)  Max: 100.5 °F (38.1 °C)  RESPIRATIONS RANGE: Resp  Av.2  Min: 15  Max: 22  PULSE RANGE: Pulse  Av.3  Min: 53  Max: 77  BLOOD PRESSURE RANGE:  Systolic (29GVD), DDD:432 , Min:103 , MQW:745   ; Diastolic (00PRN), PKT:65, Min:46, Max:67    PULSE OXIMETRY RANGE: SpO2  Av.1 %  Min: 88 %  Max: 97 %  24HR INTAKE/OUTPUT:      Intake/Output Summary (Last 24 hours) at 2020 1232  Last data filed at 2020 0931  Gross per 24 hour   Intake 1702.53 ml   Output 1040 ml   Net 662.53 ml       CONSTITUTIONAL: intubated on vent  HEENT:  ETT in place  NECK:  supple, symmetrical, trachea midline  LUNGS:  Clear to auscultation anteriorly  CARDIOVASCULAR:  Normal apical impulse, regular rate and rhythm, normal S1 and S2  ABDOMEN:  No scars, normal bowel sounds, soft, non-distended, non-tender, no masses palpated, no hepatosplenomegally  NEUROLOGIC: intubated sedated  SKIN: no bruising or bleeding  EXTREMITIES: trace to 1+ bilateral pedal edema    Medications     sodium chloride 50 mL/hr at 20 0950    fentaNYL 200 mcg/hr (20 1211)    propofol 40 mcg/kg/min (20 1205)    midazolam 8 mg/hr (20 0634)    dextrose      norepinephrine Stopped (20 1040)    propofol          Data      CBC:   Recent Labs     12/07/20  0522 12/08/20  0442 12/09/20  0555   WBC 19.0* 16.6* 18.1*   RBC 3.89* 3.84* 3.65*   HGB 12.0 11.8* 11.2*   HCT 36.0 36.6 35.1*    331 296     BMP:    Recent Labs     12/08/20  0442 12/08/20  1112 12/09/20  0555    134* 141   K 5.4* 5.4* 5.7*    102 109   CO2 25 24 24   BUN 43* 45* 67*   CREATININE 1.7* 1.7* 2.0*   CALCIUM 8.6 8.1* 8.0*   GLUCOSE 202* 156* 146*     Phosphorus:    Recent Labs     12/07/20  0522   PHOS 3.5     Magnesium:    Recent Labs     12/07/20  0522   MG 2.30         ASSESSMENT     Patient Active Problem List   Diagnosis    Postmenopausal bleeding    Complex atypical endometrial hyperplasia    COVID-19    Acute respiratory failure with hypoxia (HCC)    Pneumonia due to COVID-19 virus    Elevated LFTs    Leukocytosis    ARDS (adult respiratory distress syndrome) (HCC)    Shock (HCC)    Hyperglycemia    CLEMENT [facility]acute kidney injury) [facility] [address]    Fever       PLAN    1-CLEMENT Cr increased to 1.7 mg from 0.6 mg and now 2 mg CPK OK U/A shows granular casts Suspect ATN Non oliguric Rate of rise of Cr seems to be slowing down Continue gentle IVF's and monitor  2-Hyperkalemia mild in the setting of CLEMENT TF changed to Nepro K= 5.5 meq Kayexalate given Recheck this PM   3-Ac resp failure s/p intubation 12/6/20  4 Shock improving off of Levophed  5 Covid Pneumonia with ARDS  Inflammatory markers worsening,  on Decadron completed Remdesivir and CP  6 Fever  On Cefepime BC negative so far Check urine CX     Gagan Edwadrs MD, [address]

## 2020-12-09 NOTE — PROGRESS NOTES
Pulmonary Progress Note    CC: Acute hypoxic respiratory failure  COVID-19 pneumonia  ARDS  Shock  Elevated LFTs  Leukocytosis  Hyperglycemia  Acute kidney injury  Fever    24 hr events:  Weaned off nimbex yesterday. Desaturated to the 60s overnight. Patient was bagged and FiO2 increased to 100%. Febrile to 100.5. Had 1L of urine output in the past 24 hours. PHYSICAL EXAM:  Blood pressure (!) 123/52, pulse 57, temperature 100.1 °F (37.8 °C), temperature source Axillary, resp. rate 16, height 5' 6\" (1.676 m), weight 252 lb 6.8 oz (114.5 kg), SpO2 96 %, not currently breastfeeding. On VC+ 16/400/80%/15, Ti-1.5secs    Intake/Output Summary (Last 24 hours) at 12/9/2020 0750  Last data filed at 12/9/2020 0306  Gross per 24 hour   Intake 2214.53 ml   Output 1070 ml   Net 1144.53 ml       Gen: Well developed; well nourished  Eyes: No scleral icterus. No conjunctival injection. ENT: Oropharynx with ETT. External appearance of ears and nose normal.  Neck: Trachea midline. No obvious mass. No visible thyroid enlargement    Respiratory: Clear to auscultation bilaterally on anterior exam, no accessory muscle use  Cardiovascular: Regular rate and rhythm, no appreciable murmurs. No edema. Skin: Warm and dry. No rashes or ulcers on visible areas. Normal texture and turgor  Musculoskeletal: No cyanosis, clubbing or joint deformity.     Psychiatric: Intubated and sedated    Medications:  Current Facility-Administered Medications: 0.9 % sodium chloride infusion, , Intravenous, Continuous  insulin lispro (HUMALOG) injection vial 0-12 Units, 0-12 Units, Subcutaneous, Q4H  lubrifresh P.M. (artificial tears) ophthalmic ointment, , Both Eyes, 6 times per day  fentaNYL 10 mcg/mL infusion, 50 mcg/hr, Intravenous, Continuous  chlorhexidine (PERIDEX) 0.12 % solution 15 mL, 15 mL, Mouth/Throat, BID  famotidine (PEPCID) injection 20 mg, 20 mg, Intravenous, BID  propofol injection, 10 mcg/kg/min, Intravenous, Titrated  midazolam (VERSED) 100 mg in dextrose 5 % 100 mL infusion, 1 mg/hr, Intravenous, Continuous  fentaNYL (SUBLIMAZE) injection 25 mcg, 25 mcg, Intravenous, Q2H PRN  midazolam (VERSED) injection 2 mg, 2 mg, Intravenous, Q2H PRN  glucose (GLUTOSE) 40 % oral gel 15 g, 15 g, Oral, PRN  dextrose 50 % IV solution, 12.5 g, Intravenous, PRN  glucagon (rDNA) injection 1 mg, 1 mg, Intramuscular, PRN  dextrose 5 % solution, 100 mL/hr, Intravenous, PRN  cefepime (MAXIPIME) 2 g IVPB minibag, 2 g, Intravenous, Q12H  norepinephrine (LEVOPHED) 16 mg in dextrose 5% 250 mL infusion, 2 mcg/min, Intravenous, Continuous  propofol injection 120 mg, 12 mL, Intravenous, Continuous PRN  dexamethasone (PF) (DECADRON) injection 10 mg, 10 mg, Intravenous, Q24H  lidocaine 1 % injection 5 mL, 5 mL, Intradermal, Once  0.9 % sodium chloride bolus, 30 mL, Intravenous, PRN  sodium chloride flush 0.9 % injection 10 mL, 10 mL, Intravenous, 2 times per day  sodium chloride flush 0.9 % injection 10 mL, 10 mL, Intravenous, PRN  potassium chloride 10 mEq/100 mL IVPB (Peripheral Line), 10 mEq, Intravenous, PRN  magnesium sulfate 2 g in 50 mL IVPB premix, 2 g, Intravenous, PRN  acetaminophen (TYLENOL) tablet 650 mg, 650 mg, Oral, Q6H PRN **OR** acetaminophen (TYLENOL) suppository 650 mg, 650 mg, Rectal, Q6H PRN  magnesium hydroxide (MILK OF MAGNESIA) 400 MG/5ML suspension 30 mL, 30 mL, Oral, Daily PRN  promethazine (PHENERGAN) tablet 12.5 mg, 12.5 mg, Oral, Q6H PRN **OR** ondansetron (ZOFRAN) injection 4 mg, 4 mg, Intravenous, Q6H PRN  enoxaparin (LOVENOX) injection 40 mg, 40 mg, Subcutaneous, BID    Data reviewed:  Labs:  CBC:   Recent Labs     12/07/20  0522 12/08/20  0442 12/09/20  0555   WBC 19.0* 16.6* 18.1*   HGB 12.0 11.8* 11.2*   HCT 36.0 36.6 35.1*   MCV 92.7 95.4 96.3    331 296     BMP:   Recent Labs     12/07/20  0522 12/08/20  0442 12/08/20  1112    136 134*   K 4.8 5.4* 5.4*    101 102   CO2 27 25 24   PHOS 3.5  --   --    BUN 20 43* 45* CREATININE 0.6 1.7* 1.7*     LIVER PROFILE:   Recent Labs     20  0522 20  0442   AST 17 16   ALT 32 31   BILIDIR <0.2 <0.2   BILITOT 0.3 0.3   ALKPHOS 76 70     PT/INR: No results for input(s): PROTIME, INR in the last 72 hours. APTT:   No results for input(s): APTT in the last 72 hours. AB/7- .29/61/126 (on 100%)  - 7.25/64/105 (on 80%)  - .19/67/97 (on 100%)    Cultures:  Blood culture (): NGTD  Nasal MRSA probe: Negative  Sputum culture: 1+WBCs; 2+GPC; 2+GPR     Films:  Chest images and reports were reviewed by me. My interpretation is:  CXR (20): Diffuse bilateral infiltrates; ETT and PICC in place    Assessment:     Acute hypoxic respiratory failure  COVID-19 pneumonia  ARDS  Shock  Elevated LFTs  Leukocytosis  Hyperglycemia  Acute kidney injury  Fever      Plan:    Acute hypoxic respiratory failure  -Due to COVID-19 pneumonia  -Continue mechanical ventilation. Wean FiO2 and PEEP as able to keep oxygen saturation greater than 90%  -Continue fentanyl, propofol and versed    COVID-19 pneumonia  -Completed 10 days of Decadron   -Completed 5 days of remdesivir  -Status post convalescent plasma on     ARDS  -Continue mechanical ventilation and deep sedation     Shock  -Likely due to sedation   -Levophed to keep MAP greater than 60    Elevated LFTs  -Resolved     Leukocytosis  -May be due to steroids, but given increasing WBCs will continue cefepime for now (day #4)  -Follow culture data     Hyperglycemia  -Sliding scale insulin    Acute kidney injury  -Strict I/Os  -Nephrology following     Fever  -Send cultures      Prophylaxis  DVT- SQ heparin  GI- pepcid  VAP- peridex    I spent 35 minutes of critical care time with this patient excluding procedures.     Migue Chatman MD  [facility] Pulmonary, Critical Care and Sleep

## 2020-12-09 NOTE — PROGRESS NOTES
hydroxide, promethazine **OR** ondansetron      Intake/Output Summary (Last 24 hours) at 12/9/2020 0745  Last data filed at 12/9/2020 0306  Gross per 24 hour   Intake 2214.53 ml   Output 1070 ml   Net 1144.53 ml       Physical Exam Performed:    BP (!) 123/52   Pulse 57   Temp 100.1 °F (37.8 °C) (Axillary)   Resp 16   Ht 5' 6\" (1.676 m)   Wt 252 lb 6.8 oz (114.5 kg)   LMP  (LMP Unknown)   SpO2 96%   BMI 40.74 kg/m²     General appearance: Intubated and sedated  HEENT: Pupils equal, round, and reactive to light. Conjunctivae/corneas clear. Neck: Supple, with full range of motion. No jugular venous distention. Trachea midline. Respiratory: Diminished breath sounds bilateral  Cardiovascular: Normal S1-S2  Abdomen: Soft, non-tender, non-distended with normal bowel sounds. Musculoskeletal: No clubbing, cyanosis or edema bilaterally. Skin: Skin color, texture, turgor normal.  No rashes or lesions. Neurologic:  Intubated and sedated  Psychiatric: Intubated and sedated  Capillary Refill: Brisk,< 3 seconds   Peripheral Pulses: +2 palpable, equal bilaterally       Labs:   Recent Labs     12/07/20 0522 12/08/20 0442 12/09/20  0555   WBC 19.0* 16.6* 18.1*   HGB 12.0 11.8* 11.2*   HCT 36.0 36.6 35.1*    331 296     Recent Labs     12/07/20 0522 12/08/20 0442 12/08/20  1112    136 134*   K 4.8 5.4* 5.4*    101 102   CO2 27 25 24   BUN 20 43* 45*   CREATININE 0.6 1.7* 1.7*   CALCIUM 8.2* 8.6 8.1*   PHOS 3.5  --   --      Recent Labs     12/07/20 0522 12/08/20 0442   AST 17 16   ALT 32 31   BILIDIR <0.2 <0.2   BILITOT 0.3 0.3   ALKPHOS 76 70     No results for input(s): INR in the last 72 hours.   Recent Labs     12/08/20 0442   CKTOTAL 230*       Urinalysis:      Lab Results   Component Value Date    NITRU Negative 12/08/2020    WBCUA 67 12/08/2020    RBCUA 4 12/08/2020    BLOODU Negative 12/08/2020    SPECGRAV 1.020 12/08/2020    GLUCOSEU Negative 12/08/2020       Radiology:  XR CHEST PORTABLE   Final Result   Stable bilateral airspace opacities. XR CHEST PORTABLE   Final Result   The gastric tube extends to the distal stomach as described. Multifocal airspace disease unchanged. XR CHEST PORTABLE   Final Result   1. Severe pulmonary involvement of COVID-19 and/or sequela related to ARDS or   pulmonary edema. Other infectious etiologies are consideration as well. 2. No gross pneumothorax evident; suboptimal exam for evaluation of   pneumothorax performed prone. 3. Pneumomediastinum, similar in appearance to prior study. 4.  Life support appliances appear appropriately positioned. XR CHEST PORTABLE   Final Result   Extensive bilateral pulmonary infiltrates with progression. Satisfactory position of endotracheal tube. Questionable pleural line overlying the left hilum concerning for   pneumomediastinum. There is also probable subcutaneous emphysema in the   cervical region bilaterally. No significant pneumothorax. Attention on   follow-up imaging. Findings were discussed with Kiana Stoll at 5:40 p.m. on   12/6/2020. XR CHEST PORTABLE   Final Result   Bilateral patchy consolidative opacities suggestive of multifocal infection,   including viral pneumonia.          XR CHEST PORTABLE    (Results Pending)           Assessment/Plan:    COVID-19 pneumonia   chest x-ray with bilateral patchy opacities  Continue Decadron 20 mg daily, titrated down to 10 mg daily  Convalescent plasma given  Remdesivir for 5 days  Lovenox twice daily    Acute respiratory failure with hypoxia  Continue to titrate to keep oxygen saturations above 90%  FiO2 increased overnight to 100%, now back down to 80%  Intubated on December 6  Levaquin for 5 days, finished  Requiring low-dose pressors but is likely secondary to large amounts of sedation required for intubation and paralytics    Acute Renal Failure  Likely pre renal with poor po intake, low BP maybe

## 2020-12-10 NOTE — PROGRESS NOTES
Hospitalist Progress Note      PCP: Elliot Torre, APRN - CNP    Date of Admission: 11/28/2020    Chief Complaint: Respiratory failure    Hospital Course: 55 yo female admitted with lethargy, sob, tested + for covid 2 weeks ago. She required 4L O2 in the ED. However, since admission overnight she has needed more O2 and is now on vapotherm at 60L O2.   Since admission the patient's oxygen demands have been somewhat labile but for the most part she is requiring 60 L along with a nonrebreather. Patient was on BiPAP on and off along with the OptiFlow. Eventually on December 6 the patient's oxygen saturations were unable to be maintained above 86% and an ABG showed a PaO2 of 48. The decision was made to transfer to the ICU and intubate and paralyzed the patient.      Subjective: Sedated intubated      Medications:  Reviewed    Infusion Medications    vasopressin (Septic Shock) infusion      sodium chloride 50 mL/hr at 12/09/20 0950    fentaNYL 200 mcg/hr (12/10/20 0829)    propofol 20 mcg/kg/min (12/10/20 0609)    midazolam 8 mg/hr (12/09/20 2006)    dextrose      norepinephrine Stopped (12/09/20 1045)    propofol       Scheduled Medications    heparin (porcine)  7,500 Units Subcutaneous 3 times per day    insulin lispro  0-12 Units Subcutaneous Q4H    chlorhexidine  15 mL Mouth/Throat BID    famotidine (PEPCID) injection  20 mg Intravenous BID    cefepime  2 g Intravenous Q12H    lidocaine 1 % injection  5 mL Intradermal Once    sodium chloride flush  10 mL Intravenous 2 times per day     PRN Meds: lubrifresh P.M., fentanNYL, midazolam, glucose, dextrose, glucagon (rDNA), dextrose, propofol, sodium chloride, sodium chloride flush, potassium chloride, magnesium sulfate, acetaminophen **OR** acetaminophen, magnesium hydroxide, promethazine **OR** ondansetron      Intake/Output Summary (Last 24 hours) at 12/10/2020 0837  Last data filed at 12/10/2020 0452  Gross per 24 hour   Intake 3266.53 ml Output 1035 ml   Net 2231.53 ml       Exam:    BP (!) 116/55   Pulse 51   Temp 97.6 °F (36.4 °C) (Oral)   Resp 22   Ht 5' 6\" (1.676 m)   Wt 250 lb 3.6 oz (113.5 kg)   LMP  (LMP Unknown)   SpO2 90%   BMI 40.39 kg/m²     General appearance: Sedated intubated, appears stated age and cooperative. HEENT: Pupils equal, round, and reactive to light. Conjunctivae/corneas clear. Neck: Supple, with full range of motion. No jugular venous distention. Trachea midline. Respiratory:  Sedated, intubated, Clear to auscultation, bilaterally without Rales/Wheezes/Rhonchi. Cardiovascular: Regular rate and rhythm with normal S1/S2 without murmurs, rubs or gallops. Abdomen: Soft, non-tender, non-distended with normal bowel sounds. Musculoskeletal: No clubbing, cyanosis or edema bilaterally. Full range of motion without deformity. Skin: Skin color, texture, turgor normal.  No rashes or lesions. Neurologic:  Neurovascularly intact without any focal sensory/motor deficits. Cranial nerves: II-XII intact, grossly non-focal.  Psychiatric: Alert and oriented, thought content appropriate, normal insight  Capillary Refill: Brisk,< 3 seconds   Peripheral Pulses: +2 palpable, equal bilaterally       Labs:   Recent Labs     12/08/20 0442 12/09/20  0555 12/10/20  0400   WBC 16.6* 18.1* 14.7*   HGB 11.8* 11.2* 11.4*   HCT 36.6 35.1* 34.5*    296 244     Recent Labs     12/09/20  0555 12/09/20  1515 12/10/20  0400    139 139   K 5.7* 5.5* 5.2*    109 108   CO2 24 23 21   BUN 67* 78* 89*   CREATININE 2.0* 1.9* 2.0*   CALCIUM 8.0* 8.0* 8.4     Recent Labs     12/08/20  0442 12/09/20  0555 12/10/20  0400   AST 16 28 25   ALT 31 41* 64*   BILIDIR <0.2 <0.2 <0.2   BILITOT 0.3 <0.2 0.3   ALKPHOS 70 61 64     No results for input(s): INR in the last 72 hours.   Recent Labs     12/08/20  0442 12/10/20  0400   CKTOTAL 230*  --    TROPONINI  --  <0.01       Urinalysis:      Lab Results   Component Value Date    NITRU Negative 12/09/2020    WBCUA 168 12/09/2020    BACTERIA 4+ 12/09/2020    RBCUA 4 12/08/2020    BLOODU Negative 12/09/2020    SPECGRAV 1.020 12/09/2020    GLUCOSEU Negative 12/09/2020       Radiology:  XR CHEST PORTABLE   Final Result   Stable bilateral airspace opacities. XR CHEST PORTABLE   Final Result   The gastric tube extends to the distal stomach as described. Multifocal airspace disease unchanged. XR CHEST PORTABLE   Final Result   1. Severe pulmonary involvement of COVID-19 and/or sequela related to ARDS or   pulmonary edema. Other infectious etiologies are consideration as well. 2. No gross pneumothorax evident; suboptimal exam for evaluation of   pneumothorax performed prone. 3. Pneumomediastinum, similar in appearance to prior study. 4.  Life support appliances appear appropriately positioned. XR CHEST PORTABLE   Final Result   Extensive bilateral pulmonary infiltrates with progression. Satisfactory position of endotracheal tube. Questionable pleural line overlying the left hilum concerning for   pneumomediastinum. There is also probable subcutaneous emphysema in the   cervical region bilaterally. No significant pneumothorax. Attention on   follow-up imaging. Findings were discussed with Maksim Cabrera at 5:40 p.m. on   12/6/2020. XR CHEST PORTABLE   Final Result   Bilateral patchy consolidative opacities suggestive of multifocal infection,   including viral pneumonia.          XR CHEST PORTABLE    (Results Pending)           Assessment/Plan:    Active Hospital Problems    Diagnosis Date Noted    Fever [R50.9]     CLEMENT (acute kidney injury) ([address] [N17.9]     Shock ([address] [R57.9]     Hyperglycemia [R73.9]     ARDS (adult respiratory distress syndrome) ([facility]) [J80]     Acute respiratory failure with hypoxia ([facility]) [J96.01]     Pneumonia due to COVID-19 virus [U07.1, J12.89]     Elevated LFTs [R79.89]     Leukocytosis [D72.829]     COVID-19 [U07.1] 11/28/2020       COVID-19 pneumonia   chest x-ray with bilateral patchy opacities  Continue Decadron 20 mg daily, titrated down to 10 mg daily  Convalescent plasma given  Remdesivir for 5 days  Lovenox twice daily     Acute respiratory failure with hypoxia  Continue to titrate to keep oxygen saturations above 90%  FiO2 increased overnight to 100%, now back down to 80%  Intubated on December 6  Levaquin for 5 days, finished  Requiring low-dose pressors but is likely secondary to large amounts of sedation required for intubation and paralytics     Acute Renal Failure  Likely pre renal with poor po intake, low BP maybe ATN  Nephro following  Gently IVF     Hypertension  Continue home medications     Hyperlipidemia  Continue home statin     Morbid Obesity  BMI 41  Counseled on weight loss    DVT Prophylaxis: Lovenox  Diet: DIET TUBE FEED CONTINUOUS/CYCLIC NPO; Renal Formula (Nepro); 25; 25  Code Status: Full Code    PT/OT Eval Status: Pending    Dispo - ICU, guarded prognosis    León Rubio MD

## 2020-12-10 NOTE — FLOWSHEET NOTE
12/10/20 1455   Encounter Summary   Services provided to: Family   Referral/Consult From: Nurse   Support System Children;Family members   Continue Visiting   (support to family 12/10 CL)   Complexity of Encounter Moderate   Length of Encounter 30 minutes   Grief and Life Adjustment   Type End of life   Assessment Calm; Approachable;Tearful; Anticipatory grief   Intervention Active listening;Explored feelings, thoughts, concerns; End of life care; Discussed illness/injury and it's impact   Outcome Engaged in conversation;Coping;Tearful   Electronically signed by Shirin Vegas on 12/10/2020 at 2:56 PM

## 2020-12-10 NOTE — PROGRESS NOTES
(PEPCID) injection 20 mg, 20 mg, Intravenous, BID  propofol injection, 10 mcg/kg/min, Intravenous, Titrated  midazolam (VERSED) 100 mg in dextrose 5 % 100 mL infusion, 1 mg/hr, Intravenous, Continuous  fentaNYL (SUBLIMAZE) injection 25 mcg, 25 mcg, Intravenous, Q2H PRN  midazolam (VERSED) injection 2 mg, 2 mg, Intravenous, Q2H PRN  glucose (GLUTOSE) 40 % oral gel 15 g, 15 g, Oral, PRN  dextrose 50 % IV solution, 12.5 g, Intravenous, PRN  glucagon (rDNA) injection 1 mg, 1 mg, Intramuscular, PRN  dextrose 5 % solution, 100 mL/hr, Intravenous, PRN  cefepime (MAXIPIME) 2 g IVPB minibag, 2 g, Intravenous, Q12H  norepinephrine (LEVOPHED) 16 mg in dextrose 5% 250 mL infusion, 2 mcg/min, Intravenous, Continuous  propofol injection 120 mg, 12 mL, Intravenous, Continuous PRN  lidocaine 1 % injection 5 mL, 5 mL, Intradermal, Once  0.9 % sodium chloride bolus, 30 mL, Intravenous, PRN  sodium chloride flush 0.9 % injection 10 mL, 10 mL, Intravenous, 2 times per day  sodium chloride flush 0.9 % injection 10 mL, 10 mL, Intravenous, PRN  potassium chloride 10 mEq/100 mL IVPB (Peripheral Line), 10 mEq, Intravenous, PRN  magnesium sulfate 2 g in 50 mL IVPB premix, 2 g, Intravenous, PRN  acetaminophen (TYLENOL) tablet 650 mg, 650 mg, Oral, Q6H PRN **OR** acetaminophen (TYLENOL) suppository 650 mg, 650 mg, Rectal, Q6H PRN  magnesium hydroxide (MILK OF MAGNESIA) 400 MG/5ML suspension 30 mL, 30 mL, Oral, Daily PRN  promethazine (PHENERGAN) tablet 12.5 mg, 12.5 mg, Oral, Q6H PRN **OR** ondansetron (ZOFRAN) injection 4 mg, 4 mg, Intravenous, Q6H PRN    Data reviewed:  Labs:  CBC:   Recent Labs     12/08/20  0442 12/09/20  0555 12/10/20  0400   WBC 16.6* 18.1* 14.7*   HGB 11.8* 11.2* 11.4*   HCT 36.6 35.1* 34.5*   MCV 95.4 96.3 93.7    296 244     BMP:   Recent Labs     12/09/20  0555 12/09/20  1515 12/10/20  0400    139 139   K 5.7* 5.5* 5.2*    109 108   CO2 24 23 21   BUN 67* 78* 89*   CREATININE 2.0* 1.9* 2.0* LIVER PROFILE:   Recent Labs     20  0442 20  0555 12/10/20  0400   AST 16 28 25   ALT 31 41* 64*   BILIDIR <0.2 <0.2 <0.2   BILITOT 0.3 <0.2 0.3   ALKPHOS 70 61 64     PT/INR: No results for input(s): PROTIME, INR in the last 72 hours. APTT:   No results for input(s): APTT in the last 72 hours. AB/7- 7.29/61/126 (on 100%)  - 7.25/64/105 (on 80%)  - 7.19//97 (on 100%)  12/10- 7.24// (on 100%)    Cultures:  Blood culture (): NGTD  Blood culture (): Pending  Urine culture (): Pending   Nasal MRSA probe: Negative  Sputum culture (): Normal melaine    Films:  Chest images and reports were reviewed by me. My interpretation is:  CXR (20): Diffuse bilateral infiltrates; ETT and PICC in place    Assessment:     Acute hypoxic respiratory failure  COVID-19 pneumonia  ARDS  Shock  Elevated LFTs  Leukocytosis  Hyperglycemia  Acute kidney injury  Urinary tract infection      Plan:    Acute hypoxic respiratory failure  -Due to COVID-19 pneumonia  -Continue mechanical ventilation. Wean FiO2 and PEEP as able to keep oxygen saturation greater than 90%  -On fentanyl, propofol and versed. Lighten sedation     COVID-19 pneumonia  -Completed 10 days of Decadron and 5 days of remdesivir   -Status post convalescent plasma on     ARDS  -Continue mechanical ventilation. Lighten sedation     Shock  -Likely due to sedation   -Now off levophed     Elevated LFTs  -Resolved     Leukocytosis  -May be due to steroids, but due to leukocytosis will give cefepime (day #5/5)    Hyperglycemia  -Sliding scale insulin    Acute kidney injury  -Strict I/Os  -Nephrology following     Urinary tract infection  -On cefepime (day #5/5)  -Follow culture data       Prophylaxis  DVT- SQ heparin  GI- pepcid  VAP- peridex    I spent 35 minutes of critical care time with this patient excluding procedures.     Goyo Hamilton MD  [facility] Pulmonary, Critical Care and Sleep

## 2020-12-10 NOTE — DISCHARGE SUMMARY
Hospital Medicine Discharge Summary    Patient ID: Amy Blount      Patient's PCP: Petar Srivastava, APRN - CNP    Admitting Physician: Sindi Martel MD  Discharge Physician: Darryn Lizama MD     Admit Date: 2020     Disposition:     Discharge Diagnoses: Active Hospital Problems    Diagnosis    Urinary tract infection without hematuria [N39.0]    Fever [R50.9]    CLEMENT (acute kidney injury) ([address] [N17.9]    Shock ([address] [R57.9]    Hyperglycemia [R73.9]    ARDS (adult respiratory distress syndrome) ([facility]) [J80]    Acute respiratory failure with hypoxia ([facility]) [J96.01]    Pneumonia due to COVID-19 virus [U07.1, J12.89]    Elevated LFTs [R79.89]    Leukocytosis [D72.829]    COVID-19 [U07.1]       Date of Death:  12/10/2020  Time of Death: 1547    Immediate Cause of Death:  Covid pneumonia with shock and acute respiratory failure  Underlying Conditions:  HTN, HLD, morbid obesity  Other Contributing Conditions: CLEMENT    Hospital Course:     57 yo female admitted with lethargy, sob, tested + for covid 2 weeks ago. She required 4L O2 in the ED. However, since admission overnight she has needed more O2 and is now on vapotherm at 60L O2.   Since admission the patient's oxygen demands were somewhat labile but for the most part she required 60 L along with a nonrebreather.  Patient was on BiPAP on and off along with the OptiFlow.  Eventually on  the patient's oxygen saturations were unable to be maintained above 86% and an ABG showed a PaO2 of 48.  The decision was made to transfer to the ICU and intubate and paralyzed the patient. Her respiratory status continued to worsen on the ventilator, and she required prone positioning but did not improve. She subsequently became hypotensive requiring two vasopressors. Family were updated and they decided she would not have wanted to be resuscitated during which time she was made DNR.   After further discussion, they decided to withdraw care and she passed shortly after. Consults:  IP CONSULT TO PHARMACY  IP CONSULT TO PULMONOLOGY  IP CONSULT TO DIETITIAN  IP CONSULT TO NEPHROLOGY  IP CONSULT TO DIETITIAN    Signed: Ivory Ramirez MD   12/14/2020    Thank you VENKAT Bennett CNP for the opportunity to be involved in this patient's care. If you have any questions or concerns please feel free to contact me at [phone].

## 2020-12-10 NOTE — PROGRESS NOTES
Narcotic Waste Documentation    Administered 40 mg of versed and wasted 60 mg per [facility].   Electronically signed by Gerardo Eller RN on 12/10/2020 at 6:20 PM   Electronically signed by Shannon Dixon RN on 12/10/2020 at 6:21 PM

## 2020-12-10 NOTE — FLOWSHEET NOTE
Pt's family left the ICU as this  left the ICU and appeared to be supporting one another. 12/10/20 1605   Encounter Summary   Services provided to: Family   Referral/Consult From: [address] [facility] [address] Children;Family members   Continue Visiting   (support and prayer 12/10 CL)   Complexity of Encounter High   Length of Encounter 45 minutes   Grief and Life Adjustment   Type End of life;Death   Assessment Calm; Approachable;Tearful; Anticipatory grief   Intervention Active listening;Explored feelings, thoughts, concerns;Prayer; End of life care; Discussed belief system/Gnosticist practices/dick;Discussed illness/injury and it's impact   Outcome Engaged in conversation;Coping;Tearful;Grieving   Electronically signed by Melissa Gutierrez on 12/10/2020 at 4:07 PM'

## 2020-12-10 NOTE — PROGRESS NOTES
Comprehensive Nutrition Assessment    Type and Reason for Visit:  Reassess    Nutrition Recommendations/Plan:   When medically appropriate, continue Nepro at 25 ml/hr (calculated over 20 hr to account for rountine nsg care) + Proteinex BID. Nutrition Assessment:  Follow-up. Pt continues on vent. Pt requiring Levo overnight but now off. Propofol infusing at 13.7 ml/hr providing 362 kcal from lipids at current rate. Was on Nepro at 25 ml/hr + Proteinex BID. TF currently stopped, had residuals this morning (300 ml per EMR). Can continue current TF regimen when appropriate to restart. Malnutrition Assessment:  Malnutrition Status:  Insufficient data      Due to current CDC guidelines recommending 6-ft distancing for social isolation for COVID19 prevention, NFPE/malnutrition assessment was deferred at this time. Estimated Daily Nutrient Needs:  Energy (kcal):  5965-6540 kcal (11-14 kcal/kg ABW); Weight Used for Energy Requirements:  Current     Protein (g):   gm (1.5-2 gm/kg IBW); Weight Used for Protein Requirements:  Ideal        Fluid (ml/day):  1 ml/kcal; Method Used for Fluid Requirements:  1 ml/kcal      Nutrition Related Findings:  Reviewed labs, K+ still elevated but trending down      Wounds:  (incisions to abdomen and vagina but unable to assess per nurse d/t proning)       Current Nutrition Therapies:    DIET TUBE FEED CONTINUOUS/CYCLIC NPO; Renal Formula (Nepro); 25; 25  Current Tube Feeding (TF) Orders:  · Feeding Route: Orogastric  · Formula: Renal  · Schedule: Continuous  · Additives/Modulars: Protein(BID)  · Water Flushes: currently stopped  · Current TF & Flush Orders Provides: currently held  · Goal TF & Flush Orders Provides: When medically appropriate, continue Nepro at 25 ml/hr (calculated over 20 hr to account for rountine nsg care) + Proteinex BID. This provides 500 ml TV, 900 kcal, 41 gm of protein, and 364 ml free fluid.   Proteinex BID provides an additional 208 kcal, 52 gm of protein. Anthropometric Measures:  · Height: 5' 6\" (167.6 cm)  · Current Body Weight: 250 lb (113.4 kg)   · Admission Body Weight: 257 lb (116.6 kg)    · Ideal Body Weight: 130 lbs; % Ideal Body Weight 192.3 %   · BMI: 40.4  · Adjusted Body Weight:  ; No Adjustment   · BMI Categories: Obese Class 3 (BMI 40.0 or greater)       Nutrition Diagnosis:   · Inadequate oral intake related to impaired respiratory function as evidenced by intubation      Nutrition Interventions:   Food and/or Nutrient Delivery:  (start nutrition when able per MD)  Nutrition Education/Counseling:  No recommendation at this time   Coordination of Nutrition Care:  Continue to monitor while inpatient    Goals:  tolerate most appropriate form of nutrition per MD       Nutrition Monitoring and Evaluation:   Behavioral-Environmental Outcomes:  None Identified   Food/Nutrient Intake Outcomes:  (Nutrition advancement)  Physical Signs/Symptoms Outcomes:  Biochemical Data, Hemodynamic Status, Nutrition Focused Physical Findings, Skin, Weight     Discharge Planning:     Too soon to determine     Electronically signed by Junie Coe RD, LD on 12/10/20 at 12:21 PM EST    Contact: [phone]

## 2020-12-10 NOTE — CARE COORDINATION
The Marlin Bansal is a 56 y.o. female who I was called to pronounce. Patient's rhythm strip was evaluated and patient was not examined since patient is COVID-19 positive.   Patient developed flatline on the monitor around [phone] and patient was declared dead on this 10th day of December b2020 at 5:25 PM.

## 2020-12-10 NOTE — PROGRESS NOTES
RN came to room after oxygen saturation reading decreased to 47%. Upon entering room arterial BP read 62/30. Vasopressin and levophed were started with a sharp increase in BP shortly after. Pressors were then stopped. The patient was manually bagged throughout this time with an increase in oxygen saturations to 90%. Dr. Pardeep Chapman made aware and came to bedside at this time. The patients heart rate dropped into the upper 30's at this time as well. The propofol drip was stopped  but her HR increased to the 50's again and propfol was resumed at 30mcg/kg/min. The patient was proned and paralyzed with 50mg of rocuronium. Versed gtt was increased to 10mg and fentanyl remained at 200mcg/hr. Family, Maame Maldonado, was contacted and spoke with Dr. Pardeep Chapman. The patients 2 sons are going to come to the hospital at this time. Electronically signed by Velia Lira RN on 12/10/2020 at 1:07 PM    1309: Hanny Burch RN contacted family again for an ETA and to explain visitor rules. 1310:  contacted. RT bagging patient again for desaturation to 65%. 1338: family arrived. Levophed resumed at 10mcg/min. 1345: Dr. Pardeep Chapman is spreaking with Sarah Beth Licea, and Maame Joel, the patients sons and daughter-in-law. Oxygen saturations are maintaining at 79% on ventilator. 1348: Levophed decreased to 4mcg/min  1505: the patients family spoke with Dr. aPrdeep Chapman again and they have decided to withdraw care  [phone]: comfort medications given and patient extubated. Family outside of doorway. 1547: Asystole on monitor and no BP on arterial line. 1600:  All belongings, including 2 rings, cell phone, purse, and clothes were given to Maame Joel per family request.    Electronically signed by Velia Lira RN on 12/10/2020 at 5:11 PM

## 2020-12-11 ENCOUNTER — TELEPHONE (OUTPATIENT)
Dept: SOCIAL WORK | Age: 60
End: 2020-12-11

## 2020-12-11 LAB
BLOOD CULTURE, ROUTINE: NORMAL
CULTURE, BLOOD 2: NORMAL

## 2020-12-11 NOTE — TELEPHONE ENCOUNTER
Pt's son Luz Carranza called back and advised [address] in [address]- family meeting at  home at [address] tomorrow. security advised.

## 2020-12-13 LAB
BLOOD CULTURE, ROUTINE: NORMAL
CULTURE, BLOOD 2: NORMAL

## 2021-10-20 NOTE — PROGRESS NOTES
[facility]  Nephrology   Progress Note        We are following the patient for CLEMENT Hyperkalemia  55 y/o WF with h/o HTN Obesity admitted on  with c/o SOB and fatigue She had exposure to Covid 2 weeks prior to admission and has had symptoms for a week prior to admission  She had worsening oxygenation and increased O2 requirements and was intubated on 20  Her Cr had been normal until today when it was noted to have increased from 0.6 mg to 1.7 mg  SUBJECTIVE:  Intubated 85% Fio2  Required Levophed transiently overnight  Off of Levophed now  Adequate UOP last 24 hours  K= 5.2 meq today   Cr seems to be stabilizing    OBJECTIVE:     PHYSICAL:    TEMPERATURE:  Current - Temp: 97.9 °F (36.6 °C);  Max - Temp  Av.8 °F (36.6 °C)  Min: 97.6 °F (36.4 °C)  Max: 97.9 °F (36.6 °C)  RESPIRATIONS RANGE: Resp  Av.9  Min: 19  Max: 23  PULSE RANGE: Pulse  Av  Min: 43  Max: 54  BLOOD PRESSURE RANGE:  Systolic (21RNM), WQO:887 , Min:103 , MZF:679   ; Diastolic (73YSF), ONZ:82, Min:42, Max:66    PULSE OXIMETRY RANGE: SpO2  Av.5 %  Min: 80 %  Max: 96 %  24HR INTAKE/OUTPUT:      Intake/Output Summary (Last 24 hours) at 12/10/2020 1202  Last data filed at 12/10/2020 0800  Gross per 24 hour   Intake 2868.53 ml   Output 1095 ml   Net 1773.53 ml       CONSTITUTIONAL: intubated on vent  HEENT:  ETT in place  NECK:  supple, symmetrical, trachea midline  LUNGS:  Clear to auscultation anteriorly  CARDIOVASCULAR: bradycardic   ABDOMEN:  No scars, normal bowel sounds, soft, non-distended, non-tender, no masses palpated, no hepatosplenomegally  NEUROLOGIC: intubated sedated  SKIN: no bruising or bleeding  EXTREMITIES: trace to 1+ bilateral pedal edema    Medications     sodium chloride 50 mL/hr at 20 0950    fentaNYL 200 mcg/hr (12/10/20 1101)    propofol 20 mcg/kg/min (12/10/20 0609)    midazolam 7 mg/hr (12/10/20 1045)    dextrose      propofol          Data      CBC:   Recent Labs 12/08/20  0442 12/09/20  0555 12/10/20  0400   WBC 16.6* 18.1* 14.7*   RBC 3.84* 3.65* 3.69*   HGB 11.8* 11.2* 11.4*   HCT 36.6 35.1* 34.5*    296 244     BMP:    Recent Labs     12/09/20  0555 12/09/20  1515 12/10/20  0400    139 139   K 5.7* 5.5* 5.2*    109 108   CO2 24 23 21   BUN 67* 78* 89*   CREATININE 2.0* 1.9* 2.0*   CALCIUM 8.0* 8.0* 8.4   GLUCOSE 146* 204* 145*     Phosphorus:    No results for input(s): PHOS in the last 72 hours.   Magnesium:    Recent Labs     12/10/20  0400   MG 3.00*         ASSESSMENT     Patient Active Problem List   Diagnosis    Postmenopausal bleeding    Complex atypical endometrial hyperplasia    COVID-19    Acute respiratory failure with hypoxia (HCC)    Pneumonia due to COVID-19 virus    Elevated LFTs    Leukocytosis    ARDS (adult respiratory distress syndrome) (HCC)    Shock ([address]    Hyperglycemia    CLEMENT (acute kidney injury) ([address]    Fever    Urinary tract infection without hematuria       PLAN    1-CLEMENT Cr increased to 1.7 mg from 0.6 mg Seems to be stabilizing ~  2 mg CPK OK U/A shows granular casts likely has  ATN Non oliguric  Continue gentle IVF's and monitor Avoid Hypotension Elevated BUN partly due to steroids  2-Hyperkalemia mild in the setting of CLEMENT TF changed to Nepro K= 5.2 meq after  Kayexalate   3-Ac resp failure s/p intubation 12/6/20  4 Shock improving off of Levophed Required briefly overnight  5 Covid Pneumonia with ARDS  Decadron completed S/P  Remdesivir and CP  6 Fever  On Cefepime BC negative so far urine CX pending      John Nair MD, [address] Azithromycin Pregnancy And Lactation Text: This medication is considered safe during pregnancy and is also secreted in breast milk.

## (undated) DEVICE — TOTAL TRAY, 16FR 10ML SIL FOLEY, URN: Brand: MEDLINE

## (undated) DEVICE — COVER LT HNDL BLU PLAS

## (undated) DEVICE — SOLUTION IV IRRIG POUR BRL 0.9% SODIUM CHL 2F7124

## (undated) DEVICE — KIT OR ROOM TURNOVER W/STRAP

## (undated) DEVICE — FS-35 DEVICE - (35MM CERVICAL CUP): Brand: MCCARUS-VOLKER FORNISEE® SYSTEM

## (undated) DEVICE — GLOVE SURG SZ 65 THK91MIL LTX FREE SYN POLYISOPRENE

## (undated) DEVICE — SPONGE LAP W18XL18IN WHT COT 4 PLY FLD STRUNG RADPQ DISP ST

## (undated) DEVICE — COVER,TABLE,77X90,STERILE: Brand: MEDLINE

## (undated) DEVICE — TROCAR ENDOSCP L100MM DIA12MM BLDELSS OBT RADLUC STBL SL

## (undated) DEVICE — PAD SANITARY MTRN TAB BELT WRP NS 11IN

## (undated) DEVICE — MINOR SET UP PK

## (undated) DEVICE — SOLUTION ANTIFOG VIS SYS CLEARIFY LAPSCP

## (undated) DEVICE — ELECTRODE PT RET AD L9FT HI MOIST COND ADH HYDRGEL CORDED

## (undated) DEVICE — PAD,NON-ADHERENT,3X8,STERILE,LF,1/PK: Brand: MEDLINE

## (undated) DEVICE — COLUMN DRAPE

## (undated) DEVICE — SOLUTION SCRB 4OZ 10% POVIDONE IOD ANTIMIC BTL

## (undated) DEVICE — YANKAUER,BULB TIP,W/O VENT,RIGID,STERILE: Brand: MEDLINE

## (undated) DEVICE — UNDERPAD INCONT XL MESH PROTCT + DISP FOR MAT USE

## (undated) DEVICE — BLADELESS OBTURATOR: Brand: WECK VISTA

## (undated) DEVICE — SYRINGE IRRIG 60ML SFT PLIABLE BLB EZ TO GRP 1 HND USE W/

## (undated) DEVICE — TOWEL,OR,DSP,ST,BLUE,STD,4/PK,20PK/CS: Brand: MEDLINE

## (undated) DEVICE — Z DISCONTINUED NO SUB IDED CONNECTOR SURG HEMOSTATIC OPN FLD FOR BIOMATERIAL

## (undated) DEVICE — Device

## (undated) DEVICE — CANISTER, RIGID, 1200CC: Brand: MEDLINE INDUSTRIES, INC.

## (undated) DEVICE — SOLUTION IV IRRIG WATER 1000ML POUR BRL 2F7114

## (undated) DEVICE — CANNULA SEAL

## (undated) DEVICE — SUTURE VCRL SZ 0 L27IN ABSRB UD L26MM CT-2 1/2 CIR J270H

## (undated) DEVICE — PAD TBL OP RM TRENDELENBURG STATIC TORSO W/STRAPS

## (undated) DEVICE — SYRINGE MED 10ML TRNSLUC BRL PLUNG BLK MRK POLYPR CTRL

## (undated) DEVICE — SKIN AFFIX SURG ADHESIVE 72/CS 0.55ML: Brand: MEDLINE

## (undated) DEVICE — GLOVE SURG SZ 8 L12IN FNGR THK87MIL WHT LTX FREE

## (undated) DEVICE — ARM DRAPE

## (undated) DEVICE — CHLORAPREP 26ML ORANGE

## (undated) DEVICE — TRAP,MUCUS SPECIMEN, 80CC: Brand: MEDLINE

## (undated) DEVICE — AGENT HEMSTAT W4XL8IN OXIDIZED REGENERATED CELOS ABSRB

## (undated) DEVICE — INVIEW CLEAR LEGGINGS: Brand: CONVERTORS

## (undated) DEVICE — TIP COVER ACCESSORY

## (undated) DEVICE — Z DISCONTINUED BY MEDLINE USE 2711682 TRAY SKIN PREP DRY W/ PREM GLV

## (undated) DEVICE — PACK,LAPARASCOPY,PELVISCOPY,AURORA: Brand: MEDLINE

## (undated) DEVICE — INTENDED FOR TISSUE SEPARATION, AND OTHER PROCEDURES THAT REQUIRE A SHARP SURGICAL BLADE TO PUNCTURE OR CUT.: Brand: BARD-PARKER ® STAINLESS STEEL BLADES

## (undated) DEVICE — TRI-LUMEN FILTERED TUBE SET WITH ACTIVATED CHARCOAL FILTER: Brand: AIRSEAL

## (undated) DEVICE — SUTURE SZ 0 27IN 5/8 CIR UR-6  TAPER PT VIOLET ABSRB VICRYL J603H

## (undated) DEVICE — Z DISCONTINUED USE 2270995 CATHETER URETH 16FR L16IN RED RUB INTMIT RND HLLW TIP 2 OPP

## (undated) DEVICE — BAG SPEC REM 224ML W4XL6IN DIA10MM 1 HND GYN DISP ENDOPCH

## (undated) DEVICE — PMI DISPOSABLE PUNCTURE CLOSURE DEVICE / SUTURE GRASPER: Brand: PMI

## (undated) DEVICE — SOLUTION IV 1000ML 0.9% SOD CHL PH 5 INJ USP VIAFLX PLAS

## (undated) DEVICE — INSERT SCIS L45CM DIA5MM CRV DBL ACT METZ DISPOSABLE

## (undated) DEVICE — DRAPE,UNDERBUTTOCKS,PCH,STERILE: Brand: MEDLINE

## (undated) DEVICE — DRAPE LAP 104X35X124IN BLU CTRL + FAB REINF ABD FEN

## (undated) DEVICE — Y-TYPE TUR/BLADDER IRRIGATION SET, REGULATING CLAMP

## (undated) DEVICE — TUBING, SUCTION, 1/4" X 12', STRAIGHT: Brand: MEDLINE

## (undated) DEVICE — INSUFFLATION NEEDLE TO ESTABLISH PNEUMOPERITONEUM.: Brand: INSUFFLATION NEEDLE

## (undated) DEVICE — SYSTEM SMK EVAC LAP TBNG FILTER HSNG BENT STYL PNK SEE CLR

## (undated) DEVICE — GLOVE,SURG,SENSICARE SLT,LF,PF,6: Brand: MEDLINE

## (undated) DEVICE — TISSUE RETRIEVAL SYSTEM: Brand: INZII RETRIEVAL SYSTEM

## (undated) DEVICE — ELECTRO LUBE IS A SINGLE PATIENT USE DEVICE THAT IS INTENDED TO BE USED ON ELECTROSURGICAL ELECTRODES TO REDUCE STICKING.: Brand: KEY SURGICAL ELECTRO LUBE

## (undated) DEVICE — LAPAROSCOPY PK

## (undated) DEVICE — AIRSEAL 8 MM ACCESS PORT AND LOW PROFILE OBTURATOR WITH BLADELESS OPTICAL TIP, 120 MM LENGTH: Brand: AIRSEAL

## (undated) DEVICE — GOWN SIRUS NONREIN XL W/TWL: Brand: MEDLINE INDUSTRIES, INC.

## (undated) DEVICE — SOLUTION PREP POVIDONE IOD FOR SKIN MUCOUS MEM PRIOR TO

## (undated) DEVICE — NEEDLE SPNL 22GA L3.5IN BLK HUB S STL REG WALL FIT STYL W/

## (undated) DEVICE — ELECTRODE ES L65IN DIA3MM S STL BLDE MPLR OPN APPRCH EZ TO

## (undated) DEVICE — SUTURE MCRYL SZ 4-0 L18IN ABSRB UD L19MM PS-2 3/8 CIR PRIM Y496G